# Patient Record
Sex: MALE | Race: WHITE | NOT HISPANIC OR LATINO | Employment: OTHER | URBAN - METROPOLITAN AREA
[De-identification: names, ages, dates, MRNs, and addresses within clinical notes are randomized per-mention and may not be internally consistent; named-entity substitution may affect disease eponyms.]

---

## 2018-01-01 ENCOUNTER — HOSPITAL ENCOUNTER (INPATIENT)
Facility: HOSPITAL | Age: 78
LOS: 8 days | Discharge: HOSPICE | End: 2018-10-16
Attending: EMERGENCY MEDICINE | Admitting: HOSPITALIST

## 2018-01-01 ENCOUNTER — APPOINTMENT (OUTPATIENT)
Dept: GENERAL RADIOLOGY | Facility: HOSPITAL | Age: 78
End: 2018-01-01
Attending: INTERNAL MEDICINE

## 2018-01-01 ENCOUNTER — APPOINTMENT (OUTPATIENT)
Dept: CT IMAGING | Facility: HOSPITAL | Age: 78
End: 2018-01-01

## 2018-01-01 ENCOUNTER — APPOINTMENT (OUTPATIENT)
Dept: GENERAL RADIOLOGY | Facility: HOSPITAL | Age: 78
End: 2018-01-01

## 2018-01-01 ENCOUNTER — HOSPITAL ENCOUNTER (INPATIENT)
Facility: HOSPITAL | Age: 78
LOS: 3 days | End: 2018-10-19
Attending: HOSPITALIST | Admitting: INTERNAL MEDICINE

## 2018-01-01 ENCOUNTER — APPOINTMENT (OUTPATIENT)
Dept: CARDIOLOGY | Facility: HOSPITAL | Age: 78
End: 2018-01-01
Attending: INTERNAL MEDICINE

## 2018-01-01 VITALS
DIASTOLIC BLOOD PRESSURE: 50 MMHG | SYSTOLIC BLOOD PRESSURE: 78 MMHG | TEMPERATURE: 100 F | OXYGEN SATURATION: 82 % | RESPIRATION RATE: 12 BRPM | HEART RATE: 102 BPM

## 2018-01-01 VITALS
OXYGEN SATURATION: 85 % | SYSTOLIC BLOOD PRESSURE: 121 MMHG | HEART RATE: 103 BPM | TEMPERATURE: 97.3 F | BODY MASS INDEX: 15.71 KG/M2 | DIASTOLIC BLOOD PRESSURE: 70 MMHG | WEIGHT: 112.21 LBS | HEIGHT: 71 IN | RESPIRATION RATE: 14 BRPM

## 2018-01-01 DIAGNOSIS — J18.9 COMMUNITY ACQUIRED PNEUMONIA OF LEFT LOWER LOBE OF LUNG: ICD-10-CM

## 2018-01-01 DIAGNOSIS — J96.01 ACUTE RESPIRATORY FAILURE WITH HYPOXIA (HCC): ICD-10-CM

## 2018-01-01 DIAGNOSIS — J44.1 COPD EXACERBATION (HCC): Primary | ICD-10-CM

## 2018-01-01 LAB
ALBUMIN SERPL-MCNC: 2.8 G/DL (ref 3.5–5.2)
ALBUMIN SERPL-MCNC: 2.9 G/DL (ref 3.5–5.2)
ALBUMIN SERPL-MCNC: 3.2 G/DL (ref 3.5–5.2)
ALBUMIN SERPL-MCNC: 3.5 G/DL (ref 3.5–5.2)
ALBUMIN SERPL-MCNC: 3.8 G/DL (ref 3.5–5.2)
ALBUMIN/GLOB SERPL: 0.9 G/DL
ALBUMIN/GLOB SERPL: 1.1 G/DL
ALBUMIN/GLOB SERPL: 1.1 G/DL
ALBUMIN/GLOB SERPL: 1.2 G/DL
ALBUMIN/GLOB SERPL: 1.2 G/DL
ALP SERPL-CCNC: 60 U/L (ref 39–117)
ALP SERPL-CCNC: 66 U/L (ref 39–117)
ALP SERPL-CCNC: 70 U/L (ref 39–117)
ALP SERPL-CCNC: 71 U/L (ref 39–117)
ALP SERPL-CCNC: 77 U/L (ref 39–117)
ALT SERPL W P-5'-P-CCNC: 123 U/L (ref 1–41)
ALT SERPL W P-5'-P-CCNC: 21 U/L (ref 1–41)
ALT SERPL W P-5'-P-CCNC: 62 U/L (ref 1–41)
ALT SERPL W P-5'-P-CCNC: 85 U/L (ref 1–41)
ALT SERPL W P-5'-P-CCNC: 96 U/L (ref 1–41)
ANION GAP SERPL CALCULATED.3IONS-SCNC: 10 MMOL/L
ANION GAP SERPL CALCULATED.3IONS-SCNC: 12.6 MMOL/L
ANION GAP SERPL CALCULATED.3IONS-SCNC: 12.9 MMOL/L
ANION GAP SERPL CALCULATED.3IONS-SCNC: 13.7 MMOL/L
ANION GAP SERPL CALCULATED.3IONS-SCNC: 13.7 MMOL/L
ANION GAP SERPL CALCULATED.3IONS-SCNC: 14.1 MMOL/L
ANION GAP SERPL CALCULATED.3IONS-SCNC: 9.4 MMOL/L
APPEARANCE FLD: ABNORMAL
APTT PPP: 28.2 SECONDS (ref 22.7–35.4)
ARTERIAL PATENCY WRIST A: ABNORMAL
ARTERIAL PATENCY WRIST A: POSITIVE
AST SERPL-CCNC: 123 U/L (ref 1–40)
AST SERPL-CCNC: 39 U/L (ref 1–40)
AST SERPL-CCNC: 42 U/L (ref 1–40)
AST SERPL-CCNC: 52 U/L (ref 1–40)
AST SERPL-CCNC: 80 U/L (ref 1–40)
ATMOSPHERIC PRESS: 748.3 MMHG
ATMOSPHERIC PRESS: 749.2 MMHG
ATMOSPHERIC PRESS: 751.6 MMHG
ATMOSPHERIC PRESS: 753.8 MMHG
ATMOSPHERIC PRESS: 754.6 MMHG
ATMOSPHERIC PRESS: 755 MMHG
B PERT DNA SPEC QL NAA+PROBE: NOT DETECTED
BACTERIA SPEC AEROBE CULT: NORMAL
BACTERIA SPEC RESP CULT: NORMAL
BASE EXCESS BLDA CALC-SCNC: -6.2 MMOL/L (ref 0–2)
BASE EXCESS BLDA CALC-SCNC: 0.5 MMOL/L (ref 0–2)
BASE EXCESS BLDA CALC-SCNC: 0.9 MMOL/L (ref 0–2)
BASE EXCESS BLDA CALC-SCNC: 1.5 MMOL/L (ref 0–2)
BASE EXCESS BLDA CALC-SCNC: 1.5 MMOL/L (ref 0–2)
BASE EXCESS BLDA CALC-SCNC: 5.9 MMOL/L (ref 0–2)
BASOPHILS # BLD AUTO: 0 10*3/MM3 (ref 0–0.2)
BASOPHILS # BLD AUTO: 0.01 10*3/MM3 (ref 0–0.2)
BASOPHILS NFR BLD AUTO: 0 % (ref 0–1.5)
BASOPHILS NFR BLD AUTO: 0.1 % (ref 0–1.5)
BDY SITE: ABNORMAL
BDY SITE: NORMAL
BH CV ECHO MEAS - ACS: 2.2 CM
BH CV ECHO MEAS - AO MAX PG (FULL): 1.9 MMHG
BH CV ECHO MEAS - AO MAX PG: 3.2 MMHG
BH CV ECHO MEAS - AO MEAN PG (FULL): 0 MMHG
BH CV ECHO MEAS - AO MEAN PG: 1 MMHG
BH CV ECHO MEAS - AO ROOT AREA (BSA CORRECTED): 2
BH CV ECHO MEAS - AO ROOT AREA: 9.1 CM^2
BH CV ECHO MEAS - AO ROOT DIAM: 3.4 CM
BH CV ECHO MEAS - AO V2 MAX: 88.9 CM/SEC
BH CV ECHO MEAS - AO V2 MEAN: 53.7 CM/SEC
BH CV ECHO MEAS - AO V2 VTI: 14.5 CM
BH CV ECHO MEAS - AVA(I,A): 2.4 CM^2
BH CV ECHO MEAS - AVA(I,D): 2.4 CM^2
BH CV ECHO MEAS - AVA(V,A): 2.2 CM^2
BH CV ECHO MEAS - AVA(V,D): 2.2 CM^2
BH CV ECHO MEAS - BSA(HAYCOCK): 1.6 M^2
BH CV ECHO MEAS - BSA: 1.7 M^2
BH CV ECHO MEAS - BZI_BMI: 16 KILOGRAMS/M^2
BH CV ECHO MEAS - BZI_METRIC_HEIGHT: 180.3 CM
BH CV ECHO MEAS - BZI_METRIC_WEIGHT: 52.2 KG
BH CV ECHO MEAS - EDV(CUBED): 110.6 ML
BH CV ECHO MEAS - EDV(MOD-SP2): 128 ML
BH CV ECHO MEAS - EDV(MOD-SP4): 188 ML
BH CV ECHO MEAS - EDV(TEICH): 107.5 ML
BH CV ECHO MEAS - EF(CUBED): 46.4 %
BH CV ECHO MEAS - EF(MOD-BP): 31.2 %
BH CV ECHO MEAS - EF(MOD-SP2): 31.3 %
BH CV ECHO MEAS - EF(MOD-SP4): 29.3 %
BH CV ECHO MEAS - EF(TEICH): 38.7 %
BH CV ECHO MEAS - ESV(CUBED): 59.3 ML
BH CV ECHO MEAS - ESV(MOD-SP2): 88 ML
BH CV ECHO MEAS - ESV(MOD-SP4): 133 ML
BH CV ECHO MEAS - ESV(TEICH): 65.9 ML
BH CV ECHO MEAS - FS: 18.8 %
BH CV ECHO MEAS - IVS/LVPW: 1
BH CV ECHO MEAS - IVSD: 1.1 CM
BH CV ECHO MEAS - LAT PEAK E' VEL: 6.2 CM/SEC
BH CV ECHO MEAS - LV DIASTOLIC VOL/BSA (35-75): 112.8 ML/M^2
BH CV ECHO MEAS - LV MASS(C)D: 194 GRAMS
BH CV ECHO MEAS - LV MASS(C)DI: 116.4 GRAMS/M^2
BH CV ECHO MEAS - LV MAX PG: 1.3 MMHG
BH CV ECHO MEAS - LV MEAN PG: 1 MMHG
BH CV ECHO MEAS - LV SYSTOLIC VOL/BSA (12-30): 79.8 ML/M^2
BH CV ECHO MEAS - LV V1 MAX: 56.7 CM/SEC
BH CV ECHO MEAS - LV V1 MEAN: 39.4 CM/SEC
BH CV ECHO MEAS - LV V1 VTI: 10.1 CM
BH CV ECHO MEAS - LVIDD: 4.8 CM
BH CV ECHO MEAS - LVIDS: 3.9 CM
BH CV ECHO MEAS - LVLD AP2: 7 CM
BH CV ECHO MEAS - LVLD AP4: 9.4 CM
BH CV ECHO MEAS - LVLS AP2: 6.5 CM
BH CV ECHO MEAS - LVLS AP4: 8.2 CM
BH CV ECHO MEAS - LVOT AREA (M): 3.5 CM^2
BH CV ECHO MEAS - LVOT AREA: 3.5 CM^2
BH CV ECHO MEAS - LVOT DIAM: 2.1 CM
BH CV ECHO MEAS - LVPWD: 1.1 CM
BH CV ECHO MEAS - MED PEAK E' VEL: 5.08 CM/SEC
BH CV ECHO MEAS - MV A DUR: 0.18 SEC
BH CV ECHO MEAS - MV A MAX VEL: 56.6 CM/SEC
BH CV ECHO MEAS - MV DEC SLOPE: 229 CM/SEC^2
BH CV ECHO MEAS - MV DEC TIME: 0.25 SEC
BH CV ECHO MEAS - MV E MAX VEL: 33.4 CM/SEC
BH CV ECHO MEAS - MV E/A: 0.59
BH CV ECHO MEAS - MV MAX PG: 2.4 MMHG
BH CV ECHO MEAS - MV MEAN PG: 1 MMHG
BH CV ECHO MEAS - MV P1/2T MAX VEL: 55 CM/SEC
BH CV ECHO MEAS - MV P1/2T: 70.3 MSEC
BH CV ECHO MEAS - MV V2 MAX: 77.5 CM/SEC
BH CV ECHO MEAS - MV V2 MEAN: 32.5 CM/SEC
BH CV ECHO MEAS - MV V2 VTI: 18.4 CM
BH CV ECHO MEAS - MVA P1/2T LCG: 4 CM^2
BH CV ECHO MEAS - MVA(P1/2T): 3.1 CM^2
BH CV ECHO MEAS - MVA(VTI): 1.9 CM^2
BH CV ECHO MEAS - PA ACC TIME: 0.11 SEC
BH CV ECHO MEAS - PA MAX PG (FULL): 0.64 MMHG
BH CV ECHO MEAS - PA MAX PG: 1.7 MMHG
BH CV ECHO MEAS - PA PR(ACCEL): 28.2 MMHG
BH CV ECHO MEAS - PA V2 MAX: 66 CM/SEC
BH CV ECHO MEAS - PULM A REVS DUR: 0.16 SEC
BH CV ECHO MEAS - PULM A REVS VEL: 49.1 CM/SEC
BH CV ECHO MEAS - PULM DIAS VEL: 49.5 CM/SEC
BH CV ECHO MEAS - PULM S/D: 1.1
BH CV ECHO MEAS - PULM SYS VEL: 52.6 CM/SEC
BH CV ECHO MEAS - PVA(V,A): 4.2 CM^2
BH CV ECHO MEAS - PVA(V,D): 4.2 CM^2
BH CV ECHO MEAS - QP/QS: 1.4
BH CV ECHO MEAS - RAP SYSTOLE: 3 MMHG
BH CV ECHO MEAS - RV MAX PG: 1.1 MMHG
BH CV ECHO MEAS - RV MEAN PG: 0 MMHG
BH CV ECHO MEAS - RV V1 MAX: 52.5 CM/SEC
BH CV ECHO MEAS - RV V1 MEAN: 30.5 CM/SEC
BH CV ECHO MEAS - RV V1 VTI: 9.5 CM
BH CV ECHO MEAS - RVOT AREA: 5.3 CM^2
BH CV ECHO MEAS - RVOT DIAM: 2.6 CM
BH CV ECHO MEAS - RVSP: 27 MMHG
BH CV ECHO MEAS - SI(AO): 79 ML/M^2
BH CV ECHO MEAS - SI(CUBED): 30.8 ML/M^2
BH CV ECHO MEAS - SI(LVOT): 21 ML/M^2
BH CV ECHO MEAS - SI(MOD-SP2): 24 ML/M^2
BH CV ECHO MEAS - SI(MOD-SP4): 33 ML/M^2
BH CV ECHO MEAS - SI(TEICH): 25 ML/M^2
BH CV ECHO MEAS - SV(AO): 131.6 ML
BH CV ECHO MEAS - SV(CUBED): 51.3 ML
BH CV ECHO MEAS - SV(LVOT): 35 ML
BH CV ECHO MEAS - SV(MOD-SP2): 40 ML
BH CV ECHO MEAS - SV(MOD-SP4): 55 ML
BH CV ECHO MEAS - SV(RVOT): 50.5 ML
BH CV ECHO MEAS - SV(TEICH): 41.6 ML
BH CV ECHO MEAS - TAPSE (>1.6): 2.3 CM2
BH CV ECHO MEAS - TR MAX PG: 24
BH CV ECHO MEAS - TR MAX VEL: 245 CM/SEC
BH CV ECHO MEASUREMENTS AVERAGE E/E' RATIO: 5.92
BH CV VAS BP RIGHT ARM: NORMAL MMHG
BH CV XLRA - RV BASE: 3.09 CM
BH CV XLRA - TDI S': 10 CM/SEC
BILIRUB SERPL-MCNC: 0.2 MG/DL (ref 0.1–1.2)
BILIRUB SERPL-MCNC: <0.2 MG/DL (ref 0.1–1.2)
BUN BLD-MCNC: 15 MG/DL (ref 8–23)
BUN BLD-MCNC: 16 MG/DL (ref 8–23)
BUN BLD-MCNC: 24 MG/DL (ref 8–23)
BUN BLD-MCNC: 30 MG/DL (ref 8–23)
BUN BLD-MCNC: 33 MG/DL (ref 8–23)
BUN BLD-MCNC: 34 MG/DL (ref 8–23)
BUN BLD-MCNC: 42 MG/DL (ref 8–23)
BUN/CREAT SERPL: 19.5 (ref 7–25)
BUN/CREAT SERPL: 22.9 (ref 7–25)
BUN/CREAT SERPL: 33.3 (ref 7–25)
BUN/CREAT SERPL: 36.6 (ref 7–25)
BUN/CREAT SERPL: 45.2 (ref 7–25)
BUN/CREAT SERPL: 50 (ref 7–25)
BUN/CREAT SERPL: 55.7 (ref 7–25)
C PNEUM DNA NPH QL NAA+NON-PROBE: NOT DETECTED
CALCIUM SPEC-SCNC: 7.5 MG/DL (ref 8.6–10.5)
CALCIUM SPEC-SCNC: 8.3 MG/DL (ref 8.6–10.5)
CALCIUM SPEC-SCNC: 8.4 MG/DL (ref 8.6–10.5)
CALCIUM SPEC-SCNC: 8.5 MG/DL (ref 8.6–10.5)
CALCIUM SPEC-SCNC: 8.6 MG/DL (ref 8.6–10.5)
CALCIUM SPEC-SCNC: 8.6 MG/DL (ref 8.6–10.5)
CALCIUM SPEC-SCNC: 9.2 MG/DL (ref 8.6–10.5)
CHLORIDE SERPL-SCNC: 101 MMOL/L (ref 98–107)
CHLORIDE SERPL-SCNC: 105 MMOL/L (ref 98–107)
CHLORIDE SERPL-SCNC: 106 MMOL/L (ref 98–107)
CHLORIDE SERPL-SCNC: 95 MMOL/L (ref 98–107)
CHLORIDE SERPL-SCNC: 96 MMOL/L (ref 98–107)
CO2 SERPL-SCNC: 24 MMOL/L (ref 22–29)
CO2 SERPL-SCNC: 24.3 MMOL/L (ref 22–29)
CO2 SERPL-SCNC: 24.6 MMOL/L (ref 22–29)
CO2 SERPL-SCNC: 26.3 MMOL/L (ref 22–29)
CO2 SERPL-SCNC: 26.9 MMOL/L (ref 22–29)
CO2 SERPL-SCNC: 29.1 MMOL/L (ref 22–29)
CO2 SERPL-SCNC: 29.4 MMOL/L (ref 22–29)
COLOR FLD: ABNORMAL
CREAT BLD-MCNC: 0.61 MG/DL (ref 0.76–1.27)
CREAT BLD-MCNC: 0.7 MG/DL (ref 0.76–1.27)
CREAT BLD-MCNC: 0.72 MG/DL (ref 0.76–1.27)
CREAT BLD-MCNC: 0.73 MG/DL (ref 0.76–1.27)
CREAT BLD-MCNC: 0.77 MG/DL (ref 0.76–1.27)
CREAT BLD-MCNC: 0.82 MG/DL (ref 0.76–1.27)
CREAT BLD-MCNC: 0.84 MG/DL (ref 0.76–1.27)
CYTO UR: NORMAL
D DIMER PPP FEU-MCNC: 1.2 MCGFEU/ML (ref 0–0.49)
D-LACTATE SERPL-SCNC: 1.7 MMOL/L (ref 0.5–2)
D-LACTATE SERPL-SCNC: 1.8 MMOL/L (ref 0.5–2)
D-LACTATE SERPL-SCNC: 4.2 MMOL/L (ref 0.5–2)
DEPRECATED RDW RBC AUTO: 47.8 FL (ref 37–54)
DEPRECATED RDW RBC AUTO: 48.4 FL (ref 37–54)
DEPRECATED RDW RBC AUTO: 49 FL (ref 37–54)
DEPRECATED RDW RBC AUTO: 49 FL (ref 37–54)
DEPRECATED RDW RBC AUTO: 49.9 FL (ref 37–54)
DEPRECATED RDW RBC AUTO: 50.3 FL (ref 37–54)
EOSINOPHIL # BLD AUTO: 0 10*3/MM3 (ref 0–0.7)
EOSINOPHIL NFR BLD AUTO: 0 % (ref 0.3–6.2)
ERYTHROCYTE [DISTWIDTH] IN BLOOD BY AUTOMATED COUNT: 14.7 % (ref 11.5–14.5)
ERYTHROCYTE [DISTWIDTH] IN BLOOD BY AUTOMATED COUNT: 14.9 % (ref 11.5–14.5)
ERYTHROCYTE [DISTWIDTH] IN BLOOD BY AUTOMATED COUNT: 15 % (ref 11.5–14.5)
ERYTHROCYTE [DISTWIDTH] IN BLOOD BY AUTOMATED COUNT: 15 % (ref 11.5–14.5)
ERYTHROCYTE [DISTWIDTH] IN BLOOD BY AUTOMATED COUNT: 15.1 % (ref 11.5–14.5)
ERYTHROCYTE [DISTWIDTH] IN BLOOD BY AUTOMATED COUNT: 15.5 % (ref 11.5–14.5)
FLUAV H1 2009 PAND RNA NPH QL NAA+PROBE: NOT DETECTED
FLUAV H1 HA GENE NPH QL NAA+PROBE: NOT DETECTED
FLUAV H3 RNA NPH QL NAA+PROBE: NOT DETECTED
FLUAV SUBTYP SPEC NAA+PROBE: NOT DETECTED
FLUBV RNA ISLT QL NAA+PROBE: NOT DETECTED
GAS FLOW AIRWAY: 2.5 LPM
GAS FLOW AIRWAY: 3 LPM
GFR SERPL CREATININE-BSD FRML MDRD: 104 ML/MIN/1.73
GFR SERPL CREATININE-BSD FRML MDRD: 106 ML/MIN/1.73
GFR SERPL CREATININE-BSD FRML MDRD: 109 ML/MIN/1.73
GFR SERPL CREATININE-BSD FRML MDRD: 128 ML/MIN/1.73
GFR SERPL CREATININE-BSD FRML MDRD: 89 ML/MIN/1.73
GFR SERPL CREATININE-BSD FRML MDRD: 91 ML/MIN/1.73
GFR SERPL CREATININE-BSD FRML MDRD: 98 ML/MIN/1.73
GLOBULIN UR ELPH-MCNC: 2.6 GM/DL
GLOBULIN UR ELPH-MCNC: 2.6 GM/DL
GLOBULIN UR ELPH-MCNC: 3.1 GM/DL
GLOBULIN UR ELPH-MCNC: 3.2 GM/DL
GLOBULIN UR ELPH-MCNC: 3.3 GM/DL
GLUCOSE BLD-MCNC: 105 MG/DL (ref 65–99)
GLUCOSE BLD-MCNC: 127 MG/DL (ref 65–99)
GLUCOSE BLD-MCNC: 166 MG/DL (ref 65–99)
GLUCOSE BLD-MCNC: 172 MG/DL (ref 65–99)
GLUCOSE BLD-MCNC: 208 MG/DL (ref 65–99)
GLUCOSE BLD-MCNC: 218 MG/DL (ref 65–99)
GLUCOSE BLD-MCNC: 229 MG/DL (ref 65–99)
GLUCOSE BLDC GLUCOMTR-MCNC: 104 MG/DL (ref 70–130)
GLUCOSE BLDC GLUCOMTR-MCNC: 117 MG/DL (ref 70–130)
GLUCOSE BLDC GLUCOMTR-MCNC: 120 MG/DL (ref 70–130)
GLUCOSE BLDC GLUCOMTR-MCNC: 143 MG/DL (ref 70–130)
GLUCOSE BLDC GLUCOMTR-MCNC: 145 MG/DL (ref 70–130)
GLUCOSE BLDC GLUCOMTR-MCNC: 147 MG/DL (ref 70–130)
GLUCOSE BLDC GLUCOMTR-MCNC: 150 MG/DL (ref 70–130)
GLUCOSE BLDC GLUCOMTR-MCNC: 162 MG/DL (ref 70–130)
GLUCOSE BLDC GLUCOMTR-MCNC: 173 MG/DL (ref 70–130)
GLUCOSE BLDC GLUCOMTR-MCNC: 186 MG/DL (ref 70–130)
GLUCOSE BLDC GLUCOMTR-MCNC: 198 MG/DL (ref 70–130)
GLUCOSE BLDC GLUCOMTR-MCNC: 222 MG/DL (ref 70–130)
GLUCOSE BLDC GLUCOMTR-MCNC: 246 MG/DL (ref 70–130)
GRAM STN SPEC: NORMAL
HADV DNA SPEC NAA+PROBE: NOT DETECTED
HCO3 BLDA-SCNC: 22.3 MMOL/L (ref 22–28)
HCO3 BLDA-SCNC: 24.6 MMOL/L (ref 22–28)
HCO3 BLDA-SCNC: 25.8 MMOL/L (ref 22–28)
HCO3 BLDA-SCNC: 25.9 MMOL/L (ref 22–28)
HCO3 BLDA-SCNC: 26.3 MMOL/L (ref 22–28)
HCO3 BLDA-SCNC: 31.5 MMOL/L (ref 22–28)
HCOV 229E RNA SPEC QL NAA+PROBE: NOT DETECTED
HCOV HKU1 RNA SPEC QL NAA+PROBE: NOT DETECTED
HCOV NL63 RNA SPEC QL NAA+PROBE: NOT DETECTED
HCOV OC43 RNA SPEC QL NAA+PROBE: NOT DETECTED
HCT VFR BLD AUTO: 39.1 % (ref 40.4–52.2)
HCT VFR BLD AUTO: 41.1 % (ref 40.4–52.2)
HCT VFR BLD AUTO: 41.5 % (ref 40.4–52.2)
HCT VFR BLD AUTO: 44.1 % (ref 40.4–52.2)
HCT VFR BLD AUTO: 44.4 % (ref 40.4–52.2)
HCT VFR BLD AUTO: 44.7 % (ref 40.4–52.2)
HGB BLD-MCNC: 12.6 G/DL (ref 13.7–17.6)
HGB BLD-MCNC: 12.9 G/DL (ref 13.7–17.6)
HGB BLD-MCNC: 13.5 G/DL (ref 13.7–17.6)
HGB BLD-MCNC: 13.7 G/DL (ref 13.7–17.6)
HGB BLD-MCNC: 14 G/DL (ref 13.7–17.6)
HGB BLD-MCNC: 14.2 G/DL (ref 13.7–17.6)
HMPV RNA NPH QL NAA+NON-PROBE: NOT DETECTED
HOLD SPECIMEN: NORMAL
HOROWITZ INDEX BLD+IHG-RTO: 100 %
HOROWITZ INDEX BLD+IHG-RTO: 30 %
HOROWITZ INDEX BLD+IHG-RTO: 60 %
HPIV1 RNA SPEC QL NAA+PROBE: NOT DETECTED
HPIV2 RNA SPEC QL NAA+PROBE: NOT DETECTED
HPIV3 RNA NPH QL NAA+PROBE: NOT DETECTED
HPIV4 P GENE NPH QL NAA+PROBE: NOT DETECTED
IMM GRANULOCYTES # BLD: 0 10*3/MM3 (ref 0–0.03)
IMM GRANULOCYTES # BLD: 0.02 10*3/MM3 (ref 0–0.03)
IMM GRANULOCYTES # BLD: 0.04 10*3/MM3 (ref 0–0.03)
IMM GRANULOCYTES # BLD: 0.05 10*3/MM3 (ref 0–0.03)
IMM GRANULOCYTES NFR BLD: 0 % (ref 0–0.5)
IMM GRANULOCYTES NFR BLD: 0.2 % (ref 0–0.5)
IMM GRANULOCYTES NFR BLD: 0.3 % (ref 0–0.5)
IMM GRANULOCYTES NFR BLD: 0.3 % (ref 0–0.5)
INR PPP: 1.12 (ref 0.9–1.1)
L PNEUMO1 AG UR QL IA: NEGATIVE
LAB AP CASE REPORT: NORMAL
LEFT ATRIUM VOLUME INDEX: 30.7 ML/M2
LV EF 2D ECHO EST: 10 %
LYMPHOCYTES # BLD AUTO: 0.3 10*3/MM3 (ref 0.9–4.8)
LYMPHOCYTES # BLD AUTO: 0.54 10*3/MM3 (ref 0.9–4.8)
LYMPHOCYTES # BLD AUTO: 0.96 10*3/MM3 (ref 0.9–4.8)
LYMPHOCYTES # BLD AUTO: 1.32 10*3/MM3 (ref 0.9–4.8)
LYMPHOCYTES NFR BLD AUTO: 3.5 % (ref 19.6–45.3)
LYMPHOCYTES NFR BLD AUTO: 4.3 % (ref 19.6–45.3)
LYMPHOCYTES NFR BLD AUTO: 6.8 % (ref 19.6–45.3)
LYMPHOCYTES NFR BLD AUTO: 8.9 % (ref 19.6–45.3)
LYMPHOCYTES NFR FLD MANUAL: 22 %
M PNEUMO IGG SER IA-ACNC: NOT DETECTED
MAGNESIUM SERPL-MCNC: 1.9 MG/DL (ref 1.6–2.4)
MAGNESIUM SERPL-MCNC: 2 MG/DL (ref 1.6–2.4)
MAGNESIUM SERPL-MCNC: 2.3 MG/DL (ref 1.6–2.4)
MAGNESIUM SERPL-MCNC: 2.3 MG/DL (ref 1.6–2.4)
MAGNESIUM SERPL-MCNC: 2.4 MG/DL (ref 1.6–2.4)
MAXIMAL PREDICTED HEART RATE: 143 BPM
MCH RBC QN AUTO: 27.8 PG (ref 27–32.7)
MCH RBC QN AUTO: 28.3 PG (ref 27–32.7)
MCH RBC QN AUTO: 28.4 PG (ref 27–32.7)
MCH RBC QN AUTO: 28.5 PG (ref 27–32.7)
MCH RBC QN AUTO: 28.9 PG (ref 27–32.7)
MCH RBC QN AUTO: 29.1 PG (ref 27–32.7)
MCHC RBC AUTO-ENTMCNC: 31.1 G/DL (ref 32.6–36.4)
MCHC RBC AUTO-ENTMCNC: 31.4 G/DL (ref 32.6–36.4)
MCHC RBC AUTO-ENTMCNC: 31.5 G/DL (ref 32.6–36.4)
MCHC RBC AUTO-ENTMCNC: 31.8 G/DL (ref 32.6–36.4)
MCHC RBC AUTO-ENTMCNC: 32.2 G/DL (ref 32.6–36.4)
MCHC RBC AUTO-ENTMCNC: 32.5 G/DL (ref 32.6–36.4)
MCV RBC AUTO: 89.4 FL (ref 79.8–96.2)
MCV RBC AUTO: 89.6 FL (ref 79.8–96.2)
MCV RBC AUTO: 89.6 FL (ref 79.8–96.2)
MCV RBC AUTO: 89.7 FL (ref 79.8–96.2)
MCV RBC AUTO: 90.1 FL (ref 79.8–96.2)
MCV RBC AUTO: 90.1 FL (ref 79.8–96.2)
MODALITY: ABNORMAL
MODALITY: NORMAL
MONOCYTES # BLD AUTO: 0.25 10*3/MM3 (ref 0.2–1.2)
MONOCYTES # BLD AUTO: 0.4 10*3/MM3 (ref 0.2–1.2)
MONOCYTES # BLD AUTO: 0.69 10*3/MM3 (ref 0.2–1.2)
MONOCYTES # BLD AUTO: 1.14 10*3/MM3 (ref 0.2–1.2)
MONOCYTES NFR BLD AUTO: 1.8 % (ref 5–12)
MONOCYTES NFR BLD AUTO: 4.7 % (ref 5–12)
MONOCYTES NFR BLD AUTO: 4.7 % (ref 5–12)
MONOCYTES NFR BLD AUTO: 9 % (ref 5–12)
MONOS+MACROS NFR FLD: 9 %
NEUTROPHILS # BLD AUTO: 10.94 10*3/MM3 (ref 1.9–8.1)
NEUTROPHILS # BLD AUTO: 12.74 10*3/MM3 (ref 1.9–8.1)
NEUTROPHILS # BLD AUTO: 12.89 10*3/MM3 (ref 1.9–8.1)
NEUTROPHILS # BLD AUTO: 7.87 10*3/MM3 (ref 1.9–8.1)
NEUTROPHILS NFR BLD AUTO: 86.3 % (ref 42.7–76)
NEUTROPHILS NFR BLD AUTO: 86.4 % (ref 42.7–76)
NEUTROPHILS NFR BLD AUTO: 91.3 % (ref 42.7–76)
NEUTROPHILS NFR BLD AUTO: 91.8 % (ref 42.7–76)
NEUTROPHILS NFR FLD MANUAL: 69 %
NT-PROBNP SERPL-MCNC: 536.5 PG/ML (ref 0–1800)
NT-PROBNP SERPL-MCNC: ABNORMAL PG/ML (ref 0–1800)
O2 A-A PPRESDIFF RESPIRATORY: 0.2 MMHG
O2 A-A PPRESDIFF RESPIRATORY: 0.4 MMHG
O2 A-A PPRESDIFF RESPIRATORY: 0.6 MMHG
PATH REPORT.FINAL DX SPEC: NORMAL
PATH REPORT.GROSS SPEC: NORMAL
PCO2 BLDA: 37.2 MM HG (ref 35–45)
PCO2 BLDA: 39.6 MM HG (ref 35–45)
PCO2 BLDA: 41.1 MM HG (ref 35–45)
PCO2 BLDA: 41.3 MM HG (ref 35–45)
PCO2 BLDA: 48.3 MM HG (ref 35–45)
PCO2 BLDA: 55.7 MM HG (ref 35–45)
PEEP RESPIRATORY: 5 CM[H2O]
PH BLDA: 7.21 PH UNITS (ref 7.35–7.45)
PH BLDA: 7.4 PH UNITS (ref 7.35–7.45)
PH BLDA: 7.41 PH UNITS (ref 7.35–7.45)
PH BLDA: 7.42 PH UNITS (ref 7.35–7.45)
PH BLDA: 7.42 PH UNITS (ref 7.35–7.45)
PH BLDA: 7.43 PH UNITS (ref 7.35–7.45)
PHOSPHATE SERPL-MCNC: 3 MG/DL (ref 2.5–4.5)
PLATELET # BLD AUTO: 176 10*3/MM3 (ref 140–500)
PLATELET # BLD AUTO: 196 10*3/MM3 (ref 140–500)
PLATELET # BLD AUTO: 252 10*3/MM3 (ref 140–500)
PLATELET # BLD AUTO: 263 10*3/MM3 (ref 140–500)
PLATELET # BLD AUTO: 267 10*3/MM3 (ref 140–500)
PLATELET # BLD AUTO: 270 10*3/MM3 (ref 140–500)
PMV BLD AUTO: 10.5 FL (ref 6–12)
PMV BLD AUTO: 10.6 FL (ref 6–12)
PMV BLD AUTO: 10.7 FL (ref 6–12)
PMV BLD AUTO: 10.8 FL (ref 6–12)
PMV BLD AUTO: 10.9 FL (ref 6–12)
PMV BLD AUTO: 11 FL (ref 6–12)
PO2 BLDA: 421.8 MM HG (ref 80–100)
PO2 BLDA: 75.6 MM HG (ref 80–100)
PO2 BLDA: 77.8 MM HG (ref 80–100)
PO2 BLDA: 78.2 MM HG (ref 80–100)
PO2 BLDA: 80.3 MM HG (ref 80–100)
PO2 BLDA: 83.7 MM HG (ref 80–100)
POTASSIUM BLD-SCNC: 3.5 MMOL/L (ref 3.5–5.2)
POTASSIUM BLD-SCNC: 3.8 MMOL/L (ref 3.5–5.2)
POTASSIUM BLD-SCNC: 4 MMOL/L (ref 3.5–5.2)
POTASSIUM BLD-SCNC: 4 MMOL/L (ref 3.5–5.2)
POTASSIUM BLD-SCNC: 4.2 MMOL/L (ref 3.5–5.2)
POTASSIUM BLD-SCNC: 4.4 MMOL/L (ref 3.5–5.2)
POTASSIUM BLD-SCNC: 4.7 MMOL/L (ref 3.5–5.2)
PROCALCITONIN SERPL-MCNC: 0.84 NG/ML (ref 0.1–0.25)
PROCALCITONIN SERPL-MCNC: 1.12 NG/ML (ref 0.1–0.25)
PROT SERPL-MCNC: 5.5 G/DL (ref 6–8.5)
PROT SERPL-MCNC: 5.8 G/DL (ref 6–8.5)
PROT SERPL-MCNC: 6 G/DL (ref 6–8.5)
PROT SERPL-MCNC: 6.6 G/DL (ref 6–8.5)
PROT SERPL-MCNC: 7.1 G/DL (ref 6–8.5)
PROTHROMBIN TIME: 14.2 SECONDS (ref 11.7–14.2)
PSV: 8 CMH2O
RBC # BLD AUTO: 4.36 10*6/MM3 (ref 4.6–6)
RBC # BLD AUTO: 4.56 10*6/MM3 (ref 4.6–6)
RBC # BLD AUTO: 4.64 10*6/MM3 (ref 4.6–6)
RBC # BLD AUTO: 4.92 10*6/MM3 (ref 4.6–6)
RBC # BLD AUTO: 4.93 10*6/MM3 (ref 4.6–6)
RBC # BLD AUTO: 4.99 10*6/MM3 (ref 4.6–6)
RBC # FLD AUTO: 26 /MM3
RHINOVIRUS RNA SPEC NAA+PROBE: NOT DETECTED
RSV RNA NPH QL NAA+NON-PROBE: NOT DETECTED
S PNEUM AG SPEC QL LA: NEGATIVE
SAO2 % BLDCOA: 95 % (ref 92–99)
SAO2 % BLDCOA: 95.6 % (ref 92–99)
SAO2 % BLDCOA: 95.8 % (ref 92–99)
SAO2 % BLDCOA: 96 % (ref 92–99)
SAO2 % BLDCOA: 96.3 % (ref 92–99)
SAO2 % BLDCOA: 99.9 % (ref 92–99)
SET MECH RESP RATE: 14
SET MECH RESP RATE: 16
SET MECH RESP RATE: 28
SET MECH RESP RATE: 32
SODIUM BLD-SCNC: 133 MMOL/L (ref 136–145)
SODIUM BLD-SCNC: 136 MMOL/L (ref 136–145)
SODIUM BLD-SCNC: 139 MMOL/L (ref 136–145)
SODIUM BLD-SCNC: 140 MMOL/L (ref 136–145)
SODIUM BLD-SCNC: 142 MMOL/L (ref 136–145)
SODIUM BLD-SCNC: 143 MMOL/L (ref 136–145)
SODIUM BLD-SCNC: 143 MMOL/L (ref 136–145)
STRESS TARGET HR: 122 BPM
TOTAL RATE: 18 BREATHS/MINUTE
TOTAL RATE: 23 BREATHS/MINUTE
TOTAL RATE: 26 BREATHS/MINUTE
TOTAL RATE: 27 BREATHS/MINUTE
TOTAL RATE: 28 BREATHS/MINUTE
TROPONIN T SERPL-MCNC: 0.07 NG/ML (ref 0–0.03)
TROPONIN T SERPL-MCNC: 0.07 NG/ML (ref 0–0.03)
TROPONIN T SERPL-MCNC: 0.19 NG/ML (ref 0–0.03)
TROPONIN T SERPL-MCNC: <0.01 NG/ML (ref 0–0.03)
VENTILATOR MODE: ABNORMAL
VENTILATOR MODE: ABNORMAL
VENTILATOR MODE: AC
WBC # FLD: 1280 /MM3
WBC NRBC COR # BLD: 11.12 10*3/MM3 (ref 4.5–10.7)
WBC NRBC COR # BLD: 12.65 10*3/MM3 (ref 4.5–10.7)
WBC NRBC COR # BLD: 14.11 10*3/MM3 (ref 4.5–10.7)
WBC NRBC COR # BLD: 14.76 10*3/MM3 (ref 4.5–10.7)
WBC NRBC COR # BLD: 15.65 10*3/MM3 (ref 4.5–10.7)
WBC NRBC COR # BLD: 8.57 10*3/MM3 (ref 4.5–10.7)
WHOLE BLOOD HOLD SPECIMEN: NORMAL
WHOLE BLOOD HOLD SPECIMEN: NORMAL

## 2018-01-01 PROCEDURE — 82962 GLUCOSE BLOOD TEST: CPT

## 2018-01-01 PROCEDURE — 94799 UNLISTED PULMONARY SVC/PX: CPT

## 2018-01-01 PROCEDURE — 87040 BLOOD CULTURE FOR BACTERIA: CPT | Performed by: INTERNAL MEDICINE

## 2018-01-01 PROCEDURE — 71045 X-RAY EXAM CHEST 1 VIEW: CPT

## 2018-01-01 PROCEDURE — 25010000002 MORPHINE PER 10 MG: Performed by: INTERNAL MEDICINE

## 2018-01-01 PROCEDURE — 25010000002 THIAMINE PER 100 MG: Performed by: INTERNAL MEDICINE

## 2018-01-01 PROCEDURE — 87899 AGENT NOS ASSAY W/OPTIC: CPT | Performed by: INTERNAL MEDICINE

## 2018-01-01 PROCEDURE — 94002 VENT MGMT INPAT INIT DAY: CPT

## 2018-01-01 PROCEDURE — 25010000002 PHENYLEPHRINE 10 MG/ML SOLUTION 5 ML VIAL: Performed by: INTERNAL MEDICINE

## 2018-01-01 PROCEDURE — 94003 VENT MGMT INPAT SUBQ DAY: CPT

## 2018-01-01 PROCEDURE — 82803 BLOOD GASES ANY COMBINATION: CPT

## 2018-01-01 PROCEDURE — 93010 ELECTROCARDIOGRAM REPORT: CPT | Performed by: INTERNAL MEDICINE

## 2018-01-01 PROCEDURE — 87486 CHLMYD PNEUM DNA AMP PROBE: CPT | Performed by: INTERNAL MEDICINE

## 2018-01-01 PROCEDURE — 85025 COMPLETE CBC W/AUTO DIFF WBC: CPT | Performed by: INTERNAL MEDICINE

## 2018-01-01 PROCEDURE — 99232 SBSQ HOSP IP/OBS MODERATE 35: CPT | Performed by: INTERNAL MEDICINE

## 2018-01-01 PROCEDURE — 25010000002 FENTANYL CITRATE (PF) 100 MCG/2ML SOLUTION

## 2018-01-01 PROCEDURE — 92950 HEART/LUNG RESUSCITATION CPR: CPT

## 2018-01-01 PROCEDURE — 94640 AIRWAY INHALATION TREATMENT: CPT

## 2018-01-01 PROCEDURE — 25010000002 HYDROMORPHONE PER 4 MG: Performed by: HOSPITALIST

## 2018-01-01 PROCEDURE — 0BH17EZ INSERTION OF ENDOTRACHEAL AIRWAY INTO TRACHEA, VIA NATURAL OR ARTIFICIAL OPENING: ICD-10-PCS | Performed by: INTERNAL MEDICINE

## 2018-01-01 PROCEDURE — 83880 ASSAY OF NATRIURETIC PEPTIDE: CPT | Performed by: INTERNAL MEDICINE

## 2018-01-01 PROCEDURE — 93005 ELECTROCARDIOGRAM TRACING: CPT | Performed by: INTERNAL MEDICINE

## 2018-01-01 PROCEDURE — 25010000002 ENOXAPARIN PER 10 MG: Performed by: HOSPITALIST

## 2018-01-01 PROCEDURE — 36415 COLL VENOUS BLD VENIPUNCTURE: CPT | Performed by: INTERNAL MEDICINE

## 2018-01-01 PROCEDURE — 25010000002 FUROSEMIDE PER 20 MG: Performed by: INTERNAL MEDICINE

## 2018-01-01 PROCEDURE — 84484 ASSAY OF TROPONIN QUANT: CPT | Performed by: PHYSICIAN ASSISTANT

## 2018-01-01 PROCEDURE — 25010000002 CEFTRIAXONE PER 250 MG: Performed by: INTERNAL MEDICINE

## 2018-01-01 PROCEDURE — 80048 BASIC METABOLIC PNL TOTAL CA: CPT | Performed by: INTERNAL MEDICINE

## 2018-01-01 PROCEDURE — 25010000002 LORAZEPAM PER 2 MG: Performed by: INTERNAL MEDICINE

## 2018-01-01 PROCEDURE — 25010000002 FENTANYL CITRATE (PF) 100 MCG/2ML SOLUTION: Performed by: INTERNAL MEDICINE

## 2018-01-01 PROCEDURE — 25010000002 LORAZEPAM PER 2 MG: Performed by: HOSPITALIST

## 2018-01-01 PROCEDURE — 93005 ELECTROCARDIOGRAM TRACING: CPT | Performed by: HOSPITALIST

## 2018-01-01 PROCEDURE — 87205 SMEAR GRAM STAIN: CPT | Performed by: INTERNAL MEDICINE

## 2018-01-01 PROCEDURE — 83605 ASSAY OF LACTIC ACID: CPT | Performed by: INTERNAL MEDICINE

## 2018-01-01 PROCEDURE — 25010000002 PROPOFOL 1000 MG/ML EMULSION: Performed by: INTERNAL MEDICINE

## 2018-01-01 PROCEDURE — 25010000002 AMIODARONE IN DEXTROSE 5% 150-4.21 MG/100ML-% SOLUTION: Performed by: INTERNAL MEDICINE

## 2018-01-01 PROCEDURE — 36600 WITHDRAWAL OF ARTERIAL BLOOD: CPT

## 2018-01-01 PROCEDURE — 87581 M.PNEUMON DNA AMP PROBE: CPT | Performed by: INTERNAL MEDICINE

## 2018-01-01 PROCEDURE — 25010000002 METHYLPREDNISOLONE PER 125 MG: Performed by: INTERNAL MEDICINE

## 2018-01-01 PROCEDURE — 25010000002 DIGOXIN PER 500 MCG: Performed by: INTERNAL MEDICINE

## 2018-01-01 PROCEDURE — 63710000001 INSULIN ASPART PER 5 UNITS: Performed by: INTERNAL MEDICINE

## 2018-01-01 PROCEDURE — 89051 BODY FLUID CELL COUNT: CPT | Performed by: INTERNAL MEDICINE

## 2018-01-01 PROCEDURE — 5A12012 PERFORMANCE OF CARDIAC OUTPUT, SINGLE, MANUAL: ICD-10-PCS | Performed by: INTERNAL MEDICINE

## 2018-01-01 PROCEDURE — 25010000002 AMIODARONE IN DEXTROSE 5% 360-4.14 MG/200ML-% SOLUTION: Performed by: INTERNAL MEDICINE

## 2018-01-01 PROCEDURE — 83880 ASSAY OF NATRIURETIC PEPTIDE: CPT | Performed by: PHYSICIAN ASSISTANT

## 2018-01-01 PROCEDURE — 25010000002 METHYLPREDNISOLONE PER 40 MG: Performed by: INTERNAL MEDICINE

## 2018-01-01 PROCEDURE — 85027 COMPLETE CBC AUTOMATED: CPT | Performed by: INTERNAL MEDICINE

## 2018-01-01 PROCEDURE — 99285 EMERGENCY DEPT VISIT HI MDM: CPT

## 2018-01-01 PROCEDURE — 80053 COMPREHEN METABOLIC PANEL: CPT | Performed by: INTERNAL MEDICINE

## 2018-01-01 PROCEDURE — 87040 BLOOD CULTURE FOR BACTERIA: CPT | Performed by: PHYSICIAN ASSISTANT

## 2018-01-01 PROCEDURE — 87116 MYCOBACTERIA CULTURE: CPT | Performed by: INTERNAL MEDICINE

## 2018-01-01 PROCEDURE — 97110 THERAPEUTIC EXERCISES: CPT

## 2018-01-01 PROCEDURE — 88112 CYTOPATH CELL ENHANCE TECH: CPT | Performed by: INTERNAL MEDICINE

## 2018-01-01 PROCEDURE — 93005 ELECTROCARDIOGRAM TRACING: CPT | Performed by: PHYSICIAN ASSISTANT

## 2018-01-01 PROCEDURE — 70450 CT HEAD/BRAIN W/O DYE: CPT

## 2018-01-01 PROCEDURE — 71275 CT ANGIOGRAPHY CHEST: CPT

## 2018-01-01 PROCEDURE — 25010000003 CEFTRIAXONE PER 250 MG: Performed by: EMERGENCY MEDICINE

## 2018-01-01 PROCEDURE — 25010000002 MORPHINE PER 10 MG: Performed by: HOSPITALIST

## 2018-01-01 PROCEDURE — 84145 PROCALCITONIN (PCT): CPT | Performed by: EMERGENCY MEDICINE

## 2018-01-01 PROCEDURE — 83735 ASSAY OF MAGNESIUM: CPT | Performed by: INTERNAL MEDICINE

## 2018-01-01 PROCEDURE — 25010000002 PROPOFOL 10 MG/ML EMULSION

## 2018-01-01 PROCEDURE — 02HV33Z INSERTION OF INFUSION DEVICE INTO SUPERIOR VENA CAVA, PERCUTANEOUS APPROACH: ICD-10-PCS | Performed by: INTERNAL MEDICINE

## 2018-01-01 PROCEDURE — 83605 ASSAY OF LACTIC ACID: CPT | Performed by: PHYSICIAN ASSISTANT

## 2018-01-01 PROCEDURE — 85379 FIBRIN DEGRADATION QUANT: CPT | Performed by: EMERGENCY MEDICINE

## 2018-01-01 PROCEDURE — 84145 PROCALCITONIN (PCT): CPT | Performed by: INTERNAL MEDICINE

## 2018-01-01 PROCEDURE — 87070 CULTURE OTHR SPECIMN AEROBIC: CPT | Performed by: INTERNAL MEDICINE

## 2018-01-01 PROCEDURE — 0B9G8ZX DRAINAGE OF LEFT UPPER LUNG LOBE, VIA NATURAL OR ARTIFICIAL OPENING ENDOSCOPIC, DIAGNOSTIC: ICD-10-PCS | Performed by: INTERNAL MEDICINE

## 2018-01-01 PROCEDURE — 94660 CPAP INITIATION&MGMT: CPT

## 2018-01-01 PROCEDURE — 85730 THROMBOPLASTIN TIME PARTIAL: CPT | Performed by: INTERNAL MEDICINE

## 2018-01-01 PROCEDURE — 25010000002 HYDROMORPHONE 1 MG/ML SOLUTION: Performed by: HOSPITALIST

## 2018-01-01 PROCEDURE — 87071 CULTURE AEROBIC QUANT OTHER: CPT | Performed by: INTERNAL MEDICINE

## 2018-01-01 PROCEDURE — 87798 DETECT AGENT NOS DNA AMP: CPT | Performed by: INTERNAL MEDICINE

## 2018-01-01 PROCEDURE — 84484 ASSAY OF TROPONIN QUANT: CPT | Performed by: INTERNAL MEDICINE

## 2018-01-01 PROCEDURE — 83735 ASSAY OF MAGNESIUM: CPT | Performed by: EMERGENCY MEDICINE

## 2018-01-01 PROCEDURE — B548ZZA ULTRASONOGRAPHY OF SUPERIOR VENA CAVA, GUIDANCE: ICD-10-PCS | Performed by: INTERNAL MEDICINE

## 2018-01-01 PROCEDURE — 93306 TTE W/DOPPLER COMPLETE: CPT

## 2018-01-01 PROCEDURE — 97162 PT EVAL MOD COMPLEX 30 MIN: CPT

## 2018-01-01 PROCEDURE — 0 IOPAMIDOL PER 1 ML: Performed by: EMERGENCY MEDICINE

## 2018-01-01 PROCEDURE — 25010000002 AMIODARONE PER 30 MG: Performed by: INTERNAL MEDICINE

## 2018-01-01 PROCEDURE — 25010000002 ADENOSINE PER 6 MG: Performed by: INTERNAL MEDICINE

## 2018-01-01 PROCEDURE — 85025 COMPLETE CBC W/AUTO DIFF WBC: CPT | Performed by: PHYSICIAN ASSISTANT

## 2018-01-01 PROCEDURE — 31500 INSERT EMERGENCY AIRWAY: CPT

## 2018-01-01 PROCEDURE — 84100 ASSAY OF PHOSPHORUS: CPT | Performed by: INTERNAL MEDICINE

## 2018-01-01 PROCEDURE — 87633 RESP VIRUS 12-25 TARGETS: CPT | Performed by: INTERNAL MEDICINE

## 2018-01-01 PROCEDURE — 5A1945Z RESPIRATORY VENTILATION, 24-96 CONSECUTIVE HOURS: ICD-10-PCS | Performed by: INTERNAL MEDICINE

## 2018-01-01 PROCEDURE — 87206 SMEAR FLUORESCENT/ACID STAI: CPT | Performed by: INTERNAL MEDICINE

## 2018-01-01 PROCEDURE — 25010000002 METHYLPREDNISOLONE PER 125 MG: Performed by: EMERGENCY MEDICINE

## 2018-01-01 PROCEDURE — 85610 PROTHROMBIN TIME: CPT | Performed by: INTERNAL MEDICINE

## 2018-01-01 PROCEDURE — 93306 TTE W/DOPPLER COMPLETE: CPT | Performed by: INTERNAL MEDICINE

## 2018-01-01 PROCEDURE — 99222 1ST HOSP IP/OBS MODERATE 55: CPT | Performed by: INTERNAL MEDICINE

## 2018-01-01 PROCEDURE — 80053 COMPREHEN METABOLIC PANEL: CPT | Performed by: PHYSICIAN ASSISTANT

## 2018-01-01 PROCEDURE — 25010000002 LIDOCAINE PER 10 MG

## 2018-01-01 RX ORDER — ACETAMINOPHEN 650 MG/1
650 SUPPOSITORY RECTAL EVERY 4 HOURS PRN
Status: CANCELLED | OUTPATIENT
Start: 2018-01-01

## 2018-01-01 RX ORDER — MORPHINE SULFATE 20 MG/ML
10 SOLUTION ORAL
Status: CANCELLED | OUTPATIENT
Start: 2018-01-01 | End: 2018-10-24

## 2018-01-01 RX ORDER — PROPOFOL 10 MG/ML
VIAL (ML) INTRAVENOUS
Status: COMPLETED
Start: 2018-01-01 | End: 2018-01-01

## 2018-01-01 RX ORDER — ACETAMINOPHEN 325 MG/1
650 TABLET ORAL EVERY 4 HOURS PRN
Status: CANCELLED | OUTPATIENT
Start: 2018-01-01

## 2018-01-01 RX ORDER — LORAZEPAM 2 MG/ML
2 CONCENTRATE ORAL
Status: DISCONTINUED | OUTPATIENT
Start: 2018-01-01 | End: 2018-01-01 | Stop reason: HOSPADM

## 2018-01-01 RX ORDER — LORAZEPAM 2 MG/ML
2 CONCENTRATE ORAL
Status: DISCONTINUED | OUTPATIENT
Start: 2018-01-01 | End: 2018-10-20 | Stop reason: HOSPADM

## 2018-01-01 RX ORDER — ALBUTEROL SULFATE 90 UG/1
6 AEROSOL, METERED RESPIRATORY (INHALATION)
Status: DISCONTINUED | OUTPATIENT
Start: 2018-01-01 | End: 2018-01-01

## 2018-01-01 RX ORDER — NICOTINE 21 MG/24HR
1 PATCH, TRANSDERMAL 24 HOURS TRANSDERMAL
Status: DISCONTINUED | OUTPATIENT
Start: 2018-01-01 | End: 2018-01-01

## 2018-01-01 RX ORDER — LORAZEPAM 2 MG/ML
2 INJECTION INTRAMUSCULAR
Status: DISCONTINUED | OUTPATIENT
Start: 2018-01-01 | End: 2018-10-20 | Stop reason: HOSPADM

## 2018-01-01 RX ORDER — MORPHINE SULFATE 20 MG/ML
10 SOLUTION ORAL
Status: DISCONTINUED | OUTPATIENT
Start: 2018-01-01 | End: 2018-01-01 | Stop reason: HOSPADM

## 2018-01-01 RX ORDER — ACETAMINOPHEN 500 MG
1000 TABLET ORAL ONCE
Status: COMPLETED | OUTPATIENT
Start: 2018-01-01 | End: 2018-01-01

## 2018-01-01 RX ORDER — ACETYLCYSTEINE 200 MG/ML
4 SOLUTION ORAL; RESPIRATORY (INHALATION)
Status: DISCONTINUED | OUTPATIENT
Start: 2018-01-01 | End: 2018-01-01

## 2018-01-01 RX ORDER — FUROSEMIDE 10 MG/ML
20 INJECTION INTRAMUSCULAR; INTRAVENOUS ONCE
Status: COMPLETED | OUTPATIENT
Start: 2018-01-01 | End: 2018-01-01

## 2018-01-01 RX ORDER — DIPHENHYDRAMINE HCL 25 MG
25 CAPSULE ORAL EVERY MORNING
COMMUNITY

## 2018-01-01 RX ORDER — IPRATROPIUM BROMIDE AND ALBUTEROL SULFATE 2.5; .5 MG/3ML; MG/3ML
6 SOLUTION RESPIRATORY (INHALATION) ONCE
Status: COMPLETED | OUTPATIENT
Start: 2018-01-01 | End: 2018-01-01

## 2018-01-01 RX ORDER — ALBUTEROL SULFATE 90 UG/1
2 AEROSOL, METERED RESPIRATORY (INHALATION)
Status: DISCONTINUED | OUTPATIENT
Start: 2018-01-01 | End: 2018-01-01

## 2018-01-01 RX ORDER — ALPRAZOLAM 0.5 MG/1
0.5 TABLET ORAL ONCE
Status: COMPLETED | OUTPATIENT
Start: 2018-01-01 | End: 2018-01-01

## 2018-01-01 RX ORDER — MORPHINE SULFATE 20 MG/ML
10 SOLUTION ORAL
Status: DISCONTINUED | OUTPATIENT
Start: 2018-01-01 | End: 2018-01-01

## 2018-01-01 RX ORDER — LORAZEPAM 2 MG/ML
1 CONCENTRATE ORAL
Status: DISCONTINUED | OUTPATIENT
Start: 2018-01-01 | End: 2018-10-20 | Stop reason: HOSPADM

## 2018-01-01 RX ORDER — GLYCOPYRROLATE 0.2 MG/ML
0.4 INJECTION INTRAMUSCULAR; INTRAVENOUS
Status: CANCELLED | OUTPATIENT
Start: 2018-01-01

## 2018-01-01 RX ORDER — LIDOCAINE HYDROCHLORIDE ANHYDROUS AND DEXTROSE MONOHYDRATE 5; 400 G/100ML; MG/100ML
0.5 INJECTION, SOLUTION INTRAVENOUS CONTINUOUS
Status: DISCONTINUED | OUTPATIENT
Start: 2018-01-01 | End: 2018-01-01

## 2018-01-01 RX ORDER — ACETAMINOPHEN 325 MG/1
650 TABLET ORAL EVERY 4 HOURS PRN
Status: DISCONTINUED | OUTPATIENT
Start: 2018-01-01 | End: 2018-01-01 | Stop reason: HOSPADM

## 2018-01-01 RX ORDER — DIGOXIN 0.25 MG/ML
250 INJECTION INTRAMUSCULAR; INTRAVENOUS
Status: DISCONTINUED | OUTPATIENT
Start: 2018-01-01 | End: 2018-01-01

## 2018-01-01 RX ORDER — DIPHENOXYLATE HYDROCHLORIDE AND ATROPINE SULFATE 2.5; .025 MG/1; MG/1
1 TABLET ORAL
Status: DISCONTINUED | OUTPATIENT
Start: 2018-01-01 | End: 2018-01-01 | Stop reason: HOSPADM

## 2018-01-01 RX ORDER — LORAZEPAM 2 MG/ML
2 INJECTION INTRAMUSCULAR
Status: CANCELLED | OUTPATIENT
Start: 2018-01-01 | End: 2018-10-24

## 2018-01-01 RX ORDER — HYDROMORPHONE HCL 110MG/55ML
1.5 PATIENT CONTROLLED ANALGESIA SYRINGE INTRAVENOUS
Status: DISCONTINUED | OUTPATIENT
Start: 2018-01-01 | End: 2018-10-20 | Stop reason: HOSPADM

## 2018-01-01 RX ORDER — ACETAMINOPHEN 160 MG/5ML
650 SOLUTION ORAL EVERY 4 HOURS PRN
Status: CANCELLED | OUTPATIENT
Start: 2018-01-01

## 2018-01-01 RX ORDER — SCOLOPAMINE TRANSDERMAL SYSTEM 1 MG/1
1 PATCH, EXTENDED RELEASE TRANSDERMAL
Status: CANCELLED | OUTPATIENT
Start: 2018-01-01

## 2018-01-01 RX ORDER — DIPHENOXYLATE HYDROCHLORIDE AND ATROPINE SULFATE 2.5; .025 MG/1; MG/1
1 TABLET ORAL
Status: CANCELLED | OUTPATIENT
Start: 2018-01-01

## 2018-01-01 RX ORDER — LORAZEPAM 2 MG/ML
0.5 INJECTION INTRAMUSCULAR
Status: CANCELLED | OUTPATIENT
Start: 2018-01-01 | End: 2018-10-24

## 2018-01-01 RX ORDER — LORAZEPAM 2 MG/ML
0.5 INJECTION INTRAMUSCULAR
Status: DISCONTINUED | OUTPATIENT
Start: 2018-01-01 | End: 2018-01-01 | Stop reason: HOSPADM

## 2018-01-01 RX ORDER — FENTANYL CITRATE 50 UG/ML
100 INJECTION, SOLUTION INTRAMUSCULAR; INTRAVENOUS ONCE
Status: COMPLETED | OUTPATIENT
Start: 2018-01-01 | End: 2018-01-01

## 2018-01-01 RX ORDER — IPRATROPIUM BROMIDE AND ALBUTEROL SULFATE 2.5; .5 MG/3ML; MG/3ML
3 SOLUTION RESPIRATORY (INHALATION)
Status: DISCONTINUED | OUTPATIENT
Start: 2018-01-01 | End: 2018-01-01

## 2018-01-01 RX ORDER — NOREPINEPHRINE BIT/0.9 % NACL 8 MG/250ML
.02-.3 INFUSION BOTTLE (ML) INTRAVENOUS
Status: DISCONTINUED | OUTPATIENT
Start: 2018-01-01 | End: 2018-01-01

## 2018-01-01 RX ORDER — LORAZEPAM 2 MG/ML
2 INJECTION INTRAMUSCULAR
Status: DISCONTINUED | OUTPATIENT
Start: 2018-01-01 | End: 2018-01-01 | Stop reason: HOSPADM

## 2018-01-01 RX ORDER — DEXTROSE MONOHYDRATE 25 G/50ML
25 INJECTION, SOLUTION INTRAVENOUS
Status: DISCONTINUED | OUTPATIENT
Start: 2018-01-01 | End: 2018-01-01 | Stop reason: HOSPADM

## 2018-01-01 RX ORDER — DOXYCYCLINE 100 MG/1
100 CAPSULE ORAL EVERY 12 HOURS SCHEDULED
Status: DISCONTINUED | OUTPATIENT
Start: 2018-01-01 | End: 2018-01-01 | Stop reason: CLARIF

## 2018-01-01 RX ORDER — SCOLOPAMINE TRANSDERMAL SYSTEM 1 MG/1
1 PATCH, EXTENDED RELEASE TRANSDERMAL
Status: DISCONTINUED | OUTPATIENT
Start: 2018-01-01 | End: 2018-01-01 | Stop reason: HOSPADM

## 2018-01-01 RX ORDER — MIDAZOLAM HYDROCHLORIDE 1 MG/ML
5 INJECTION INTRAMUSCULAR; INTRAVENOUS ONCE
Status: DISCONTINUED | OUTPATIENT
Start: 2018-01-01 | End: 2018-01-01

## 2018-01-01 RX ORDER — LORAZEPAM 2 MG/ML
INJECTION INTRAMUSCULAR
Status: DISPENSED
Start: 2018-01-01 | End: 2018-01-01

## 2018-01-01 RX ORDER — LIDOCAINE HYDROCHLORIDE 10 MG/ML
INJECTION, SOLUTION EPIDURAL; INFILTRATION; INTRACAUDAL; PERINEURAL AS NEEDED
Status: DISCONTINUED | OUTPATIENT
Start: 2018-01-01 | End: 2018-01-01 | Stop reason: HOSPADM

## 2018-01-01 RX ORDER — SODIUM CHLORIDE 0.9 % (FLUSH) 0.9 %
3-10 SYRINGE (ML) INJECTION AS NEEDED
Status: DISCONTINUED | OUTPATIENT
Start: 2018-01-01 | End: 2018-01-01

## 2018-01-01 RX ORDER — ACETAMINOPHEN 160 MG/5ML
650 SOLUTION ORAL EVERY 4 HOURS PRN
Status: DISCONTINUED | OUTPATIENT
Start: 2018-01-01 | End: 2018-01-01 | Stop reason: HOSPADM

## 2018-01-01 RX ORDER — DIPHENOXYLATE HYDROCHLORIDE AND ATROPINE SULFATE 2.5; .025 MG/1; MG/1
1 TABLET ORAL
Status: DISCONTINUED | OUTPATIENT
Start: 2018-01-01 | End: 2018-10-20 | Stop reason: HOSPADM

## 2018-01-01 RX ORDER — CEFTRIAXONE SODIUM 1 G/50ML
1 INJECTION, SOLUTION INTRAVENOUS EVERY 24 HOURS
Status: DISCONTINUED | OUTPATIENT
Start: 2018-01-01 | End: 2018-01-01

## 2018-01-01 RX ORDER — LORAZEPAM 2 MG/ML
2 CONCENTRATE ORAL
Status: CANCELLED | OUTPATIENT
Start: 2018-01-01 | End: 2018-10-24

## 2018-01-01 RX ORDER — ACETAMINOPHEN 500 MG
500 TABLET ORAL EVERY 6 HOURS PRN
COMMUNITY

## 2018-01-01 RX ORDER — LORAZEPAM 2 MG/ML
0.5 INJECTION INTRAMUSCULAR
Status: DISCONTINUED | OUTPATIENT
Start: 2018-01-01 | End: 2018-10-20 | Stop reason: HOSPADM

## 2018-01-01 RX ORDER — AMIODARONE HYDROCHLORIDE 200 MG/1
200 TABLET ORAL EVERY 12 HOURS SCHEDULED
Status: DISCONTINUED | OUTPATIENT
Start: 2018-01-01 | End: 2018-01-01

## 2018-01-01 RX ORDER — ACETAMINOPHEN 160 MG/5ML
650 SOLUTION ORAL EVERY 4 HOURS PRN
Status: DISCONTINUED | OUTPATIENT
Start: 2018-01-01 | End: 2018-10-20 | Stop reason: HOSPADM

## 2018-01-01 RX ORDER — PROPOFOL 10 MG/ML
VIAL (ML) INTRAVENOUS
Status: DISCONTINUED
Start: 2018-01-01 | End: 2018-01-01 | Stop reason: WASHOUT

## 2018-01-01 RX ORDER — LORAZEPAM 2 MG/ML
1 CONCENTRATE ORAL
Status: CANCELLED | OUTPATIENT
Start: 2018-01-01 | End: 2018-10-24

## 2018-01-01 RX ORDER — SODIUM CHLORIDE 0.9 % (FLUSH) 0.9 %
3 SYRINGE (ML) INJECTION EVERY 12 HOURS SCHEDULED
Status: DISCONTINUED | OUTPATIENT
Start: 2018-01-01 | End: 2018-01-01

## 2018-01-01 RX ORDER — FUROSEMIDE 10 MG/ML
40 INJECTION INTRAMUSCULAR; INTRAVENOUS ONCE
Status: COMPLETED | OUTPATIENT
Start: 2018-01-01 | End: 2018-01-01

## 2018-01-01 RX ORDER — IPRATROPIUM BROMIDE AND ALBUTEROL SULFATE 2.5; .5 MG/3ML; MG/3ML
3 SOLUTION RESPIRATORY (INHALATION) EVERY 4 HOURS PRN
Status: DISCONTINUED | OUTPATIENT
Start: 2018-01-01 | End: 2018-01-01

## 2018-01-01 RX ORDER — ONDANSETRON 2 MG/ML
4 INJECTION INTRAMUSCULAR; INTRAVENOUS EVERY 6 HOURS PRN
Status: CANCELLED | OUTPATIENT
Start: 2018-01-01

## 2018-01-01 RX ORDER — METHYLPREDNISOLONE SODIUM SUCCINATE 125 MG/2ML
60 INJECTION, POWDER, LYOPHILIZED, FOR SOLUTION INTRAMUSCULAR; INTRAVENOUS ONCE
Status: COMPLETED | OUTPATIENT
Start: 2018-01-01 | End: 2018-01-01

## 2018-01-01 RX ORDER — SCOLOPAMINE TRANSDERMAL SYSTEM 1 MG/1
1 PATCH, EXTENDED RELEASE TRANSDERMAL
Status: DISCONTINUED | OUTPATIENT
Start: 2018-01-01 | End: 2018-10-20 | Stop reason: HOSPADM

## 2018-01-01 RX ORDER — MORPHINE SULFATE 10 MG/ML
6 INJECTION INTRAMUSCULAR; INTRAVENOUS; SUBCUTANEOUS
Status: DISCONTINUED | OUTPATIENT
Start: 2018-01-01 | End: 2018-10-20 | Stop reason: HOSPADM

## 2018-01-01 RX ORDER — LORAZEPAM 2 MG/ML
1 INJECTION INTRAMUSCULAR
Status: CANCELLED | OUTPATIENT
Start: 2018-01-01 | End: 2018-10-24

## 2018-01-01 RX ORDER — METHYLPREDNISOLONE SODIUM SUCCINATE 125 MG/2ML
60 INJECTION, POWDER, LYOPHILIZED, FOR SOLUTION INTRAMUSCULAR; INTRAVENOUS ONCE
Status: DISCONTINUED | OUTPATIENT
Start: 2018-01-01 | End: 2018-01-01

## 2018-01-01 RX ORDER — FENTANYL CITRATE 50 UG/ML
50 INJECTION, SOLUTION INTRAMUSCULAR; INTRAVENOUS
Status: CANCELLED | OUTPATIENT
Start: 2018-01-01 | End: 2018-10-21

## 2018-01-01 RX ORDER — FENTANYL CITRATE 50 UG/ML
50 INJECTION, SOLUTION INTRAMUSCULAR; INTRAVENOUS ONCE
Status: DISCONTINUED | OUTPATIENT
Start: 2018-01-01 | End: 2018-01-01

## 2018-01-01 RX ORDER — METHYLPREDNISOLONE SODIUM SUCCINATE 125 MG/2ML
125 INJECTION, POWDER, LYOPHILIZED, FOR SOLUTION INTRAMUSCULAR; INTRAVENOUS ONCE
Status: COMPLETED | OUTPATIENT
Start: 2018-01-01 | End: 2018-01-01

## 2018-01-01 RX ORDER — DEXTROSE MONOHYDRATE 25 G/50ML
25 INJECTION, SOLUTION INTRAVENOUS
Status: CANCELLED | OUTPATIENT
Start: 2018-01-01

## 2018-01-01 RX ORDER — ALBUTEROL SULFATE 90 UG/1
2 AEROSOL, METERED RESPIRATORY (INHALATION) EVERY 4 HOURS PRN
Status: DISCONTINUED | OUTPATIENT
Start: 2018-01-01 | End: 2018-01-01

## 2018-01-01 RX ORDER — NICOTINE POLACRILEX 4 MG
15 LOZENGE BUCCAL
Status: DISCONTINUED | OUTPATIENT
Start: 2018-01-01 | End: 2018-01-01 | Stop reason: HOSPADM

## 2018-01-01 RX ORDER — FAMOTIDINE 10 MG/ML
20 INJECTION, SOLUTION INTRAVENOUS EVERY 12 HOURS SCHEDULED
Status: DISCONTINUED | OUTPATIENT
Start: 2018-01-01 | End: 2018-01-01

## 2018-01-01 RX ORDER — FENTANYL CITRATE 50 UG/ML
INJECTION, SOLUTION INTRAMUSCULAR; INTRAVENOUS
Status: COMPLETED
Start: 2018-01-01 | End: 2018-01-01

## 2018-01-01 RX ORDER — ACETAMINOPHEN 650 MG/1
650 SUPPOSITORY RECTAL EVERY 4 HOURS PRN
Status: DISCONTINUED | OUTPATIENT
Start: 2018-01-01 | End: 2018-10-20 | Stop reason: HOSPADM

## 2018-01-01 RX ORDER — ACETAMINOPHEN 650 MG/1
650 SUPPOSITORY RECTAL EVERY 4 HOURS PRN
Status: DISCONTINUED | OUTPATIENT
Start: 2018-01-01 | End: 2018-01-01 | Stop reason: HOSPADM

## 2018-01-01 RX ORDER — MORPHINE SULFATE 10 MG/ML
6 INJECTION INTRAMUSCULAR; INTRAVENOUS; SUBCUTANEOUS
Status: CANCELLED | OUTPATIENT
Start: 2018-01-01 | End: 2018-10-25

## 2018-01-01 RX ORDER — LORAZEPAM 2 MG/ML
0.5 CONCENTRATE ORAL
Status: DISCONTINUED | OUTPATIENT
Start: 2018-01-01 | End: 2018-01-01 | Stop reason: HOSPADM

## 2018-01-01 RX ORDER — METHYLPREDNISOLONE SODIUM SUCCINATE 40 MG/ML
40 INJECTION, POWDER, LYOPHILIZED, FOR SOLUTION INTRAMUSCULAR; INTRAVENOUS EVERY 12 HOURS
Status: DISCONTINUED | OUTPATIENT
Start: 2018-01-01 | End: 2018-01-01

## 2018-01-01 RX ORDER — LORAZEPAM 2 MG/ML
1 CONCENTRATE ORAL
Status: DISCONTINUED | OUTPATIENT
Start: 2018-01-01 | End: 2018-01-01 | Stop reason: HOSPADM

## 2018-01-01 RX ORDER — FENTANYL CITRATE 50 UG/ML
50 INJECTION, SOLUTION INTRAMUSCULAR; INTRAVENOUS
Status: DISCONTINUED | OUTPATIENT
Start: 2018-01-01 | End: 2018-01-01 | Stop reason: HOSPADM

## 2018-01-01 RX ORDER — MORPHINE SULFATE 20 MG/ML
10 SOLUTION ORAL
Status: DISCONTINUED | OUTPATIENT
Start: 2018-01-01 | End: 2018-10-20 | Stop reason: HOSPADM

## 2018-01-01 RX ORDER — NICOTINE POLACRILEX 4 MG
15 LOZENGE BUCCAL
Status: CANCELLED | OUTPATIENT
Start: 2018-01-01

## 2018-01-01 RX ORDER — METHYLPREDNISOLONE SODIUM SUCCINATE 125 MG/2ML
125 INJECTION, POWDER, LYOPHILIZED, FOR SOLUTION INTRAMUSCULAR; INTRAVENOUS ONCE
Status: DISCONTINUED | OUTPATIENT
Start: 2018-01-01 | End: 2018-01-01

## 2018-01-01 RX ORDER — ADENOSINE 3 MG/ML
6 INJECTION, SOLUTION INTRAVENOUS ONCE
Status: COMPLETED | OUTPATIENT
Start: 2018-01-01 | End: 2018-01-01

## 2018-01-01 RX ORDER — LORAZEPAM 2 MG/ML
1 INJECTION INTRAMUSCULAR
Status: DISCONTINUED | OUTPATIENT
Start: 2018-01-01 | End: 2018-10-20 | Stop reason: HOSPADM

## 2018-01-01 RX ORDER — ACETAMINOPHEN 325 MG/1
650 TABLET ORAL EVERY 4 HOURS PRN
Status: DISCONTINUED | OUTPATIENT
Start: 2018-01-01 | End: 2018-10-20 | Stop reason: HOSPADM

## 2018-01-01 RX ORDER — LORAZEPAM 2 MG/ML
1 INJECTION INTRAMUSCULAR
Status: DISCONTINUED | OUTPATIENT
Start: 2018-01-01 | End: 2018-01-01 | Stop reason: HOSPADM

## 2018-01-01 RX ORDER — CEFTRIAXONE SODIUM 2 G/50ML
2 INJECTION, SOLUTION INTRAVENOUS ONCE
Status: COMPLETED | OUTPATIENT
Start: 2018-01-01 | End: 2018-01-01

## 2018-01-01 RX ORDER — FENTANYL CITRATE 50 UG/ML
25 INJECTION, SOLUTION INTRAMUSCULAR; INTRAVENOUS
Status: DISCONTINUED | OUTPATIENT
Start: 2018-01-01 | End: 2018-01-01 | Stop reason: HOSPADM

## 2018-01-01 RX ORDER — AMIODARONE HYDROCHLORIDE 50 MG/ML
INJECTION, SOLUTION INTRAVENOUS
Status: COMPLETED | OUTPATIENT
Start: 2018-01-01 | End: 2018-01-01

## 2018-01-01 RX ORDER — LORAZEPAM 2 MG/ML
0.5 CONCENTRATE ORAL
Status: DISCONTINUED | OUTPATIENT
Start: 2018-01-01 | End: 2018-10-20 | Stop reason: HOSPADM

## 2018-01-01 RX ORDER — IPRATROPIUM BROMIDE AND ALBUTEROL SULFATE 2.5; .5 MG/3ML; MG/3ML
3 SOLUTION RESPIRATORY (INHALATION) ONCE
Status: COMPLETED | OUTPATIENT
Start: 2018-01-01 | End: 2018-01-01

## 2018-01-01 RX ORDER — METOPROLOL TARTRATE 5 MG/5ML
INJECTION INTRAVENOUS
Status: COMPLETED
Start: 2018-01-01 | End: 2018-01-01

## 2018-01-01 RX ORDER — GLYCOPYRROLATE 0.2 MG/ML
0.4 INJECTION INTRAMUSCULAR; INTRAVENOUS
Status: DISCONTINUED | OUTPATIENT
Start: 2018-01-01 | End: 2018-10-20 | Stop reason: HOSPADM

## 2018-01-01 RX ORDER — FENTANYL CITRATE 50 UG/ML
25 INJECTION, SOLUTION INTRAMUSCULAR; INTRAVENOUS ONCE
Status: DISCONTINUED | OUTPATIENT
Start: 2018-01-01 | End: 2018-01-01

## 2018-01-01 RX ORDER — FENTANYL CITRATE 50 UG/ML
25 INJECTION, SOLUTION INTRAMUSCULAR; INTRAVENOUS
Status: CANCELLED | OUTPATIENT
Start: 2018-01-01 | End: 2018-10-21

## 2018-01-01 RX ORDER — LIDOCAINE HYDROCHLORIDE ANHYDROUS AND DEXTROSE MONOHYDRATE 5; 400 G/100ML; MG/100ML
INJECTION, SOLUTION INTRAVENOUS
Status: COMPLETED
Start: 2018-01-01 | End: 2018-01-01

## 2018-01-01 RX ORDER — ALBUTEROL SULFATE 2.5 MG/3ML
2.5 SOLUTION RESPIRATORY (INHALATION)
Status: COMPLETED | OUTPATIENT
Start: 2018-01-01 | End: 2018-01-01

## 2018-01-01 RX ORDER — LORAZEPAM 2 MG/ML
0.5 CONCENTRATE ORAL
Status: CANCELLED | OUTPATIENT
Start: 2018-01-01 | End: 2018-10-24

## 2018-01-01 RX ORDER — GLYCOPYRROLATE 0.2 MG/ML
0.4 INJECTION INTRAMUSCULAR; INTRAVENOUS
Status: DISCONTINUED | OUTPATIENT
Start: 2018-01-01 | End: 2018-01-01 | Stop reason: HOSPADM

## 2018-01-01 RX ORDER — MORPHINE SULFATE 10 MG/ML
6 INJECTION INTRAMUSCULAR; INTRAVENOUS; SUBCUTANEOUS
Status: DISCONTINUED | OUTPATIENT
Start: 2018-01-01 | End: 2018-01-01 | Stop reason: HOSPADM

## 2018-01-01 RX ORDER — ONDANSETRON 2 MG/ML
4 INJECTION INTRAMUSCULAR; INTRAVENOUS EVERY 6 HOURS PRN
Status: DISCONTINUED | OUTPATIENT
Start: 2018-01-01 | End: 2018-01-01 | Stop reason: HOSPADM

## 2018-01-01 RX ADMIN — LIDOCAINE HYDROCHLORIDE 100 MG: 20 INJECTION INTRAVENOUS at 21:10

## 2018-01-01 RX ADMIN — MORPHINE SULFATE 4 MG: 4 INJECTION, SOLUTION INTRAMUSCULAR; INTRAVENOUS at 04:50

## 2018-01-01 RX ADMIN — ACETYLCYSTEINE 4 ML: 200 SOLUTION ORAL; RESPIRATORY (INHALATION) at 15:32

## 2018-01-01 RX ADMIN — PROPOFOL 30 MCG/KG/MIN: 10 INJECTION, EMULSION INTRAVENOUS at 11:03

## 2018-01-01 RX ADMIN — LORAZEPAM 1 MG: 2 INJECTION INTRAMUSCULAR; INTRAVENOUS at 23:43

## 2018-01-01 RX ADMIN — HYDROMORPHONE HYDROCHLORIDE 1 MG: 1 INJECTION, SOLUTION INTRAMUSCULAR; INTRAVENOUS; SUBCUTANEOUS at 20:37

## 2018-01-01 RX ADMIN — ENOXAPARIN SODIUM 30 MG: 30 INJECTION SUBCUTANEOUS at 05:41

## 2018-01-01 RX ADMIN — LORAZEPAM 2 MG: 2 INJECTION INTRAMUSCULAR; INTRAVENOUS at 13:27

## 2018-01-01 RX ADMIN — LORAZEPAM 2 MG: 2 INJECTION INTRAMUSCULAR; INTRAVENOUS at 01:14

## 2018-01-01 RX ADMIN — PROPOFOL 5 MG: 10 INJECTION, EMULSION INTRAVENOUS at 02:25

## 2018-01-01 RX ADMIN — GLYCOPYRROLATE 0.4 MG: 0.2 INJECTION, SOLUTION INTRAMUSCULAR; INTRAVENOUS at 21:15

## 2018-01-01 RX ADMIN — ALBUTEROL SULFATE 6 PUFF: 90 AEROSOL, METERED RESPIRATORY (INHALATION) at 23:18

## 2018-01-01 RX ADMIN — LORAZEPAM 1 MG: 2 INJECTION INTRAMUSCULAR; INTRAVENOUS at 04:51

## 2018-01-01 RX ADMIN — ALBUTEROL SULFATE 6 PUFF: 90 AEROSOL, METERED RESPIRATORY (INHALATION) at 07:22

## 2018-01-01 RX ADMIN — PROPOFOL 25 MCG/KG/MIN: 10 INJECTION, EMULSION INTRAVENOUS at 16:54

## 2018-01-01 RX ADMIN — DOXYCYCLINE 100 MG: 100 INJECTION, POWDER, LYOPHILIZED, FOR SOLUTION INTRAVENOUS at 14:07

## 2018-01-01 RX ADMIN — HYDROMORPHONE HYDROCHLORIDE 1 MG: 1 INJECTION, SOLUTION INTRAMUSCULAR; INTRAVENOUS; SUBCUTANEOUS at 16:54

## 2018-01-01 RX ADMIN — FUROSEMIDE 40 MG: 10 INJECTION, SOLUTION INTRAMUSCULAR; INTRAVENOUS at 01:51

## 2018-01-01 RX ADMIN — ACETAMINOPHEN 500 MG TABLET 1000 MG: at 17:53

## 2018-01-01 RX ADMIN — ALBUTEROL SULFATE 6 PUFF: 90 AEROSOL, METERED RESPIRATORY (INHALATION) at 03:26

## 2018-01-01 RX ADMIN — HYDROMORPHONE HYDROCHLORIDE 1.5 MG: 2 INJECTION INTRAMUSCULAR; INTRAVENOUS; SUBCUTANEOUS at 13:40

## 2018-01-01 RX ADMIN — LORAZEPAM 2 MG: 2 INJECTION INTRAMUSCULAR; INTRAVENOUS at 08:56

## 2018-01-01 RX ADMIN — PROPOFOL 35 MCG/KG/MIN: 10 INJECTION, EMULSION INTRAVENOUS at 10:14

## 2018-01-01 RX ADMIN — GLYCOPYRROLATE 0.4 MG: 0.2 INJECTION, SOLUTION INTRAMUSCULAR; INTRAVENOUS at 07:27

## 2018-01-01 RX ADMIN — HYDROMORPHONE HYDROCHLORIDE 1 MG: 1 INJECTION, SOLUTION INTRAMUSCULAR; INTRAVENOUS; SUBCUTANEOUS at 23:09

## 2018-01-01 RX ADMIN — DIGOXIN 250 MCG: 0.25 INJECTION INTRAMUSCULAR; INTRAVENOUS at 13:11

## 2018-01-01 RX ADMIN — MORPHINE SULFATE 4 MG: 4 INJECTION, SOLUTION INTRAMUSCULAR; INTRAVENOUS at 21:00

## 2018-01-01 RX ADMIN — GLYCOPYRROLATE 0.4 MG: 0.2 INJECTION, SOLUTION INTRAMUSCULAR; INTRAVENOUS at 06:43

## 2018-01-01 RX ADMIN — LORAZEPAM 1 MG: 2 INJECTION INTRAMUSCULAR; INTRAVENOUS at 21:14

## 2018-01-01 RX ADMIN — FENTANYL CITRATE 50 MCG: 50 INJECTION, SOLUTION INTRAMUSCULAR; INTRAVENOUS at 07:45

## 2018-01-01 RX ADMIN — FUROSEMIDE 40 MG: 10 INJECTION, SOLUTION INTRAMUSCULAR; INTRAVENOUS at 17:21

## 2018-01-01 RX ADMIN — LORAZEPAM 2 MG: 2 INJECTION INTRAMUSCULAR; INTRAVENOUS at 10:31

## 2018-01-01 RX ADMIN — NICOTINE 1 PATCH: 21 PATCH, EXTENDED RELEASE TRANSDERMAL at 08:31

## 2018-01-01 RX ADMIN — FAMOTIDINE 20 MG: 10 INJECTION, SOLUTION INTRAVENOUS at 08:08

## 2018-01-01 RX ADMIN — FAMOTIDINE 20 MG: 10 INJECTION, SOLUTION INTRAVENOUS at 00:08

## 2018-01-01 RX ADMIN — FENTANYL CITRATE 50 MCG: 50 INJECTION INTRAMUSCULAR; INTRAVENOUS at 11:02

## 2018-01-01 RX ADMIN — PROPOFOL 50 MCG/KG/MIN: 10 INJECTION, EMULSION INTRAVENOUS at 01:45

## 2018-01-01 RX ADMIN — INSULIN ASPART 2 UNITS: 100 INJECTION, SOLUTION INTRAVENOUS; SUBCUTANEOUS at 11:48

## 2018-01-01 RX ADMIN — METHYLPREDNISOLONE SODIUM SUCCINATE 40 MG: 40 INJECTION, POWDER, LYOPHILIZED, FOR SOLUTION INTRAMUSCULAR; INTRAVENOUS at 06:07

## 2018-01-01 RX ADMIN — GLYCOPYRROLATE 0.4 MG: 0.2 INJECTION, SOLUTION INTRAMUSCULAR; INTRAVENOUS at 23:43

## 2018-01-01 RX ADMIN — AMIODARONE HYDROCHLORIDE 1 MG/MIN: 1.8 INJECTION, SOLUTION INTRAVENOUS at 01:36

## 2018-01-01 RX ADMIN — LORAZEPAM 1 MG: 2 INJECTION INTRAMUSCULAR; INTRAVENOUS at 12:29

## 2018-01-01 RX ADMIN — PROPOFOL 20 MCG/KG/MIN: 10 INJECTION, EMULSION INTRAVENOUS at 08:22

## 2018-01-01 RX ADMIN — NICOTINE 1 PATCH: 21 PATCH, EXTENDED RELEASE TRANSDERMAL at 08:16

## 2018-01-01 RX ADMIN — MORPHINE SULFATE 4 MG: 4 INJECTION INTRAVENOUS at 17:54

## 2018-01-01 RX ADMIN — THIAMINE HYDROCHLORIDE 100 MG: 100 INJECTION, SOLUTION INTRAMUSCULAR; INTRAVENOUS at 11:42

## 2018-01-01 RX ADMIN — IPRATROPIUM BROMIDE AND ALBUTEROL SULFATE 3 ML: 2.5; .5 SOLUTION RESPIRATORY (INHALATION) at 18:53

## 2018-01-01 RX ADMIN — GLYCOPYRROLATE 0.4 MG: 0.2 INJECTION, SOLUTION INTRAMUSCULAR; INTRAVENOUS at 04:50

## 2018-01-01 RX ADMIN — AMIODARONE HYDROCHLORIDE 0.5 MG/MIN: 1.8 INJECTION, SOLUTION INTRAVENOUS at 16:55

## 2018-01-01 RX ADMIN — MORPHINE SULFATE 4 MG: 4 INJECTION INTRAVENOUS at 09:54

## 2018-01-01 RX ADMIN — ENOXAPARIN SODIUM 30 MG: 30 INJECTION SUBCUTANEOUS at 05:59

## 2018-01-01 RX ADMIN — NOREPINEPHRINE BITARTRATE 0.2 MCG/KG/MIN: 8 SOLUTION at 06:03

## 2018-01-01 RX ADMIN — NICOTINE 1 PATCH: 21 PATCH, EXTENDED RELEASE TRANSDERMAL at 09:28

## 2018-01-01 RX ADMIN — LORAZEPAM 1 MG: 2 INJECTION INTRAMUSCULAR; INTRAVENOUS at 18:44

## 2018-01-01 RX ADMIN — MORPHINE SULFATE 4 MG: 4 INJECTION, SOLUTION INTRAMUSCULAR; INTRAVENOUS at 07:28

## 2018-01-01 RX ADMIN — PROPOFOL 15 MCG/KG/MIN: 10 INJECTION, EMULSION INTRAVENOUS at 00:21

## 2018-01-01 RX ADMIN — ACETYLCYSTEINE 4 ML: 200 SOLUTION ORAL; RESPIRATORY (INHALATION) at 23:18

## 2018-01-01 RX ADMIN — DIGOXIN 250 MCG: 0.25 INJECTION INTRAMUSCULAR; INTRAVENOUS at 11:56

## 2018-01-01 RX ADMIN — AMIODARONE HYDROCHLORIDE 150 MG: 1.5 INJECTION, SOLUTION INTRAVENOUS at 11:08

## 2018-01-01 RX ADMIN — IPRATROPIUM BROMIDE AND ALBUTEROL SULFATE 3 ML: .5; 3 SOLUTION RESPIRATORY (INHALATION) at 17:50

## 2018-01-01 RX ADMIN — INSULIN ASPART 4 UNITS: 100 INJECTION, SOLUTION INTRAVENOUS; SUBCUTANEOUS at 00:36

## 2018-01-01 RX ADMIN — IPRATROPIUM BROMIDE AND ALBUTEROL SULFATE 3 ML: 2.5; .5 SOLUTION RESPIRATORY (INHALATION) at 23:04

## 2018-01-01 RX ADMIN — LORAZEPAM 1 MG: 2 INJECTION INTRAMUSCULAR; INTRAVENOUS at 16:38

## 2018-01-01 RX ADMIN — ACETYLCYSTEINE 4 ML: 200 SOLUTION ORAL; RESPIRATORY (INHALATION) at 23:16

## 2018-01-01 RX ADMIN — FENTANYL CITRATE 100 MCG: 50 INJECTION, SOLUTION INTRAMUSCULAR; INTRAVENOUS at 11:57

## 2018-01-01 RX ADMIN — IPRATROPIUM BROMIDE AND ALBUTEROL SULFATE 3 ML: 2.5; .5 SOLUTION RESPIRATORY (INHALATION) at 19:30

## 2018-01-01 RX ADMIN — DEXTROSE MONOHYDRATE 1 MG/MIN: 50 INJECTION, SOLUTION INTRAVENOUS at 20:35

## 2018-01-01 RX ADMIN — LORAZEPAM 2 MG: 2 INJECTION INTRAMUSCULAR; INTRAVENOUS at 20:37

## 2018-01-01 RX ADMIN — DOXYCYCLINE 100 MG: 100 INJECTION, POWDER, LYOPHILIZED, FOR SOLUTION INTRAVENOUS at 20:34

## 2018-01-01 RX ADMIN — AMIODARONE HYDROCHLORIDE 0.5 MG/MIN: 1.8 INJECTION, SOLUTION INTRAVENOUS at 23:46

## 2018-01-01 RX ADMIN — FAMOTIDINE 20 MG: 10 INJECTION, SOLUTION INTRAVENOUS at 21:51

## 2018-01-01 RX ADMIN — NICOTINE 1 PATCH: 21 PATCH, EXTENDED RELEASE TRANSDERMAL at 08:27

## 2018-01-01 RX ADMIN — GLYCOPYRROLATE 0.4 MG: 0.2 INJECTION, SOLUTION INTRAMUSCULAR; INTRAVENOUS at 00:14

## 2018-01-01 RX ADMIN — LIDOCAINE HYDROCHLORIDE ANHYDROUS AND DEXTROSE MONOHYDRATE 0.5 MG/MIN: 5; 400 INJECTION, SOLUTION INTRAVENOUS at 01:35

## 2018-01-01 RX ADMIN — PROPOFOL 40 MCG/KG/MIN: 10 INJECTION, EMULSION INTRAVENOUS at 19:04

## 2018-01-01 RX ADMIN — FENTANYL CITRATE 50 MCG: 50 INJECTION, SOLUTION INTRAMUSCULAR; INTRAVENOUS at 11:33

## 2018-01-01 RX ADMIN — GLYCOPYRROLATE 0.4 MG: 0.2 INJECTION, SOLUTION INTRAMUSCULAR; INTRAVENOUS at 08:56

## 2018-01-01 RX ADMIN — ACETYLCYSTEINE 4 ML: 200 SOLUTION ORAL; RESPIRATORY (INHALATION) at 23:20

## 2018-01-01 RX ADMIN — PROPOFOL 20 MCG/KG/MIN: 10 INJECTION, EMULSION INTRAVENOUS at 11:38

## 2018-01-01 RX ADMIN — FAMOTIDINE 20 MG: 10 INJECTION, SOLUTION INTRAVENOUS at 08:26

## 2018-01-01 RX ADMIN — ALBUTEROL SULFATE 2.5 MG: 2.5 SOLUTION RESPIRATORY (INHALATION) at 17:50

## 2018-01-01 RX ADMIN — LORAZEPAM 2 MG: 2 INJECTION INTRAMUSCULAR; INTRAVENOUS at 13:47

## 2018-01-01 RX ADMIN — LORAZEPAM 2 MG: 2 INJECTION INTRAMUSCULAR; INTRAVENOUS at 13:40

## 2018-01-01 RX ADMIN — GLYCOPYRROLATE 0.4 MG: 0.2 INJECTION, SOLUTION INTRAMUSCULAR; INTRAVENOUS at 20:37

## 2018-01-01 RX ADMIN — METOPROLOL TARTRATE 5 MG: 1 INJECTION, SOLUTION INTRAVENOUS at 01:49

## 2018-01-01 RX ADMIN — MORPHINE SULFATE 4 MG: 4 INJECTION, SOLUTION INTRAMUSCULAR; INTRAVENOUS at 10:31

## 2018-01-01 RX ADMIN — FAMOTIDINE 20 MG: 10 INJECTION, SOLUTION INTRAVENOUS at 22:04

## 2018-01-01 RX ADMIN — IPRATROPIUM BROMIDE AND ALBUTEROL SULFATE 3 ML: 2.5; .5 SOLUTION RESPIRATORY (INHALATION) at 11:02

## 2018-01-01 RX ADMIN — GLYCOPYRROLATE 0.4 MG: 0.2 INJECTION, SOLUTION INTRAMUSCULAR; INTRAVENOUS at 09:54

## 2018-01-01 RX ADMIN — LORAZEPAM 2 MG: 2 INJECTION INTRAMUSCULAR; INTRAVENOUS at 16:54

## 2018-01-01 RX ADMIN — HYDROMORPHONE HYDROCHLORIDE 1 MG: 1 INJECTION, SOLUTION INTRAMUSCULAR; INTRAVENOUS; SUBCUTANEOUS at 02:09

## 2018-01-01 RX ADMIN — ALBUTEROL SULFATE 6 PUFF: 90 AEROSOL, METERED RESPIRATORY (INHALATION) at 10:58

## 2018-01-01 RX ADMIN — ALPRAZOLAM 0.5 MG: 0.5 TABLET ORAL at 07:55

## 2018-01-01 RX ADMIN — AMIODARONE HYDROCHLORIDE 0.5 MG/MIN: 1.8 INJECTION, SOLUTION INTRAVENOUS at 11:04

## 2018-01-01 RX ADMIN — AMIODARONE HYDROCHLORIDE 0.5 MG/MIN: 1.8 INJECTION, SOLUTION INTRAVENOUS at 10:41

## 2018-01-01 RX ADMIN — ALBUTEROL SULFATE 6 PUFF: 90 AEROSOL, METERED RESPIRATORY (INHALATION) at 03:37

## 2018-01-01 RX ADMIN — METHYLPREDNISOLONE SODIUM SUCCINATE 125 MG: 125 INJECTION, POWDER, FOR SOLUTION INTRAMUSCULAR; INTRAVENOUS at 17:55

## 2018-01-01 RX ADMIN — SCOPOLAMINE 1 PATCH: 1 PATCH, EXTENDED RELEASE TRANSDERMAL at 17:15

## 2018-01-01 RX ADMIN — FAMOTIDINE 20 MG: 10 INJECTION, SOLUTION INTRAVENOUS at 08:31

## 2018-01-01 RX ADMIN — MORPHINE SULFATE 4 MG: 4 INJECTION INTRAVENOUS at 23:42

## 2018-01-01 RX ADMIN — LORAZEPAM 2 MG: 2 INJECTION INTRAMUSCULAR; INTRAVENOUS at 00:14

## 2018-01-01 RX ADMIN — DOXYCYCLINE 100 MG: 100 INJECTION, POWDER, LYOPHILIZED, FOR SOLUTION INTRAVENOUS at 16:07

## 2018-01-01 RX ADMIN — LORAZEPAM 2 MG: 2 INJECTION INTRAMUSCULAR; INTRAVENOUS at 14:07

## 2018-01-01 RX ADMIN — DOXYCYCLINE 100 MG: 100 INJECTION, POWDER, LYOPHILIZED, FOR SOLUTION INTRAVENOUS at 14:23

## 2018-01-01 RX ADMIN — IPRATROPIUM BROMIDE AND ALBUTEROL SULFATE 6 ML: .5; 3 SOLUTION RESPIRATORY (INHALATION) at 17:06

## 2018-01-01 RX ADMIN — ALBUTEROL SULFATE 6 PUFF: 90 AEROSOL, METERED RESPIRATORY (INHALATION) at 12:47

## 2018-01-01 RX ADMIN — MORPHINE SULFATE 4 MG: 4 INJECTION, SOLUTION INTRAMUSCULAR; INTRAVENOUS at 09:20

## 2018-01-01 RX ADMIN — HYDROMORPHONE HYDROCHLORIDE 1.5 MG: 2 INJECTION INTRAMUSCULAR; INTRAVENOUS; SUBCUTANEOUS at 01:14

## 2018-01-01 RX ADMIN — IPRATROPIUM BROMIDE AND ALBUTEROL SULFATE 3 ML: 2.5; .5 SOLUTION RESPIRATORY (INHALATION) at 06:56

## 2018-01-01 RX ADMIN — CEFTRIAXONE SODIUM 1 G: 1 INJECTION, SOLUTION INTRAVENOUS at 18:25

## 2018-01-01 RX ADMIN — FENTANYL CITRATE 25 MCG: 50 INJECTION, SOLUTION INTRAMUSCULAR; INTRAVENOUS at 22:08

## 2018-01-01 RX ADMIN — LORAZEPAM 1 MG: 2 INJECTION INTRAMUSCULAR; INTRAVENOUS at 09:21

## 2018-01-01 RX ADMIN — MORPHINE SULFATE 4 MG: 4 INJECTION INTRAVENOUS at 13:47

## 2018-01-01 RX ADMIN — LORAZEPAM 2 MG: 2 INJECTION INTRAMUSCULAR; INTRAVENOUS at 17:47

## 2018-01-01 RX ADMIN — IPRATROPIUM BROMIDE AND ALBUTEROL SULFATE 3 ML: 2.5; .5 SOLUTION RESPIRATORY (INHALATION) at 23:20

## 2018-01-01 RX ADMIN — HYDROMORPHONE HYDROCHLORIDE 1 MG: 1 INJECTION, SOLUTION INTRAMUSCULAR; INTRAVENOUS; SUBCUTANEOUS at 17:47

## 2018-01-01 RX ADMIN — LORAZEPAM 1 MG: 2 INJECTION INTRAMUSCULAR; INTRAVENOUS at 20:37

## 2018-01-01 RX ADMIN — ALBUTEROL SULFATE 2.5 MG: 2.5 SOLUTION RESPIRATORY (INHALATION) at 18:05

## 2018-01-01 RX ADMIN — LORAZEPAM 2 MG: 2 INJECTION INTRAMUSCULAR; INTRAVENOUS at 04:45

## 2018-01-01 RX ADMIN — FENTANYL CITRATE 50 MCG: 50 INJECTION, SOLUTION INTRAMUSCULAR; INTRAVENOUS at 11:02

## 2018-01-01 RX ADMIN — FENTANYL CITRATE 50 MCG: 50 INJECTION, SOLUTION INTRAMUSCULAR; INTRAVENOUS at 07:37

## 2018-01-01 RX ADMIN — PHENYLEPHRINE HYDROCHLORIDE 0.5 MCG/KG/MIN: 10 INJECTION INTRAVENOUS at 11:21

## 2018-01-01 RX ADMIN — NOREPINEPHRINE BITARTRATE 0.05 MCG/KG/MIN: 8 SOLUTION at 10:35

## 2018-01-01 RX ADMIN — ACETYLCYSTEINE 4 ML: 200 SOLUTION ORAL; RESPIRATORY (INHALATION) at 07:11

## 2018-01-01 RX ADMIN — AMIODARONE HYDROCHLORIDE 150 MG: 50 INJECTION, SOLUTION INTRAVENOUS at 20:59

## 2018-01-01 RX ADMIN — DOXYCYCLINE 100 MG: 100 CAPSULE ORAL at 21:00

## 2018-01-01 RX ADMIN — IPRATROPIUM BROMIDE AND ALBUTEROL SULFATE 3 ML: 2.5; .5 SOLUTION RESPIRATORY (INHALATION) at 23:57

## 2018-01-01 RX ADMIN — ALBUTEROL SULFATE 6 PUFF: 90 AEROSOL, METERED RESPIRATORY (INHALATION) at 15:32

## 2018-01-01 RX ADMIN — CEFTRIAXONE SODIUM 1 G: 1 INJECTION, SOLUTION INTRAVENOUS at 18:18

## 2018-01-01 RX ADMIN — LORAZEPAM 2 MG: 2 INJECTION INTRAMUSCULAR; INTRAVENOUS at 02:09

## 2018-01-01 RX ADMIN — HYDROMORPHONE HYDROCHLORIDE 1 MG: 1 INJECTION, SOLUTION INTRAMUSCULAR; INTRAVENOUS; SUBCUTANEOUS at 05:40

## 2018-01-01 RX ADMIN — HYDROMORPHONE HYDROCHLORIDE 1 MG: 1 INJECTION, SOLUTION INTRAMUSCULAR; INTRAVENOUS; SUBCUTANEOUS at 17:31

## 2018-01-01 RX ADMIN — MORPHINE SULFATE 4 MG: 4 INJECTION INTRAVENOUS at 20:37

## 2018-01-01 RX ADMIN — LORAZEPAM 2 MG: 2 INJECTION INTRAMUSCULAR; INTRAVENOUS at 17:31

## 2018-01-01 RX ADMIN — METOPROLOL TARTRATE 5 MG: 1 INJECTION, SOLUTION INTRAVENOUS at 21:14

## 2018-01-01 RX ADMIN — IPRATROPIUM BROMIDE AND ALBUTEROL SULFATE 3 ML: 2.5; .5 SOLUTION RESPIRATORY (INHALATION) at 03:15

## 2018-01-01 RX ADMIN — LORAZEPAM 2 MG: 2 INJECTION INTRAMUSCULAR; INTRAVENOUS at 08:21

## 2018-01-01 RX ADMIN — MORPHINE SULFATE 4 MG: 4 INJECTION, SOLUTION INTRAMUSCULAR; INTRAVENOUS at 10:49

## 2018-01-01 RX ADMIN — LORAZEPAM 2 MG: 2 INJECTION INTRAMUSCULAR; INTRAVENOUS at 04:59

## 2018-01-01 RX ADMIN — MORPHINE SULFATE 4 MG: 4 INJECTION, SOLUTION INTRAMUSCULAR; INTRAVENOUS at 16:37

## 2018-01-01 RX ADMIN — LORAZEPAM 2 MG: 2 INJECTION INTRAMUSCULAR; INTRAVENOUS at 10:50

## 2018-01-01 RX ADMIN — CEFTRIAXONE SODIUM 1 G: 1 INJECTION, SOLUTION INTRAVENOUS at 19:37

## 2018-01-01 RX ADMIN — LORAZEPAM 2 MG: 2 INJECTION INTRAMUSCULAR; INTRAVENOUS at 23:09

## 2018-01-01 RX ADMIN — CEFTRIAXONE SODIUM 1 G: 1 INJECTION, SOLUTION INTRAVENOUS at 20:50

## 2018-01-01 RX ADMIN — LIDOCAINE HYDROCHLORIDE 0.5 MG/MIN: 4 INJECTION, SOLUTION INTRAVENOUS at 01:35

## 2018-01-01 RX ADMIN — IPRATROPIUM BROMIDE AND ALBUTEROL SULFATE 3 ML: .5; 3 SOLUTION RESPIRATORY (INHALATION) at 16:23

## 2018-01-01 RX ADMIN — PROPOFOL 30 MCG/KG/MIN: 10 INJECTION, EMULSION INTRAVENOUS at 03:20

## 2018-01-01 RX ADMIN — ENOXAPARIN SODIUM 30 MG: 30 INJECTION SUBCUTANEOUS at 06:07

## 2018-01-01 RX ADMIN — ACETYLCYSTEINE 4 ML: 200 SOLUTION ORAL; RESPIRATORY (INHALATION) at 16:23

## 2018-01-01 RX ADMIN — ALBUTEROL SULFATE 6 PUFF: 90 AEROSOL, METERED RESPIRATORY (INHALATION) at 23:16

## 2018-01-01 RX ADMIN — LORAZEPAM 1 MG: 2 INJECTION INTRAMUSCULAR; INTRAVENOUS at 04:19

## 2018-01-01 RX ADMIN — IPRATROPIUM BROMIDE AND ALBUTEROL SULFATE 3 ML: 2.5; .5 SOLUTION RESPIRATORY (INHALATION) at 07:24

## 2018-01-01 RX ADMIN — MORPHINE SULFATE 4 MG: 4 INJECTION, SOLUTION INTRAMUSCULAR; INTRAVENOUS at 18:44

## 2018-01-01 RX ADMIN — METOPROLOL TARTRATE 5 MG: 5 INJECTION, SOLUTION INTRAVENOUS at 21:14

## 2018-01-01 RX ADMIN — HYDROMORPHONE HYDROCHLORIDE 1 MG: 1 INJECTION, SOLUTION INTRAMUSCULAR; INTRAVENOUS; SUBCUTANEOUS at 13:27

## 2018-01-01 RX ADMIN — ACETYLCYSTEINE 4 ML: 200 SOLUTION ORAL; RESPIRATORY (INHALATION) at 06:56

## 2018-01-01 RX ADMIN — AMIODARONE HYDROCHLORIDE 1 MG/MIN: 1.8 INJECTION, SOLUTION INTRAVENOUS at 11:25

## 2018-01-01 RX ADMIN — LORAZEPAM 1 MG: 2 INJECTION INTRAMUSCULAR; INTRAVENOUS at 09:54

## 2018-01-01 RX ADMIN — CEFTRIAXONE SODIUM 1 G: 1 INJECTION, SOLUTION INTRAVENOUS at 18:01

## 2018-01-01 RX ADMIN — NICOTINE 1 PATCH: 21 PATCH, EXTENDED RELEASE TRANSDERMAL at 21:26

## 2018-01-01 RX ADMIN — LORAZEPAM 2 MG: 2 INJECTION INTRAMUSCULAR; INTRAVENOUS at 17:07

## 2018-01-01 RX ADMIN — LORAZEPAM 1 MG: 2 INJECTION INTRAMUSCULAR; INTRAVENOUS at 06:43

## 2018-01-01 RX ADMIN — INSULIN ASPART 2 UNITS: 100 INJECTION, SOLUTION INTRAVENOUS; SUBCUTANEOUS at 06:07

## 2018-01-01 RX ADMIN — METHYLPREDNISOLONE SODIUM SUCCINATE 40 MG: 40 INJECTION, POWDER, LYOPHILIZED, FOR SOLUTION INTRAMUSCULAR; INTRAVENOUS at 18:18

## 2018-01-01 RX ADMIN — LORAZEPAM 2 MG: 2 INJECTION INTRAMUSCULAR; INTRAVENOUS at 21:56

## 2018-01-01 RX ADMIN — Medication 3 ML: at 23:29

## 2018-01-01 RX ADMIN — ALBUTEROL SULFATE 6 PUFF: 90 AEROSOL, METERED RESPIRATORY (INHALATION) at 19:39

## 2018-01-01 RX ADMIN — AMIODARONE HYDROCHLORIDE 0.5 MG/MIN: 1.8 INJECTION, SOLUTION INTRAVENOUS at 05:07

## 2018-01-01 RX ADMIN — METHYLPREDNISOLONE SODIUM SUCCINATE 40 MG: 40 INJECTION, POWDER, LYOPHILIZED, FOR SOLUTION INTRAMUSCULAR; INTRAVENOUS at 18:25

## 2018-01-01 RX ADMIN — ALBUTEROL SULFATE 6 PUFF: 90 AEROSOL, METERED RESPIRATORY (INHALATION) at 06:59

## 2018-01-01 RX ADMIN — IPRATROPIUM BROMIDE AND ALBUTEROL SULFATE 3 ML: 2.5; .5 SOLUTION RESPIRATORY (INHALATION) at 12:12

## 2018-01-01 RX ADMIN — HYDROMORPHONE HYDROCHLORIDE 1 MG: 1 INJECTION, SOLUTION INTRAMUSCULAR; INTRAVENOUS; SUBCUTANEOUS at 20:54

## 2018-01-01 RX ADMIN — IPRATROPIUM BROMIDE AND ALBUTEROL SULFATE 3 ML: 2.5; .5 SOLUTION RESPIRATORY (INHALATION) at 08:26

## 2018-01-01 RX ADMIN — ALBUTEROL SULFATE 6 PUFF: 90 AEROSOL, METERED RESPIRATORY (INHALATION) at 11:16

## 2018-01-01 RX ADMIN — MORPHINE SULFATE 4 MG: 4 INJECTION INTRAVENOUS at 06:43

## 2018-01-01 RX ADMIN — METHYLPREDNISOLONE SODIUM SUCCINATE 40 MG: 40 INJECTION, POWDER, LYOPHILIZED, FOR SOLUTION INTRAMUSCULAR; INTRAVENOUS at 05:59

## 2018-01-01 RX ADMIN — IOPAMIDOL 95 ML: 755 INJECTION, SOLUTION INTRAVENOUS at 19:06

## 2018-01-01 RX ADMIN — DOXYCYCLINE 100 MG: 100 INJECTION, POWDER, LYOPHILIZED, FOR SOLUTION INTRAVENOUS at 07:52

## 2018-01-01 RX ADMIN — HYDROMORPHONE HYDROCHLORIDE 1 MG: 1 INJECTION, SOLUTION INTRAMUSCULAR; INTRAVENOUS; SUBCUTANEOUS at 08:56

## 2018-01-01 RX ADMIN — FENTANYL CITRATE 50 MCG: 50 INJECTION, SOLUTION INTRAMUSCULAR; INTRAVENOUS at 04:34

## 2018-01-01 RX ADMIN — LORAZEPAM 1 MG: 2 INJECTION INTRAMUSCULAR; INTRAVENOUS at 07:28

## 2018-01-01 RX ADMIN — SCOPOLAMINE 1 PATCH: 1 PATCH, EXTENDED RELEASE TRANSDERMAL at 15:52

## 2018-01-01 RX ADMIN — GLYCOPYRROLATE 0.4 MG: 0.2 INJECTION, SOLUTION INTRAMUSCULAR; INTRAVENOUS at 16:37

## 2018-01-01 RX ADMIN — LORAZEPAM 1 MG: 2 INJECTION INTRAMUSCULAR; INTRAVENOUS at 21:00

## 2018-01-01 RX ADMIN — ALBUTEROL SULFATE 2 PUFF: 90 AEROSOL, METERED RESPIRATORY (INHALATION) at 07:11

## 2018-01-01 RX ADMIN — HYDROMORPHONE HYDROCHLORIDE 1.5 MG: 2 INJECTION INTRAMUSCULAR; INTRAVENOUS; SUBCUTANEOUS at 21:56

## 2018-01-01 RX ADMIN — ADENOSINE 6 MG: 3 INJECTION, SOLUTION INTRAVENOUS at 11:04

## 2018-01-01 RX ADMIN — ALBUTEROL SULFATE 6 PUFF: 90 AEROSOL, METERED RESPIRATORY (INHALATION) at 11:43

## 2018-01-01 RX ADMIN — LORAZEPAM 0.5 MG: 2 INJECTION INTRAMUSCULAR; INTRAVENOUS at 17:21

## 2018-01-01 RX ADMIN — DOXYCYCLINE 100 MG: 100 INJECTION, POWDER, LYOPHILIZED, FOR SOLUTION INTRAVENOUS at 00:08

## 2018-01-01 RX ADMIN — HYDROMORPHONE HYDROCHLORIDE 1 MG: 1 INJECTION, SOLUTION INTRAMUSCULAR; INTRAVENOUS; SUBCUTANEOUS at 14:08

## 2018-01-01 RX ADMIN — METHYLPREDNISOLONE SODIUM SUCCINATE 40 MG: 40 INJECTION, POWDER, LYOPHILIZED, FOR SOLUTION INTRAMUSCULAR; INTRAVENOUS at 06:30

## 2018-01-01 RX ADMIN — MORPHINE SULFATE 4 MG: 4 INJECTION, SOLUTION INTRAMUSCULAR; INTRAVENOUS at 00:14

## 2018-01-01 RX ADMIN — NOREPINEPHRINE BITARTRATE 0.14 MCG/KG/MIN: 8 SOLUTION at 21:52

## 2018-01-01 RX ADMIN — DOXYCYCLINE 100 MG: 100 INJECTION, POWDER, LYOPHILIZED, FOR SOLUTION INTRAVENOUS at 00:36

## 2018-01-01 RX ADMIN — HYDROMORPHONE HYDROCHLORIDE 1.5 MG: 2 INJECTION INTRAMUSCULAR; INTRAVENOUS; SUBCUTANEOUS at 17:07

## 2018-01-01 RX ADMIN — IPRATROPIUM BROMIDE AND ALBUTEROL SULFATE 3 ML: 2.5; .5 SOLUTION RESPIRATORY (INHALATION) at 03:59

## 2018-01-01 RX ADMIN — FAMOTIDINE 20 MG: 10 INJECTION, SOLUTION INTRAVENOUS at 14:53

## 2018-01-01 RX ADMIN — GLYCOPYRROLATE 0.4 MG: 0.2 INJECTION, SOLUTION INTRAMUSCULAR; INTRAVENOUS at 17:07

## 2018-01-01 RX ADMIN — NICOTINE 1 PATCH: 21 PATCH, EXTENDED RELEASE TRANSDERMAL at 08:05

## 2018-01-01 RX ADMIN — LORAZEPAM 2 MG: 2 INJECTION INTRAMUSCULAR; INTRAVENOUS at 05:40

## 2018-01-01 RX ADMIN — MORPHINE SULFATE 4 MG: 4 INJECTION INTRAVENOUS at 12:29

## 2018-01-01 RX ADMIN — ALBUTEROL SULFATE 6 PUFF: 90 AEROSOL, METERED RESPIRATORY (INHALATION) at 14:22

## 2018-01-01 RX ADMIN — NOREPINEPHRINE BITARTRATE 0.02 MCG/KG/MIN: 8 SOLUTION at 03:17

## 2018-01-01 RX ADMIN — MORPHINE SULFATE 4 MG: 4 INJECTION, SOLUTION INTRAMUSCULAR; INTRAVENOUS at 21:15

## 2018-01-01 RX ADMIN — GLYCOPYRROLATE 0.4 MG: 0.2 INJECTION, SOLUTION INTRAMUSCULAR; INTRAVENOUS at 04:19

## 2018-01-01 RX ADMIN — FUROSEMIDE 20 MG: 10 INJECTION, SOLUTION INTRAMUSCULAR; INTRAVENOUS at 14:07

## 2018-01-01 RX ADMIN — ACETYLCYSTEINE 4 ML: 200 SOLUTION ORAL; RESPIRATORY (INHALATION) at 07:00

## 2018-01-01 RX ADMIN — IPRATROPIUM BROMIDE AND ALBUTEROL SULFATE 3 ML: 2.5; .5 SOLUTION RESPIRATORY (INHALATION) at 14:43

## 2018-01-01 RX ADMIN — ENOXAPARIN SODIUM 30 MG: 30 INJECTION SUBCUTANEOUS at 05:07

## 2018-01-01 RX ADMIN — HYDROMORPHONE HYDROCHLORIDE 1.5 MG: 2 INJECTION INTRAMUSCULAR; INTRAVENOUS; SUBCUTANEOUS at 08:21

## 2018-01-01 RX ADMIN — METHYLPREDNISOLONE SODIUM SUCCINATE 60 MG: 125 INJECTION, POWDER, FOR SOLUTION INTRAMUSCULAR; INTRAVENOUS at 23:44

## 2018-01-01 RX ADMIN — AMIODARONE HYDROCHLORIDE 150 MG: 50 INJECTION, SOLUTION INTRAVENOUS at 20:34

## 2018-01-01 RX ADMIN — FENTANYL CITRATE 50 MCG: 50 INJECTION, SOLUTION INTRAMUSCULAR; INTRAVENOUS at 10:30

## 2018-01-01 RX ADMIN — HYDROMORPHONE HYDROCHLORIDE 1.5 MG: 2 INJECTION INTRAMUSCULAR; INTRAVENOUS; SUBCUTANEOUS at 04:59

## 2018-01-01 RX ADMIN — DOXYCYCLINE 100 MG: 100 INJECTION, POWDER, LYOPHILIZED, FOR SOLUTION INTRAVENOUS at 01:46

## 2018-01-01 RX ADMIN — ACETYLCYSTEINE 4 ML: 200 SOLUTION ORAL; RESPIRATORY (INHALATION) at 07:22

## 2018-01-01 RX ADMIN — LORAZEPAM 2 MG: 2 INJECTION INTRAMUSCULAR; INTRAVENOUS at 17:54

## 2018-01-01 RX ADMIN — DOXYCYCLINE 100 MG: 100 INJECTION, POWDER, LYOPHILIZED, FOR SOLUTION INTRAVENOUS at 08:16

## 2018-01-01 RX ADMIN — ALBUTEROL SULFATE 6 PUFF: 90 AEROSOL, METERED RESPIRATORY (INHALATION) at 19:59

## 2018-01-01 RX ADMIN — METHYLPREDNISOLONE SODIUM SUCCINATE 40 MG: 40 INJECTION, POWDER, LYOPHILIZED, FOR SOLUTION INTRAMUSCULAR; INTRAVENOUS at 05:42

## 2018-01-01 RX ADMIN — METHYLPREDNISOLONE SODIUM SUCCINATE 40 MG: 40 INJECTION, POWDER, LYOPHILIZED, FOR SOLUTION INTRAMUSCULAR; INTRAVENOUS at 19:08

## 2018-01-01 RX ADMIN — CEFTRIAXONE SODIUM 2 G: 2 INJECTION, SOLUTION INTRAVENOUS at 19:29

## 2018-01-01 RX ADMIN — METHYLPREDNISOLONE SODIUM SUCCINATE 40 MG: 40 INJECTION, POWDER, LYOPHILIZED, FOR SOLUTION INTRAMUSCULAR; INTRAVENOUS at 18:01

## 2018-01-01 RX ADMIN — HYDROMORPHONE HYDROCHLORIDE 1 MG: 1 INJECTION, SOLUTION INTRAMUSCULAR; INTRAVENOUS; SUBCUTANEOUS at 04:45

## 2018-01-01 RX ADMIN — LORAZEPAM 2 MG: 2 INJECTION INTRAMUSCULAR; INTRAVENOUS at 20:54

## 2018-01-01 RX ADMIN — MORPHINE SULFATE 4 MG: 4 INJECTION INTRAVENOUS at 04:19

## 2018-10-08 PROBLEM — Z72.0 TOBACCO ABUSE: Status: ACTIVE | Noted: 2018-01-01

## 2018-10-08 PROBLEM — J96.01 ACUTE HYPOXEMIC RESPIRATORY FAILURE (HCC): Status: ACTIVE | Noted: 2018-01-01

## 2018-10-08 PROBLEM — J44.1 COPD EXACERBATION (HCC): Status: ACTIVE | Noted: 2018-01-01

## 2018-10-08 PROBLEM — J18.9 PNEUMONIA: Status: ACTIVE | Noted: 2018-01-01

## 2018-10-08 NOTE — PROGRESS NOTES
Clinical Pharmacy Services: Medication History    Parth Woody is a 78 y.o. male presenting to [facility] for   Chief Complaint   Patient presents with   • Shortness of Breath       He  has a past medical history of COPD (chronic obstructive pulmonary disease) (CMS/[facility]).    Allergies as of 10/08/2018   • (No Known Allergies)       Medication information was obtained from: Patient  Pharmacy and Phone Number: [facility] [phone]    Prior to Admission Medications     Prescriptions Last Dose Informant Patient Reported? Taking?    acetaminophen (TYLENOL) 500 MG tablet  Self Yes Yes    Take 500 mg by mouth Every 6 (Six) Hours As Needed for Mild Pain .    diphenhydrAMINE (BENADRYL) 25 mg capsule  Self Yes Yes    Take 25 mg by mouth Every Morning.            Medication notes: Removed, therapy complete:  Keflex, Norco, Zofran    Added: Tylenol and Benadryl    Per patient    This medication list is complete to the best of my knowledge as of 10/8/2018    Please call if questions.    Leena Nagel, Medication History Technician  10/8/2018 7:55 PM

## 2018-10-08 NOTE — ED PROVIDER NOTES
EMERGENCY DEPARTMENT ENCOUNTER    CHIEF COMPLAINT  Chief Complaint: Dyspnea  History given by: pt and spouse  History limited by: none  Room Number: 32/32  PMD: Jaime Chung MD      HPI:  Pt is a 78 y.o. male who presents complaining of SOB and weakness that has worsened over three months. Pt ha a fever, productive cough; thick mucus. Pt has h/o of COPD, but is not on O2. Pt is an active smoker, for a long time (since the age of 18). Pt's cough now is worse than usual. Pt does not take any breathing treatments, or steroids. Pt denies vomiting, but did have moderate amount of diarrhea this morning. Pt does state he is having some chest discomfort in the lower portion of his chest when he coughs, also worsening over the last three months.  The pain is sharp and occurs with coughing.  Pt is on 02 at presentation and states it helps. O2 SAT was 82% when he arrived on room air. Informed pt that he is going to be admitted.     Duration:  3 months   Onset: gradual   Timing: constant   Intensity/Severity: moderate   Progression: worsened   Associated Symptoms: productive cough, chest discomfort.   Aggravating Factors: exertion   Alleviating Factors: none   Previous Episodes: 3 months of symptoms   Treatment before arrival: none    PAST MEDICAL HISTORY  Active Ambulatory Problems     Diagnosis Date Noted   • No Active Ambulatory Problems     Resolved Ambulatory Problems     Diagnosis Date Noted   • No Resolved Ambulatory Problems     Past Medical History:   Diagnosis Date   • COPD (chronic obstructive pulmonary disease) (CMS/[facility])        PAST SURGICAL HISTORY  Past Surgical History:   Procedure Laterality Date   • CERVICAL SPINE SURGERY     • PROSTATECTOMY         FAMILY HISTORY  History reviewed. No pertinent family history.    SOCIAL HISTORY  Social History     Social History   • Marital status:      Spouse name: N/A   • Number of children: N/A   • Years of education: N/A     Occupational History   • Not on file.      Social History Main Topics   • Smoking status: Current Every Day Smoker     Packs/day: 1.00     Types: Cigarettes   • Smokeless tobacco: Not on file   • Alcohol use Yes      Comment: regular   • Drug use: No   • Sexual activity: Not on file     Other Topics Concern   • Not on file     Social History Narrative   • No narrative on file       ALLERGIES  Patient has no known allergies.    REVIEW OF SYSTEMS  Review of Systems   Constitutional: Positive for fever. Negative for activity change and appetite change.   HENT: Negative for congestion and sore throat.    Eyes: Negative.    Respiratory: Positive for cough and shortness of breath.    Cardiovascular: Positive for chest pain. Negative for leg swelling.   Gastrointestinal: Negative for abdominal pain, diarrhea and vomiting.   Endocrine: Negative.    Genitourinary: Negative for decreased urine volume and dysuria.   Musculoskeletal: Negative for neck pain.   Skin: Negative for rash and wound.   Allergic/Immunologic: Negative.    Neurological: Positive for weakness. Negative for numbness and headaches.   Hematological: Negative.    Psychiatric/Behavioral: Negative.    All other systems reviewed and are negative.      PHYSICAL EXAM  ED Triage Vitals   Temp Heart Rate Resp BP SpO2   10/08/18 1700 10/08/18 1650 10/08/18 1650 10/08/18 1700 10/08/18 1650   (!) 101.1 °F (38.4 °C) 116 24 104/68 (!) 82 %      Temp src Heart Rate Source Patient Position BP Location FiO2 (%)   10/08/18 1700 10/08/18 1650 -- -- --   Tympanic Monitor          Physical Exam   Constitutional: He is oriented to person, place, and time. He has a sickly appearance. He appears distressed.   Frail, diffuse muscle wasting.    HENT:   Head: Normocephalic and atraumatic.   Mouth/Throat: No oropharyngeal exudate.   She has some mild dry oral mucosa.   Eyes: Pupils are equal, round, and reactive to light. EOM are normal. Right eye exhibits no discharge.   Neck: Normal range of motion. Neck supple. No  tracheal deviation present.   Cardiovascular: Normal rate, regular rhythm, normal heart sounds and intact distal pulses.    No murmur heard.  Pulmonary/Chest: No stridor. He is in respiratory distress (acute ). He has wheezes (mild expiratory). He has rales ( has mild rales in the bases bilaterally left greater than right). He exhibits no tenderness.   2.5 Liters O2, 96% SAT on room air. Barrel chest consistent with advanced COPD. Decreased air movement.  Kern has a cough on exam that is pretty prominent that is dry.   Abdominal: Soft. Bowel sounds are normal. He exhibits no distension. There is no tenderness. There is no rebound and no guarding.   Musculoskeletal: Normal range of motion. He exhibits no edema.   Neurological: He is alert and oriented to person, place, and time. He has normal sensation and normal strength. No cranial nerve deficit. GCS score is 15.   Skin: Skin is warm and dry.   Psychiatric: Mood and affect normal.   Nursing note and vitals reviewed.      LAB RESULTS  Lab Results (last 24 hours)     Procedure Component Value Units Date/Time    CBC & Differential [[id number]] Collected:  10/08/18 1701    Specimen:  Blood Updated:  10/08/18 1721    Narrative:       The following orders were created for panel order CBC & Differential.  Procedure                               Abnormality         Status                     ---------                               -----------         ------                     CBC Auto Differential[[id number]]         Abnormal            Final result                 Please view results for these tests on the individual orders.    Comprehensive Metabolic Panel [[id number]]  (Abnormal) Collected:  10/08/18 1701    Specimen:  Blood Updated:  10/08/18 1742     Glucose 127 (H) mg/dL      BUN 15 mg/dL      Creatinine 0.77 mg/dL      Sodium 133 (L) mmol/L      Potassium 4.0 mmol/L      Chloride 95 (L) mmol/L      CO2 24.3 mmol/L      Calcium 8.5 (L) mg/dL      Total Protein 7.1  g/dL      Albumin 3.80 g/dL      ALT (SGPT) 21 U/L      AST (SGOT) 39 U/L      Alkaline Phosphatase 70 U/L      Total Bilirubin 0.2 mg/dL      eGFR Non African Amer 98 mL/min/1.73      Globulin 3.3 gm/dL      A/G Ratio 1.2 g/dL      BUN/Creatinine Ratio 19.5     Anion Gap 13.7 mmol/L     Narrative:       The MDRD GFR formula is only valid for adults with stable renal function between ages 18 and 70.    BNP [[id number]]  (Normal) Collected:  10/08/18 1701    Specimen:  Blood Updated:  10/08/18 1741     proBNP 536.5 pg/mL     Narrative:       Among patients with dyspnea, NT-proBNP is highly sensitive for the detection of acute congestive heart failure. In addition NT-proBNP of <300 pg/ml effectively rules out acute congestive heart failure with 99% negative predictive value.    Troponin [[id number]]  (Normal) Collected:  10/08/18 1701    Specimen:  Blood Updated:  10/08/18 1742     Troponin T <0.010 ng/mL     Narrative:       Troponin T Reference Ranges:  Less than 0.03 ng/mL:    Negative for AMI  0.03 to 0.09 ng/mL:      Indeterminant for AMI  Greater than 0.09 ng/mL: Positive for AMI    CBC Auto Differential [[id number]]  (Abnormal) Collected:  10/08/18 1701    Specimen:  Blood Updated:  10/08/18 1721     WBC 12.65 (H) 10*3/mm3      RBC 4.99 10*6/mm3      Hemoglobin 14.2 g/dL      Hematocrit 44.7 %      MCV 89.6 fL      MCH 28.5 pg      MCHC 31.8 (L) g/dL      RDW 14.7 (H) %      RDW-SD 47.8 fl      MPV 10.7 fL      Platelets 196 10*3/mm3      Neutrophil % 86.4 (H) %      Lymphocyte % 4.3 (L) %      Monocyte % 9.0 %      Eosinophil % 0.0 (L) %      Basophil % 0.1 %      Immature Grans % 0.2 %      Neutrophils, Absolute 10.94 (H) 10*3/mm3      Lymphocytes, Absolute 0.54 (L) 10*3/mm3      Monocytes, Absolute 1.14 10*3/mm3      Eosinophils, Absolute 0.00 10*3/mm3      Basophils, Absolute 0.01 10*3/mm3      Immature Grans, Absolute 0.02 10*3/mm3     D-dimer, Quantitative [[id number]]  (Abnormal) Collected:  10/08/18 4059     "Specimen:  Blood Updated:  10/08/18 1849     D-Dimer, Quantitative 1.20 (H) MCGFEU/mL     Narrative:       The Stago D-Dimer test used in conjunction with a clinical pretest probability (PTP) assessment model, has been approved by the FDA to rule out the presence of venous thromboembolism (VTE) in outpatients suspected of deep venous thrombosis (DVT) or pulmonary embolism (PE).     Magnesium [[id number]]  (Normal) Collected:  10/08/18 1701    Specimen:  Blood Updated:  10/08/18 1807     Magnesium 1.9 mg/dL     Procalcitonin [[id number]]  (Abnormal) Collected:  10/08/18 1701    Specimen:  Blood Updated:  10/08/18 1840     Procalcitonin 1.12 (C) ng/mL     Narrative:       As a Marker for Sepsis (Non-Neonates):   1. <0.5 ng/mL represents a low risk of severe sepsis and/or septic shock.  1. >2 ng/mL represents a high risk of severe sepsis and/or septic shock.    As a Marker for Lower Respiratory Tract Infections that require antibiotic therapy:  PCT on Admission     Antibiotic Therapy             6-12 Hrs later  > 0.5                Strongly Recommended            >0.25 - <0.5         Recommended  0.1 - 0.25           Discouraged                   Remeasure/reassess PCT  <0.1                 Strongly Discouraged          Remeasure/reassess PCT      As 28 day mortality risk marker: \"Change in Procalcitonin Result\" (> 80 % or <=80 %) if Day 0 (or Day 1) and Day 4 values are available. Refer to http://www.[employer]-pct-calculator.com/   Change in PCT <=80 %   A decrease of PCT levels below or equal to 80 % defines a positive change in PCT test result representing a higher risk for 28-day all-cause mortality of patients diagnosed with severe sepsis or septic shock.  Change in PCT > 80 %   A decrease of PCT levels of more than 80 % defines a negative change in PCT result representing a lower risk for 28-day all-cause mortality of patients diagnosed with severe sepsis or septic shock.                Lactic Acid, Plasma [[id number]]  " (Normal) Collected:  10/08/18 1709    Specimen:  Blood Updated:  10/08/18 1732     Lactate 1.8 mmol/L     Blood Culture - Blood, [[id number]] Collected:  10/08/18 1709    Specimen:  Blood from Arm, Left Updated:  10/08/18 1713    Blood Gas, Arterial [[id number]]  (Abnormal) Collected:  10/08/18 1812    Specimen:  Arterial Blood Updated:  10/08/18 1814     Site Arterial: right radial     Richi's Test Positive     pH, Arterial 7.429 pH units      pCO2, Arterial 37.2 mm Hg      pO2, Arterial 77.8 (L) mm Hg      HCO3, Arterial 24.6 mmol/L      Base Excess, Arterial 0.5 mmol/L      O2 Saturation Calculated 95.8 %      Barometric Pressure for Blood Gas 755.0 mmHg      Modality Cannula     Flow Rate 2.5 lpm      Rate 26 Breaths/minute     Narrative:       ghulam 95 Meter: [id number] : [id number] Qi Santana    Blood Culture - Blood, [[id number]] Collected:  10/08/18 1849    Specimen:  Blood from Arm, Right Updated:  10/08/18 1853          I ordered the above labs and reviewed the results    RADIOLOGY  XR Chest 1 View   Final Result      CT Angiogram Chest With Contrast    (Results Pending)     CXR:  The lungs are markedly hyperinflated with diminished markings in the  upper lung zones consistent with severe emphysema. There is patchy  infiltrate at the left lung base consistent with pneumonia. No pleural  effusions are identified. The heart is normal in size.    I ordered the above noted radiological studies. Interpreted by radiologist. Discussed with radiologist (). Reviewed by me in PACS.       PROCEDURES  Critical Care  Performed by: ORQUIDEA MCMILLAN  Authorized by: ORQUIDEA MCMILLAN     Critical care provider statement:     Critical care time (minutes):  30    Critical care was necessary to treat or prevent imminent or life-threatening deterioration of the following conditions:  Respiratory failure    Critical care was time spent personally by me on the following activities:  Discussions with consultants and ordering and  performing treatments and interventions        EKG           EKG time: 1719  Rhythm/Rate: 90 Sinus   P waves and FL: normal  QRS, axis: narrow QRS   ST and T waves: Diffuse ST and T wave changes, Normal QT     Interpreted Contemporaneously by me, independently viewed  unchanged compared to prior 7/2010      PROGRESS AND CONSULTS  ED Course as of Oct 08 1928   Mon Oct 08, 2018   1658 Progressive dyspnea with productive cough x 2 week, much worse today. + COPD. No pulmonologist, not on O2 at home.  [KA]      ED Course User Index  [KA] Mallory Pierson PA       1748 Ordered Tylenol, albuterol treatments   1834 Rechecked pt, pt is breathing better. )2 SAT is 94%, Informed pt of CXR results: emphysema and pneumonia present. Discussed plan for admission, pt understands and agrees with the plan.   1854 Ordered CTA Chest  1857 Spoke to Dr. Masters ([facility]) who agrees to admit the pt.   1928 Spoke to Dr. Hernandez about CTA chest results; LLL pneumonia present, no PE.   7:50 PM  I informed patient of the results of the CT scan and the patient is doing fine in stable.  He has no complaints currently is on 2.5 L of oxygen his O2 sat is 96%.  His blood pressure and heart rate are normal.  Patient is good and stable for admission to a monitor bed.        MEDICAL DECISION MAKING  Results were reviewed/discussed with the patient and they were also made aware of online access. Pt also made aware that some labs, such as cultures, will not be resulted during ER visit and follow up with PMD is necessary.     MDM  Number of Diagnoses or Management Options  Acute respiratory failure with hypoxia (CMS/HCC):   Community acquired pneumonia of left lower lobe of lung (CMS/HCC):   COPD exacerbation (CMS/HCC):      Amount and/or Complexity of Data Reviewed  Clinical lab tests: ordered and reviewed (Dimer: 1.2 )  Tests in the radiology section of CPT®: ordered and reviewed (CXR:    The lungs are markedly hyperinflated with diminished markings in  the  upper lung zones consistent with severe emphysema. There is patchy  infiltrate at the left lung base consistent with pneumonia. No pleural  effusions are identified. The heart is normal in size.)  Tests in the medicine section of CPT®: ordered and reviewed (See EKG procedure note )  Discuss the patient with other providers: yes (Dr. Masters ([facility]))  Independent visualization of images, tracings, or specimens: yes           DIAGNOSIS  Final diagnoses:   COPD exacerbation (CMS/HCC)   Acute respiratory failure with hypoxia (CMS/HCC)   Community acquired pneumonia of left lower lobe of lung (CMS/HCC)       DISPOSITION  ADMISSION    Discussed treatment plan and reason for admission with pt/family and admitting physician.  Pt/family voiced understanding of the plan for admission for further testing/treatment as needed.         Latest Documented Vital Signs:  As of 7:28 PM  BP- 118/65 HR- 91 Temp- (!) 101.1 °F (38.4 °C) (Tympanic) O2 sat- 93%    --  Documentation assistance provided by lizeth Murray for Dr. Angulo.  Information recorded by the scribe was done at my direction and has been verified and validated by me.     Lina Murray  10/08/18 1749       Lina Murray  10/08/18 1908       Lina Murray  10/08/18 1928       Patrick Angulo MD  10/08/18 1951

## 2018-10-08 NOTE — ED NOTES
Attempted to call report to floor. Nurse unavailable. Will try again in a few minutes.     Jeanette Almeida, RN  10/08/18 1799

## 2018-10-09 PROBLEM — E43 SEVERE MALNUTRITION (HCC): Status: ACTIVE | Noted: 2018-01-01

## 2018-10-09 PROBLEM — R63.6 UNDERWEIGHT: Status: ACTIVE | Noted: 2018-01-01

## 2018-10-09 NOTE — PROGRESS NOTES
Discharge Planning Assessment  [facility]     Patient Name: Parth Woody  MRN: [id number]  Today's Date: 10/9/2018    Admit Date: 10/8/2018          Discharge Needs Assessment     Row Name 10/09/18 0842       Living Environment    Lives With child(nereida), adult;spouse    Name(s) of Who Lives With Patient Spouse  ( Cj Woody) and Son  (Priyank Woody)    Current Living Arrangements home/apartment/condo    Primary Care Provided by self    Provides Primary Care For no one    Family Caregiver if Needed spouse    Family Caregiver Names Spouse ( Cj Woody)    Able to Return to Prior Arrangements yes    Living Arrangement Comments Pt lives in a single story house with wife  (Cj Woody) and adult son  (Priyank)       Resource/Environmental Concerns    Resource/Environmental Concerns none    Transportation Concerns car, none       Transition Planning    Patient/Family Anticipates Transition to home with family    Patient/Family Anticipated Services at Transition none    Transportation Anticipated car, drives self;family or friend will provide       Discharge Needs Assessment    Readmission Within the Last 30 Days no previous admission in last 30 days    Equipment Currently Used at Home none    Anticipated Changes Related to Illness none    Equipment Needed After Discharge none    Offered/Gave Vendor List no            Discharge Plan     Row Name 10/09/18 0844       Plan    Plan Plan home.  ANALY Jane    Patient/Family in Agreement with Plan yes    Plan Comments FACE SHEET VERIFIED/ IM LETTER SIGNED.  [facility] pt at [facility].  Pt's PCP is Dr. Jaime Chung.  Pt lives in a single story house with wife  (Cj Woody [phone]) and son  (Priyank Woody).  Pt is independent with ADL's.  Pt gets prescriptions at [facility].  Pt denies any issues affording medications.  Pt is not current with HH.  Pt has not been in SNF.   Pt denies any discharge needs. Plan home.   Lucinda RN         Destination     No service coordination in this encounter.      Durable Medical Equipment     No service coordination in this encounter.      Dialysis/Infusion     No service coordination in this encounter.      Home Medical Care     No service coordination in this encounter.      Social Care     No service coordination in this encounter.                Demographic Summary     Row Name 10/09/18 0842       General Information    Admission Type inpatient    Arrived From emergency department    Required Notices Provided Important Message from Medicare    Referral Source admission list    Reason for Consult discharge planning    Preferred Language English     Used During This Interaction no            Functional Status     Row Name 10/09/18 0842       Functional Status    Usual Activity Tolerance good    Current Activity Tolerance moderate       Functional Status, IADL    Medications independent    Meal Preparation independent    Housekeeping independent    Laundry independent    Shopping independent       Mental Status    General Appearance WDL WDL       Mental Status Summary    Recent Changes in Mental Status/Cognitive Functioning no changes            Psychosocial    No documentation.           Abuse/Neglect    No documentation.           Legal    No documentation.           Substance Abuse    No documentation.           Patient Forms    No documentation.         Nona Downey, RN

## 2018-10-09 NOTE — H&P
[facility] Admission H&P    Patient Care Team:  Jaime Chung MD as PCP - General  Jaime Chung MD as PCP - Family Medicine  Loveless, Gerardo Cardoza MD as PCP - Claims Attributed    Chief complaint: Shortness of breath, cough    History of Present Illness    This is a 78-year-old now with history of COPD and tobacco abuse who takes no medications at home and does not go to the doctor.  He presented to the emergency room with a 3 month history of shortness of breath that he states has become dramatically worse over the past 2 days.  He has developed an associated productive cough of yellowish sputum.  He had a low-grade fever upon presentation.  He states that his shortness of breath is constant.  He denies any chest pain or pressure.  He denies any nausea or vomiting.  He smokes 1 pack per day.  Workup in the emergency room revealed a left lower lobe pneumonia.    His only other complaint was a brief episode of loose stools this morning which has resolved.  He denies abdominal pain.    Past Medical History:   Diagnosis Date   • COPD (chronic obstructive pulmonary disease) (CMS/[facility])      Past Surgical History:   Procedure Laterality Date   • CERVICAL SPINE SURGERY     • PROSTATECTOMY       History reviewed. No pertinent family history.  Social History   Substance Use Topics   • Smoking status: Current Every Day Smoker     Packs/day: 1.00     Types: Cigarettes   • Smokeless tobacco: Not on file   • Alcohol use Yes      Comment: regular     Medications reviewed    Allergies:  Patient has no known allergies.    Review of Systems   Constitutional: Positive for fever. Negative for chills.   HENT: Negative for congestion and sore throat.    Eyes: Negative for visual disturbance.   Respiratory: Positive for cough and shortness of breath. Negative for chest tightness and wheezing.    Cardiovascular: Negative for chest pain, palpitations and leg swelling.   Gastrointestinal: Positive for diarrhea. Negative for abdominal  distention, abdominal pain, nausea and vomiting.   Endocrine: Negative for polydipsia and polyuria.   Genitourinary: Negative for difficulty urinating, dysuria, frequency and urgency.   Musculoskeletal: Negative for arthralgias and myalgias.   Skin: Negative for color change and rash.   Neurological: Negative for dizziness and light-headedness.        PHYSICAL EXAM    Vital Signs  tMax 24 hrs:  Temp (24hrs), Av.7 °F (37.6 °C), Min:98.3 °F (36.8 °C), Max:101.1 °F (38.4 °C)    Vitals Ranges:  Temp:  [98.3 °F (36.8 °C)-101.1 °F (38.4 °C)] 98.3 °F (36.8 °C)  Heart Rate:  [] 80  Resp:  [22-24] 22  BP: (104-118)/(65-83) 106/68    Physical Exam   Constitutional: He is oriented to person, place, and time. He appears well-developed and well-nourished.   Cachectic appearing   HENT:   Head: Normocephalic and atraumatic.   Eyes: Pupils are equal, round, and reactive to light. EOM are normal.   Neck: Neck supple. No tracheal deviation present.   Cardiovascular: Normal rate and regular rhythm.  Exam reveals no gallop.    No murmur heard.  Pulmonary/Chest: No respiratory distress. He has no wheezes.   He has diminished breath sounds with mild rhonchi at the left base.  He has increased work of breathing.  I do not appreciate any wheezing at the moment   Abdominal: Soft. Bowel sounds are normal. He exhibits no distension. There is no tenderness.   Musculoskeletal: He exhibits no edema or tenderness.   Neurological: He is alert and oriented to person, place, and time. No cranial nerve deficit.   Skin: Skin is warm and dry.   Nursing note and vitals reviewed.      Results Review:    Results from last 7 days  Lab Units 10/08/18  1701   WBC 10*3/mm3 12.65*   HEMOGLOBIN g/dL 14.2   HEMATOCRIT % 44.7   PLATELETS 10*3/mm3 196       Results from last 7 days  Lab Units 10/08/18  1701   SODIUM mmol/L 133*   POTASSIUM mmol/L 4.0   CHLORIDE mmol/L 95*   CO2 mmol/L 24.3   BUN mg/dL 15   CREATININE mg/dL 0.77   CALCIUM mg/dL 8.5*    BILIRUBIN mg/dL 0.2   ALK PHOS U/L 70   ALT (SGPT) U/L 21   AST (SGOT) U/L 39   GLUCOSE mg/dL 127*     CT angiogram of the chest:  1. No pulmonary embolism.  2. Left lower lobe pneumonia, follow-up recommended. Extensive  emphysematous and chronic changes of the lungs.    CXR:  The lungs are markedly hyperinflated with diminished markings in the  upper lung zones consistent with severe emphysema. There is patchy  infiltrate at the left lung base consistent with pneumonia. No pleural  effusions are identified. The heart is normal in size.     I reviewed the patient's new clinical results.  I reviewed the patient's new imaging results and agree with the interpretation.  I personally viewed and interpreted the patient's EKG/Telemetry data        Active Hospital Problems    Diagnosis Date Noted   • **Pneumonia [J18.9] 10/08/2018   • COPD exacerbation (CMS/[facility]) [J44.1] 10/08/2018   • Tobacco abuse [Z72.0] 10/08/2018   • Acute hypoxemic respiratory failure (CMS/[facility]) [J96.01] 10/08/2018      Resolved Hospital Problems    Diagnosis Date Noted Date Resolved   No resolved problems to display.       Assessment & Plan    The patient will be admitted.  I will start him on Rocephin and doxycycline for treatment of community-acquired pneumonia.  Check sputum culture, urine antigens, and respiratory viral panel.  He appears to have pretty advanced COPD.  I will start bronchodilators and ask pulmonology to see him.  We will hold off on steroids for now.  We will wean his oxygen for saturations between 88 and 92%.  I counseled him on smoking cessation and we'll provide him a nicotine patch.  Additional plans based on his clinical course.    I discussed the patients findings and my recommendations with patient    Dino Masters MD  10/08/18  8:47 PM

## 2018-10-09 NOTE — PLAN OF CARE
Problem: Patient Care Overview  Goal: Plan of Care Review  Outcome: Ongoing (interventions implemented as appropriate)   10/09/18 1130   Coping/Psychosocial   Plan of Care Reviewed With patient   OTHER   Outcome Summary Pt arrived from ED on 3L NC with c/o SOB, rr 24. Once pt transferred to bed and got settled, SOB inproved. Pt maintained on 4L NC. Urine, resp and sputum cx collected. Pulm consult notified. Pt denies any distress or discomfort. Will cont to monitor. ANALY Downs

## 2018-10-09 NOTE — PROGRESS NOTES
Continued Stay Note  [facility]     Patient Name: Parth Woody  MRN: [id number]  Today's Date: 10/9/2018    Admit Date: 10/8/2018          Discharge Plan     Row Name 10/09/18 0844       Plan    Plan Plan home.  ANALY Jane    Patient/Family in Agreement with Plan yes    Plan Comments FACE SHEET VERIFIED/ IM LETTER SIGNED.  Spoke to pt at beside.  Pt's PCP is Dr. Jaime Chung.  Pt lives in a single story house with wife  (Cj Woody [phone]) and son  (Priyank Woody).  Pt is independent with ADL's.  Pt gets prescriptions at [facility] in [facility].  Pt denies any issues affording medications.  Pt is not current with HH.  Pt has not been in SNF.   Pt denies any discharge needs. Plan home.   ANALY Jane              Discharge Codes    No documentation.           Nona Downey RN

## 2018-10-09 NOTE — PLAN OF CARE
Problem: Nutrition, Imbalanced: Inadequate Oral Intake (Adult)  Intervention: Promote/Optimize Nutrition   10/09/18 1351   Nutrition Interventions   Oral Nutrition Promotion calorie dense liquids provided;nutritional therapy counseling provided; MSA completed for severe malnutrition; encouraged PO intake; added Ensure Enlive BID

## 2018-10-09 NOTE — THERAPY EVALUATION
Acute Care - Physical Therapy Initial Evaluation  [facility]     Patient Name: Parth Woody  : 1940  MRN: [id number]  Today's Date: 10/9/2018   Onset of Illness/Injury or Date of Surgery: 10/08/18            Admit Date: 10/8/2018    Visit Dx:     ICD-10-CM ICD-9-CM   1. COPD exacerbation (CMS/[facility]) J44.1 491.21   2. Acute respiratory failure with hypoxia (CMS/[facility]) J96.01 518.81   3. Community acquired pneumonia of left lower lobe of lung (CMS/[facility]) J18.1 481     Patient Active Problem List   Diagnosis   • COPD exacerbation (CMS/[facility])   • Pneumonia   • Tobacco abuse   • Acute hypoxemic respiratory failure (CMS/[facility])   • Underweight BMI 16     Past Medical History:   Diagnosis Date   • COPD (chronic obstructive pulmonary disease) (CMS/[facility])      Past Surgical History:   Procedure Laterality Date   • CERVICAL SPINE SURGERY     • PROSTATECTOMY          PT ASSESSMENT (last 12 hours)      Physical Therapy Evaluation     Row Name 10/09/18 1111          PT Evaluation Time/Intention    Subjective Information complains of;dyspnea  -AR     Document Type evaluation  -AR     Mode of Treatment physical therapy  -AR     Patient Effort good  -AR     Symptoms Noted During/After Treatment none  -AR     Row Name 10/09/18 1111          General Information    Patient Profile Reviewed? yes  -AR     Onset of Illness/Injury or Date of Surgery 10/08/18  -AR     Prior Level of Function independent:   room air, assist w/ groceries 2/2 SOB  -AR     Equipment Currently Used at Home none  -AR     Pertinent History of Current Functional Problem pneumonia, COPD, +smoker  -AR     Existing Precautions/Restrictions fall;oxygen therapy device and L/min  -AR     Barriers to Rehab none identified  -AR     Row Name 10/09/18 1111          Relationship/Environment    Lives With spouse  -AR     Row Name 10/09/18 1111          Resource/Environmental Concerns    Current Living Arrangements home/apartment/condo  -AR     Row Name 10/09/18 1111           Cognitive Assessment/Intervention- PT/OT    Orientation Status (Cognition) oriented x 4  -AR     Follows Commands (Cognition) WNL  -AR     Row Name 10/09/18 1111          Bed Mobility Assessment/Treatment    Bed Mobility Assessment/Treatment supine-sit;sit-supine  -AR     Supine-Sit Dougherty (Bed Mobility) supervision  -AR     Sit-Supine Dougherty (Bed Mobility) not tested  -AR     Assistive Device (Bed Mobility) bed rails;head of bed elevated  -AR     Row Name 10/09/18 1111          Transfer Assessment/Treatment    Transfer Assessment/Treatment sit-stand transfer;stand-sit transfer  -AR     Sit-Stand Dougherty (Transfers) contact guard  -AR     Stand-Sit Dougherty (Transfers) contact guard  -AR     Row Name 10/09/18 1111          Gait/Stairs Assessment/Training    Dougherty Level (Gait) contact guard  -AR     Assistive Device (Gait) --   single UE support on IV pole  -AR     Distance in Feet (Gait) 60  -AR     Pattern (Gait) swing-through  -AR     Deviations/Abnormal Patterns (Gait) festinating/shuffling;gait speed decreased  -AR     Comment (Gait/Stairs) 1 standing rest break, de leon ited by dyspnea  -AR     Row Name 10/09/18 1111          General ROM    GENERAL ROM COMMENTS B LE WFL  -AR     Row Name 10/09/18 1111          MMT (Manual Muscle Testing)    General MMT Comments B LE 4/5  -AR     Row Name 10/09/18 1111          Pain Assessment    Additional Documentation Pain Scale: Numbers Pre/Post-Treatment (Group)  -AR     Row Name 10/09/18 1111          Pain Scale: Numbers Pre/Post-Treatment    Pain Scale: Numbers, Pretreatment 0/10 - no pain  -AR     Pain Scale: Numbers, Post-Treatment 0/10 - no pain  -AR     Row Name 10/09/18 1111          Physical Therapy Clinical Impression    Patient/Family Goals Statement (PT Clinical Impression) DC home  -AR     Criteria for Skilled Interventions Met (PT Clinical Impression) yes  -AR     Pathology/Pathophysiology Noted (Describe Specifically for Each System)  musculoskeletal;pulmonary  -AR     Impairments Found (describe specific impairments) aerobic capacity/endurance;gait, locomotion, and balance  -AR     Rehab Potential (PT Clinical Summary) good, to achieve stated therapy goals  -AR     Row Name 10/09/18 1111          Vital Signs    Pre SpO2 (%) 94  -AR     O2 Delivery Pre Treatment supplemental O2   3L   -AR     Intra SpO2 (%) 83  -AR     O2 Delivery Intra Treatment supplemental O2   3L  -AR     Post SpO2 (%) 94  -AR     O2 Delivery Post Treatment supplemental O2  -AR     Row Name 10/09/18 1111          Physical Therapy Goals    Transfer Goal Selection (PT) transfer, PT goal 1  -AR     Gait Training Goal Selection (PT) gait training, PT goal 1  -AR     Row Name 10/09/18 1111          Transfer Goal 1 (PT)    Activity/Assistive Device (Transfer Goal 1, PT) sit-to-stand/stand-to-sit  -AR     [place] Level/Cues Needed (Transfer Goal 1, PT) supervision required  -AR     Time Frame (Transfer Goal 1, PT) 1 week  -AR     Row Name 10/09/18 1111          Gait Training Goal 1 (PT)    [place] Level (Gait Training Goal 1, PT) supervision required  -AR     Distance (Gait Goal 1, PT) 300  -AR     Time Frame (Gait Training Goal 1, PT) 1 week  -AR     Row Name 10/09/18 1111          Positioning and Restraints    Pre-Treatment Position in bed  -AR     Post Treatment Position chair  -AR     In Chair sitting;call light within reach;encouraged to call for assist  -AR       User Key  (r) = Recorded By, (t) = Taken By, (c) = Cosigned By    Initials Name Provider Type    Marie Scott PT Physical Therapist          Physical Therapy Education     Title: PT OT SLP Therapies (Done)     Topic: Physical Therapy (Done)     Point: Mobility training (Done)    Learning Progress Summary     Learner Status Readiness Method Response Comment Documented by    Patient Done Acceptance E VU  AR 10/09/18 1118          Point: Home exercise program (Done)    Learning Progress Summary      Learner Status Readiness Method Response Comment Documented by    Patient Done Acceptance E VU  AR 10/09/18 1118          Point: Body mechanics (Done)    Learning Progress Summary     Learner Status Readiness Method Response Comment Documented by    Patient Done Acceptance E VU  AR 10/09/18 1118          Point: Precautions (Done)    Learning Progress Summary     Learner Status Readiness Method Response Comment Documented by    Patient Done Acceptance E VU  AR 10/09/18 1118                      User Key     Initials Effective Dates Name Provider Type [facility] 04/03/18 -  Marie Lopes, PT Physical Therapist PT                PT Recommendation and Plan  Anticipated Discharge Disposition (PT): home with home health, home with assist (anticipating progress)  Planned Therapy Interventions (PT Eval): balance training, bed mobility training, gait training, joint mobilization, patient/family education, ROM (range of motion), stair training, strengthening  Therapy Frequency (PT Clinical Impression): daily  Outcome Summary/Treatment Plan (PT)  Anticipated Discharge Disposition (PT): home with home health, home with assist (anticipating progress)  Plan of Care Reviewed With: patient  Outcome Summary: Pt admitted 10/8 with pneumonia, COPD, and +smoker.  Pt reports independence, no use of AD, no falls.  Assist for groceries due to dyspnea.  Pt able to ambulate 60' w/ contact assist, single UE support on IV pole, and on 3L NC Sp02 to low 80s.  Educated on activity/walking recommendations and PT POC.           Time Calculation:         PT Charges     Row Name 10/09/18 1127             Time Calculation    Start Time 1108  -AR      Stop Time 1127  -AR      Time Calculation (min) 19 min  -AR      PT Received On 10/09/18  -AR      PT - Next Appointment 10/10/18  -AR      PT Goal Re-Cert Due Date 10/16/18  -AR        User Key  (r) = Recorded By, (t) = Taken By, (c) = Cosigned By    Initials Name Provider Type    DERICK Lopes  Marie, PT Physical Therapist        Therapy Suggested Charges     Code   Minutes Charges    None           Therapy Charges for Today     Code Description Service Date Service Provider Modifiers Qty    07309470829 HC PT EVAL MOD COMPLEXITY 2 10/9/2018 Marie Lopes, PT GP 1    15524629612 HC PT THER PROC EA 15 MIN 10/9/2018 Marie Lopes, PT GP 1                 Marie Lopes, PT  10/9/2018

## 2018-10-09 NOTE — PROGRESS NOTES
Name: Parth Woody ADMIT: 10/8/2018   : 1940  PCP: Jaime Chung MD    MRN: [id number] LOS: 1 days   AGE/SEX: 78 y.o. male  ROOM: [facility]     Subjective   No new complaints. States he feels better.    Objective   Temp:  [97.3 °F (36.3 °C)-101.1 °F (38.4 °C)] 97.7 °F (36.5 °C)  Heart Rate:  [] 94  Resp:  [18-28] 20  BP: (100-136)/(60-83) 136/82  SpO2:  [82 %-98 %] 91 %  on  Flow (L/min):  [3-4] 3;   Device (Oxygen Therapy): nasal cannula  Body mass index is 16.04 kg/m².    Physical Exam   Constitutional: He is oriented to person, place, and time. He appears cachectic. He is cooperative. He has a sickly appearance. No distress.   Neck: No JVD present. No tracheal deviation present.   Cardiovascular: Normal rate and regular rhythm.    No murmur heard.  Pulmonary/Chest: Effort normal. No respiratory distress. He has decreased breath sounds. He has wheezes.   Abdominal: Soft. Normal appearance and bowel sounds are normal. He exhibits no distension. There is no tenderness.   Musculoskeletal: He exhibits no edema.   Neurological: He is alert and oriented to person, place, and time.   Skin: Skin is warm and dry.   Psychiatric: He has a normal mood and affect. His behavior is normal.   Nursing note and vitals reviewed.      Results Review:       I reviewed the patient's new clinical results.    Results from last 7 days  Lab Units 10/09/18  0540 10/08/18  1701   WBC 10*3/mm3 8.57 12.65*   HEMOGLOBIN g/dL 12.9* 14.2   PLATELETS 10*3/mm3 176 196       Results from last 7 days  Lab Units 10/09/18  0540 10/08/18  1701   SODIUM mmol/L 136 133*   POTASSIUM mmol/L 3.5 4.0   CHLORIDE mmol/L 96* 95*   CO2 mmol/L 26.3 24.3   BUN mg/dL 16 15   CREATININE mg/dL 0.70* 0.77   GLUCOSE mg/dL 218* 127*   Estimated Creatinine Clearance: 57.1 mL/min (A) (by C-G formula based on SCr of 0.7 mg/dL (L)).    Results from last 7 days  Lab Units 10/09/18  0540 10/08/18  1701   CALCIUM mg/dL 8.6 8.5*   ALBUMIN g/dL  --  3.80    MAGNESIUM mg/dL  --  1.9       Results from last 7 days  Lab Units 10/09/18  0540 10/08/18  1709 10/08/18  1701   PROCALCITONIN ng/mL 0.84*  --  1.12*   LACTATE mmol/L  --  1.8  --      Lab Results   Component Value Date    STREPPNEUAG Negative 10/08/2018    LEGANTIGENUR Negative 10/08/2018       Results from last 7 days  Lab Units 10/08/18  2319 10/08/18  1849   BLOODCX   --  No growth at less than 24 hours   RESPCX  Culture in progress  --    GRAM STAIN RESULT  Few (2+) Mixed bacterial morphotypes seen on Gram Stain  --    VRP negative    Results from last 7 days  Lab Units 10/08/18  1812   PH, ARTERIAL pH units 7.429   PCO2, ARTERIAL mm Hg 37.2   PO2 ART mm Hg 77.8*   FLOW RATE lpm 2.5   MODALITY  Cannula   O2 SATURATION CALC % 95.8   P/F ratio = 77.8/0.3 = 259      CT Angiogram Chest With Contrast   1. No pulmonary embolism.  2. Left lower lobe pneumonia, follow-up recommended. Extensive  emphysematous and chronic changes of the lungs.        ceftriaxone 1 g Intravenous Q24H   And      doxycycline 100 mg Intravenous Q12H   [START ON 10/10/2018] enoxaparin 30 mg Subcutaneous Q24H   ipratropium-albuterol 3 mL Nebulization Q4H - RT   nicotine 1 patch Transdermal Q24H      Diet Regular      Assessment/Plan      Active Hospital Problems    Diagnosis Date Noted   • **Pneumonia [J18.9] 10/08/2018   • Severe malnutrition due to chronic illness [E43] 10/09/2018   • COPD exacerbation (CMS/[facility]) [J44.1] 10/08/2018   • Tobacco abuse [Z72.0] 10/08/2018   • Acute hypoxemic respiratory failure (CMS/[facility]) [J96.01] 10/08/2018      Resolved Hospital Problems    Diagnosis Date Noted Date Resolved   No resolved problems to display.       · Admitted to monitored unit.  · Continue DOXYCYCLINE + ROCEPHIN day 2 of 7 for treatment of community acquired pneumonia.    · Blood cultures x2 done in ED negative thus far.    · Sputum for gram stain and culture pending.  · VRP negative.  · Supplemental oxygen to keep SaO2 > 92%  · Pulmonary  consulted  · Physical therapy and nutritionist consulted      Serafin Concepcion MD  [facility]  10/09/18  4:38 PM

## 2018-10-09 NOTE — CONSULTS
Adult Nutrition  Assessment/PES    Patient Name:  Parth Woody  YOB: 1940  MRN: [id number]  Admit Date:  10/8/2018    Assessment Date:  10/9/2018    Comments:  Consult for nutrition assessment, ordered by MD.  Patient with COPD.  Does not take medications at home or go to a doctor per chart report.    Patient reports good appetite, says eats well at home always.  Says eats 3 meals/day.  Eating very well at lunch today at time of visit.  88% x 2 meals per chart PO data.  Has never tried oral nutrition supplements in the past per his report.  Agrees to try some during hospital stay.  Ordered Ensure Enlive BID (will change to Boost Plus after tomorrow d/t hospital wide change over to [employer] products).    Patient reports 50# (30.3%) weight loss x 5 years.  MSA completed for severe chronic illness malnutrition based upon %IBW, %UBW, BMI, and physical exam.    Will continue to follow.          Reason for Assessment     Row Name 10/09/18 1340          Reason for Assessment    Reason For Assessment physician consult;identified at risk by screening criteria     Diagnosis pulmonary disease   COPD; adm with PNA     Identified At Risk by Screening Criteria BMI             Nutrition/Diet History     Row Name 10/09/18 1340          Nutrition/Diet History    Typical Food/Fluid Intake reports good appetite, says it is normally good at home too     Meal/Snack Patterns eats 3 meals/day     Supplemental Drinks/Foods/Additives has never tried any, agrees to try Ensure/Boost during admission, also discussed buying these at home or generic brand             Anthropometrics     Row Name 10/09/18 1341          Admit Weight    Admit Weight --   115# 10/9        Ideal Body Weight (IBW)    % of Ideal Body Weight Assessment less than 70%: severe deficit   65.9%        Usual Body Weight (UBW)    Usual Body Weight 74.8 kg (165 lb)     % of Usual Body Weight Assessment less than 74% - severe deficit   69.8%     Weight Loss  unintentional   50# (30.3%)     Weight Loss Time Frame 5 years        Body Mass Index (BMI)    BMI Assessment BMI 16-16.9: protein-energy malnutrition grade II             Labs/Tests/Procedures/Meds     Row Name 10/09/18 1342          Labs/Procedures/Meds    Lab Results Reviewed reviewed, pertinent     Lab Results Comments Gluc, Creat, Hgb        Diagnostic Tests/Procedures    Diagnostic Test/Procedure Reviewed reviewed, pertinent        Medications    Pertinent Medications Reviewed reviewed, pertinent             Physical Findings     Row Name 10/09/18 1343          Physical Findings    Overall Physical Appearance generalized wasting;loss of subcutaneous fat;loss of muscle mass;underweight     Skin --   B=19, intact             Estimated/Assessed Needs     Row Name 10/09/18 1343          Calculation Measurements    Weight Used For Calculations 52.2 kg (115 lb 1.3 oz)        Estimated/Assessed Needs    Additional Documentation KCAL/KG (Group);Protein Requirements (Group);Fluid Requirements (Group)        KCAL/KG    14 Kcal/Kg (kcal) 730.8     15 Kcal/Kg (kcal) 783     18 Kcal/Kg (kcal) 939.6     20 Kcal/Kg (kcal) 1044     25 Kcal/Kg (kcal) 1305     30 Kcal/Kg (kcal) 1566     35 Kcal/Kg (kcal) 1827     40 Kcal/Kg (kcal) 2088     45 Kcal/Kg (kcal) 2349     50 Kcal/Kg (kcal) 2610     kcal/kg (Specify) --   1827        [place]-St. Jeor Equation    RMR ([place]-St. Jeor Equation) 1269.13        Protein Requirements    Est Protein Requirement Amount (gms/kg) 1.5 gm protein     Estimated Protein Requirements (gms/day) 78.3        Fluid Requirements    Estimated Fluid Requirements (mL/day) 1566     Estimated Fluid Requirement Method RDA Method     RDA Method (mL) 1566     Sandra-Cece Method (over 20 kg) 2544             Nutrition Prescription Ordered     Row Name 10/09/18 1344          Nutrition Prescription PO    Current PO Diet Regular             Evaluation of Received Nutrient/Fluid Intake     Row Name 10/09/18 5714  10/09/18 1343       Calculation Measurements    Weight Used For Calculations  -- 52.2 kg (115 lb 1.3 oz)       PO Evaluation    Number of Meals 2  --    % PO Intake 88  --            Evaluation of Prescribed Nutrient/Fluid Intake     Row Name 10/09/18 1343          Calculation Measurements    Weight Used For Calculations 52.2 kg (115 lb 1.3 oz)             Malnutrition Severity Assessment     Row Name 10/09/18 1344          Malnutrition Severity Assessment    Malnutrition Type Chronic Illness Malnutrition        Physical Signs of Malnutrition (Chronic)    Muscle Wasting Severe   moderate interosseous region; severe clavicle region, acromion region, patellar region     Fat Loss Severe   moderate orbital region; moderate-severe thoracic region; severe triceps region        Weight Status (Chronic)    BMI Mod (<17)     %IBW Severe (<70%)     %UBW Severe (<75%)        Criteria Met (Must meet criteria for severity in at least 2 of these categories: M Wasting, Fat Loss, Fluid, Secondary Signs, Wt. Status, Intake)    Patient meets criteria for  Severe malnutrition         Problem/Interventions:        Problem 1     Row Name 10/09/18 1345          Nutrition Diagnoses Problem 1    Problem 1 Malnutrition     Etiology (related to) Medical Diagnosis     Pulmonary/Critical Care COPD   does not go to a doctor and does not take meds at home     Signs/Symptoms (evidenced by) % UBW;% IBW;Unintended Weight Change;Report/Observation;Other (comment)   physical exam     Percent (%) IBW 65.9 %     Percent (%) UBW 69.8 %     Unintended Weight Change Loss     Number of Pounds Lost 50     Weight loss time period 5 years     Reported/Observed By Patient                     Intervention Goal     Row Name 10/09/18 1346          Intervention Goal    General Maintain nutrition;Reduce/improve symptoms;Disease management/therapy;Meet nutritional needs for age/condition     PO Maintain intake     Weight Appropriate weight gain             Nutrition  Intervention     Row Name 10/09/18 1346          Nutrition Intervention    RD/Tech Action Follow Tx progress;Care plan reviewd;Encourage intake;Recommend/ordered     Recommended/Ordered Supplement             Nutrition Prescription     Row Name 10/09/18 1346          Nutrition Prescription PO    PO Prescription Begin/change supplement     Supplement Ensure Enlive     Supplement Frequency 2 times a day     New PO Prescription Ordered? Yes             Education/Evaluation     Row Name 10/09/18 1347          Education    Education Will Instruct as appropriate        Monitor/Evaluation    Monitor Per protocol;PO intake;Supplement intake;Pertinent labs;Weight         Electronically signed by:  Daksha Delacruz RD  10/09/18 1:47 PM

## 2018-10-09 NOTE — PROGRESS NOTES
Malnutrition Severity Assessment    Patient Name:  Parth Woody  YOB: 1940  MRN: [id number]  Admit Date:  10/8/2018    Patient meets criteria for : Severe malnutrition    Comments:  65.9% IBW, 69.8% UBW, 50# (30.3%) weight loss x 5 years, BMI 16.04.    Physical exam completed and detailed in chart below.    Malnutrition Type: Chronic Illness Malnutrition     Malnutrition Type (last 8 hours)      Malnutrition Severity Assessment     Row Name 10/09/18 1344       Malnutrition Severity Assessment    Malnutrition Type Chronic Illness Malnutrition    Row Name 10/09/18 1344       Physical Signs of Malnutrition (Chronic)    Muscle Wasting Severe   moderate interosseous region; severe clavicle region, acromion region, patellar region    Fat Loss Severe   moderate orbital region; moderate-severe thoracic region; severe triceps region    Row Name 10/09/18 1344       Weight Status (Chronic)    BMI Mod (<17)    %IBW Severe (<70%)    %UBW Severe (<75%)    Row Name 10/09/18 1344       Criteria Met (Must meet criteria for severity in at least 2 of these categories: M Wasting, Fat Loss, Fluid, Secondary Signs, Wt. Status, Intake)    Patient meets criteria for  Severe malnutrition          Electronically signed by:  Daksha Delacruz RD  10/09/18 1:51 PM

## 2018-10-09 NOTE — PROGRESS NOTES
Pulmonary Consultation     Patient Name: Parth Woody  Age/Sex: 78 y.o. male  : 1940  MRN: [id number]    Date of Admission: 10/8/2018  Date of Encounter Visit: 10/08/18  Encounter Provider: Mc Mixon MD  Referring Provider: Dino Majano*  Place of Service: [facility]  Patient Care Team:  Jaime Chung MD as PCP - General  Jaime Chung MD as PCP - Family Medicine  Lizzette, Gerardo Cardoza MD as PCP - Claims Attributed      Subjective:     Consulted for: LLL pneumonia with advanced COPD    Chief Complaint: shortness of breath    History of Present Illness:  Parth Woody is a 78 y.o. male with a history of COPD who presented to the hospital with complaints of shortness of breath and weakness that has worsened over the past 3 months. He also complains of fever, productive thick yellowish brown sputum cough, diarrhea and sharp chest discomfort that is worse with cough. He still smokes 1 PPD and has been a smoker since age 17. Reports that he had an abnormal CT of the chest over 30 years ago, but is unsure of the details. He was on ventolin inhaler at one point with some improvement in breathing, but has not been on it for years.     In the ER he was noted to be hypoxic with saturations down to 85% and febrile with temp of 101.1. CBC showed mild leukocytosis with left shift and procalcitonin and D dimer were elevated. ABG showed hypoxia with no hypercapnia. CXR showed a patchy infiltrate in the LLL and signs of emphysematous changes. CTA of the chest showed no PE and persistent LLL pneumonia with some atelectasis of the RLL in the lingula. He was treated with rocephin, doxycycline, bronchodilators and steroids.     Review of Systems:   Review of Systems   Constitutional: Positive for fever. Negative for activity change and appetite change.   HENT: Negative for congestion and sore throat.    Eyes: Negative.    Respiratory: Positive for cough and shortness of breath.    Cardiovascular:  Positive for chest pain. Negative for leg swelling.   Gastrointestinal: Negative for abdominal pain, diarrhea and vomiting.   Endocrine: Negative.    Genitourinary: Negative for decreased urine volume and dysuria.   Musculoskeletal: Negative for neck pain.   Skin: Negative for rash and wound.   Allergic/Immunologic: Negative.    Neurological: Positive for weakness. Negative for numbness and headaches.   Hematological: Negative.    Psychiatric/Behavioral: Negative.    All other systems reviewed and are negative.    Past Medical History:  Past Medical History:   Diagnosis Date   • COPD (chronic obstructive pulmonary disease) (CMS/[facility])        Past Surgical History:   Procedure Laterality Date   • CERVICAL SPINE SURGERY     • PROSTATECTOMY         Home Medications:   Prescriptions Prior to Admission   Medication Sig Dispense Refill Last Dose   • acetaminophen (TYLENOL) 500 MG tablet Take 500 mg by mouth Every 6 (Six) Hours As Needed for Mild Pain .      • diphenhydrAMINE (BENADRYL) 25 mg capsule Take 25 mg by mouth Every Morning.          Inpatient Medications:  Scheduled Meds:  [START ON 10/9/2018] ceftriaxone 1 g Intravenous Q24H   And      [START ON 10/9/2018] doxycycline 100 mg Intravenous Q12H   [START ON 10/9/2018] enoxaparin 40 mg Subcutaneous Q24H   ipratropium-albuterol 3 mL Nebulization Q4H - RT   nicotine 1 patch Transdermal Q24H   sodium chloride 3 mL Intravenous Q12H     Continuous Infusions:   PRN Meds:.•  acetaminophen  •  ipratropium-albuterol  •  ondansetron  •  sodium chloride    Allergies:  No Known Allergies    Past Social History:  Social History     Social History   • Marital status:      Social History Main Topics   • Smoking status: Current Every Day Smoker     Packs/day: 1.00     Types: Cigarettes   • Alcohol use Yes      Comment: regular   • Drug use: No     Other Topics Concern   • Not on file       Past Family History:  History reviewed. No pertinent family history.        Objective:    Temp:  [98.3 °F (36.8 °C)-101.1 °F (38.4 °C)] 98.3 °F (36.8 °C)  Heart Rate:  [] 68  Resp:  [22-24] 24  BP: (100-118)/(60-83) 100/60  SpO2:  [82 %-97 %] 96 %  on  Flow (L/min):  [3] 3 Device (Oxygen Therapy): nasal cannula     Intake/Output Summary (Last 24 hours) at 10/08/18 2317  Last data filed at 10/08/18 2134   Gross per 24 hour   Intake              100 ml   Output                0 ml   Net              100 ml     Body mass index is 16.6 kg/m².  1    10/08/18  1746   Weight: 54 kg (119 lb)     Weight change:     Physical Exam:   Physical Exam   General:    Pleasant elederly man, appears older than stated age, cachectic and frail, tachypnea with purse lipped breathing, barrel chest, on oxygen per NC at 3 LPM                    Head:    Normocephalic, atraumatic.   Eyes:          Conjunctivae and sclerae normal, no icterus, PERRLA   Throat:   No oral lesions, no thrush, oral mucosa moist.    Neck:   Supple, trachea midline.   Lungs:     Normal chest on inspection, diminished breath sounds with minimal LLL wheezes. No rhonchi. No crackles. Respirations slightly labored and fast.     Heart:    Regular rhythm and normal rate.  No murmurs, gallops, or rubs noted.   Abdomen:     Soft, concave, mild hepatomegaly, non-tender, non-distended, positive bowel sounds.    Extremities:   Clubbing. no cyanosis, or edema.     Pulses:   Pulses palpable and equal bilaterally.    Skin:   No bleeding or rash.   Neuro:   Non-focal.  Moves all extremities well.    Psychiatric:   Normal mood and affect.     Lab Review:     Results from last 7 days  Lab Units 10/08/18  1701   SODIUM mmol/L 133*   POTASSIUM mmol/L 4.0   CHLORIDE mmol/L 95*   CO2 mmol/L 24.3   BUN mg/dL 15   CREATININE mg/dL 0.77   GLUCOSE mg/dL 127*   CALCIUM mg/dL 8.5*   AST (SGOT) U/L 39   ALT (SGPT) U/L 21   ALBUMIN g/dL 3.80       Results from last 7 days  Lab Units 10/08/18  1701   TROPONIN T ng/mL <0.010       Results from last 7 days  Lab Units  10/08/18  1701   WBC 10*3/mm3 12.65*   HEMOGLOBIN g/dL 14.2   HEMATOCRIT % 44.7   PLATELETS 10*3/mm3 196   MCV fL 89.6   MCH pg 28.5   MCHC g/dL 31.8*   RDW % 14.7*   RDW-SD fl 47.8   MPV fL 10.7   NEUTROPHIL % % 86.4*   LYMPHOCYTE % % 4.3*   MONOCYTES % % 9.0   EOSINOPHIL % % 0.0*   BASOPHIL % % 0.1   IMM GRAN % % 0.2   NEUTROS ABS 10*3/mm3 10.94*   LYMPHS ABS 10*3/mm3 0.54*   MONOS ABS 10*3/mm3 1.14   EOS ABS 10*3/mm3 0.00   BASOS ABS 10*3/mm3 0.01   IMMATURE GRANS (ABS) 10*3/mm3 0.02           Results from last 7 days  Lab Units 10/08/18  1701   MAGNESIUM mg/dL 1.9           Invalid input(s): LDLCALC    Results from last 7 days  Lab Units 10/08/18  1701   PROBNP pg/mL 536.5   D DIMER QUANT MCGFEU/mL 1.20*           Results from last 7 days  Lab Units 10/08/18  1812   PH, ARTERIAL pH units 7.429   PCO2, ARTERIAL mm Hg 37.2   PO2 ART mm Hg 77.8*   HCO3 ART mmol/L 24.6       Results from last 7 days  Lab Units 10/08/18  1709 10/08/18  1701   PROCALCITONIN ng/mL  --  1.12*   LACTATE mmol/L 1.8  --                                  Imaging:  Imaging Results (most recent)     Procedure Component Value Units Date/Time    CT Angiogram Chest With Contrast [[id number]] Collected:  10/08/18 1919     Updated:  10/08/18 1933    Narrative:       CT ANGIOGRAM CHEST W CONTRAST-     INDICATIONS: Short of breath, fever, possible pulmonary embolism   Radiation dose reduction techniques were utilized, including automated  exposure control and exposure modulation based on body size.     TECHNIQUE: CT ANGIOGRAPHY OF THE CHEST WITH THREE-DIMENSIONAL  RECONSTRUCTIONS     COMPARISON: None available     FINDINGS:      No pulmonary embolism. No aortic dissection.     The heart size is normal, with small pericardial effusion, small  pericardial recess fluid. A few small subcentimeter short axis  mediastinal lymph nodes are seen that are not significant by size  criteria.     The airways appear clear.     No pleural effusion or pneumothorax.      The lungs again show extensive emphysematous changes, as well as a  partly calcified scarlike density in the right upper lobe that appears  similar to the prior exam. Patchy and consolidative opacities in the  left lower lobe suggest pneumonia, follow-up recommended to exclude any  possibility of underlying lesion. Mild likely atelectasis versus also  seen in the lingula and right lower lung.     Upper abdominal structures show hepatic cysts and low densities too  small to characterize, right renal cyst.     Degenerative changes are seen in the spine. Motion artifact somewhat  limits assessment of the bones, especially at the level of the sternum.     .       Impression:               1. No pulmonary embolism.  2. Left lower lobe pneumonia, follow-up recommended. Extensive  emphysematous and chronic changes of the lungs.     Discussed by telephone with Dr. Angulo at 1930, 10/8/2018.     This report was finalized on 10/8/2018 7:30 PM by Dr. George Hernandez M.D.         XR Chest 1 View [[id number]] Collected:  10/08/18 1712     Updated:  10/08/18 1717    Narrative:       PORTABLE CHEST 10/8/2018 AT 1706 HOURS     CLINICAL HISTORY: Dyspnea. Cough. COPD.     The lungs are markedly hyperinflated with diminished markings in the  upper lung zones consistent with severe emphysema. There is patchy  infiltrate at the left lung base consistent with pneumonia. No pleural  effusions are identified. The heart is normal in size.     This report was finalized on 10/8/2018 5:14 PM by Dr. Billy Valladares M.D.             I personally viewed and interpreted the patient's imaging studies.    Assessment:     1. LLL pneumonia  2. 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 Acute hypoxic respiratory failure related to above  4. Chronic tobacco use  5. Chronic alcohol abuse  6. Elevated d dimer with CTA negative for PE  7. Weight loss with cachexia  8. Pectus excavatum       Plan:     Continue rocephin and doxycycline for now. If no  clinical improvement would consider adding something with good pseudomonas coverage.   Sputum culture.   Smoking cessation  CT did show some RML and RLL atelectasis vs consolidation that could possibly be HILARIO or malignancy. may consider further evaluation if no improvement. However, he may be a poor candidate for treatment given his muscle wasting and age.   No significant wheezing to warrant steroids at this time.   Bronchodilators    Recommend follow up after discharge with PFT and consider long-term inhaler therapy.     Oxygen evaluation at time of discharge and wean to keep saturations greater than 88%.         Thank you for allowing me to participate in the care of Parth Woody. Feel free to contact me directly with any further questions or concerns.    Mc Mixon MD  [facility]   10/08/18  11:17 PM    Dictated utilizing Dragon dictation

## 2018-10-09 NOTE — PLAN OF CARE
Problem: Patient Care Overview  Goal: Plan of Care Review   10/09/18 1119   Coping/Psychosocial   Plan of Care Reviewed With patient   OTHER   Outcome Summary Pt admitted 10/8 with pneumonia, COPD, and +smoker. Pt reports independence, no use of AD, no falls. Assist for groceries due to dyspnea. Pt able to ambulate 60' w/ contact assist, single UE support on IV pole, and on 3L NC Sp02 to low 80s. Educated on activity/walking recommendations and PT POC.

## 2018-10-10 PROBLEM — J98.11 ATELECTASIS OF RIGHT LUNG: Status: ACTIVE | Noted: 2018-01-01

## 2018-10-10 PROBLEM — A41.9 SEPSIS (HCC): Status: ACTIVE | Noted: 2018-01-01

## 2018-10-10 NOTE — PLAN OF CARE
"Problem: Pneumonia (Adult)  Goal: Signs and Symptoms of Listed Potential Problems Will be Absent, Minimized or Managed (Pneumonia)  Outcome: Ongoing (interventions implemented as appropriate)      Problem: Chronic Obstructive Pulmonary Disease (Adult)  Goal: Signs and Symptoms of Listed Potential Problems Will be Absent, Minimized or Managed (Chronic Obstructive Pulmonary Disease)  Outcome: Ongoing (interventions implemented as appropriate)      Problem: Patient Care Overview  Goal: Plan of Care Review  Outcome: Ongoing (interventions implemented as appropriate)   10/10/18 0821   Coping/Psychosocial   Plan of Care Reviewed With patient   OTHER   Outcome Summary Pt had episode of soa with increased anxiety after attempted to. 02 91% 3L NC RR 26 and labored. Breathing tx given with no relief after 20 min chest tight. . Order for 1x IV solu-medrol. Steroid seemed to help pt. Pt seemed more comfortable, breathing slowed down and bcame lass labored. Pt stated he \" felt more relaxed..easier to breathe.\" Continues on IV abt. Afebrile. Pt unable to sleep for long periods of time this shift. \"Cat-napped\".   Plan of Care Review   Progress no change       Problem: Fall Risk (Adult)  Goal: Identify Related Risk Factors and Signs and Symptoms  Outcome: Outcome(s) achieved Date Met: 10/10/18    Goal: Absence of Fall  Outcome: Ongoing (interventions implemented as appropriate)      Problem: Breathing Pattern Ineffective (Adult)  Goal: Identify Related Risk Factors and Signs and Symptoms  Outcome: Outcome(s) achieved Date Met: 10/10/18    Goal: Effective Oxygenation/Ventilation  Outcome: Ongoing (interventions implemented as appropriate)    Goal: Anxiety/Fear Reduction  Outcome: Ongoing (interventions implemented as appropriate)        "

## 2018-10-10 NOTE — THERAPY TREATMENT NOTE
Acute Care - Physical Therapy Treatment Note  [facility]     Patient Name: Parth Woody  : 1940  MRN: [id number]  Today's Date: 10/10/2018  Onset of Illness/Injury or Date of Surgery: 10/08/18          Admit Date: 10/8/2018    Visit Dx:    ICD-10-CM ICD-9-CM   1. COPD exacerbation (CMS/[facility]) J44.1 491.21   2. Acute respiratory failure with hypoxia (CMS/[facility]) J96.01 518.81   3. Community acquired pneumonia of left lower lobe of lung (CMS/[facility]) J18.1 481     Patient Active Problem List   Diagnosis   • COPD exacerbation (CMS/[facility])   • Pneumonia   • Tobacco abuse   • Acute hypoxemic respiratory failure (CMS/[facility])   • Severe malnutrition due to chronic illness   • Sepsis (CMS/[facility])       Therapy Treatment          Rehabilitation Treatment Summary     Row Name 10/10/18 1540             Treatment Time/Intention    Discipline physical therapy assistant  -      Document Type therapy note (daily note)  -      Subjective Information complains of;weakness;fatigue;dyspnea  -      Mode of Treatment physical therapy  -SM      Patient Effort fair  -SM      Existing Precautions/Restrictions fall;oxygen therapy device and L/min  -SM      Recorded by [SM] Kriss Fisher PTA 10/10/18 1600      Row Name 10/10/18 1540             Vital Signs    Pre SpO2 (%) 90  -SM      O2 Delivery Pre Treatment supplemental O2  -SM      Intra SpO2 (%) 84  -SM      O2 Delivery Intra Treatment supplemental O2  -SM      Post SpO2 (%) 88  -SM      O2 Delivery Post Treatment supplemental O2  -SM      Recorded by [SM] Kriss Fisher PTA 10/10/18 1600      Row Name 10/10/18 1540             Cognitive Assessment/Intervention    Additional Documentation Cognitive Assessment/Intervention (Group)  -SM      Recorded by [SM] Kriss Fisher PTA 10/10/18 1600      Row Name 10/10/18 1540             Cognitive Assessment/Intervention- PT/OT    Orientation Status (Cognition) oriented x 4  -SM      Follows Commands (Cognition) WFL  -SM       Personal Safety Interventions fall prevention program maintained;gait belt;nonskid shoes/slippers when out of bed  -SM      Recorded by [SM] Kriss Fisher, PTA 10/10/18 1600      Row Name 10/10/18 1540             Bed Mobility Assessment/Treatment    Bed Mobility Assessment/Treatment supine-sit  -SM      Supine-Sit [place] (Bed Mobility) supervision  -SM      Sit-Supine [place] (Bed Mobility) not tested  -SM      Assistive Device (Bed Mobility) bed rails;head of bed elevated  -SM      Recorded by [SM] Kriss Fisher, PTA 10/10/18 1600      Row Name 10/10/18 1540             Transfer Assessment/Treatment    Transfer Assessment/Treatment sit-stand transfer;stand-sit transfer  -SM      Recorded by [] Kriss Fisher, [facility] 10/10/18 1600      Row Name 10/10/18 1540             Sit-Stand Transfer    Sit-Stand [place] (Transfers) stand by assist  -      Recorded by [] Kriss Fisher, [facility] 10/10/18 1600      Row Name 10/10/18 1540             Stand-Sit Transfer    Stand-Sit [place] (Transfers) stand by assist  -      Recorded by [SM] Kriss Fisher, [facility] 10/10/18 1600      Row Name 10/10/18 1540             Gait/Stairs Assessment/Training    [place] Level (Gait) stand by assist  -      Distance in Feet (Gait) 5  -SM      Pattern (Gait) step-through  -SM      Deviations/Abnormal Patterns (Gait) sandra decreased;stride length decreased  -SM      Comment (Gait/Stairs) pt ambulated to chair, then refused to walk any further d/t wanting to eat lunch that was still in his room  -SM      Recorded by [SM] Kriss Fisher, ALEX 10/10/18 1600      Row Name 10/10/18 1540             Positioning and Restraints    Pre-Treatment Position in bed  -SM      Post Treatment Position chair  -SM      In Chair sitting;call light within reach;encouraged to call for assist  -SM      Recorded by [SM] Kriss Fisher, ALEX 10/10/18 1600      Row Name 10/10/18 1540             Pain  Scale: Numbers Pre/Post-Treatment    Pain Scale: Numbers, Pretreatment 0/10 - no pain  -      Recorded by [] FisherKriss ALEX Amador 10/10/18 1600        User Key  (r) = Recorded By, (t) = Taken By, (c) = Cosigned By    Initials Name Effective Dates Discipline     Kriss FisherALEX 03/07/18 -  PT                     Physical Therapy Education     Title: PT OT SLP Therapies (Done)     Topic: Physical Therapy (Done)     Point: Mobility training (Done)    Learning Progress Summary     Learner Status Readiness Method Response Comment Documented by    Patient Done Acceptance E,TB VU,NR   10/10/18 1600     Done Acceptance E VU  AR 10/09/18 1118          Point: Home exercise program (Done)    Learning Progress Summary     Learner Status Readiness Method Response Comment Documented by    Patient Done Acceptance E,TB VU,NR   10/10/18 1600     Done Acceptance E VU  AR 10/09/18 1118          Point: Body mechanics (Done)    Learning Progress Summary     Learner Status Readiness Method Response Comment Documented by    Patient Done Acceptance E,TB VU,NR   10/10/18 1600     Done Acceptance E VU  AR 10/09/18 1118          Point: Precautions (Done)    Learning Progress Summary     Learner Status Readiness Method Response Comment Documented by    Patient Done Acceptance E,TB VU,NR   10/10/18 1600     Done Acceptance E VU  AR 10/09/18 1118                      User Key     Initials Effective Dates Name Provider Type Discipline    AR 04/03/18 -  Marie Lopes, PT Physical Therapist PT     03/07/18 -  Phillip Kriss Marjorie, ALEX Physical Therapy Assistant PT                    PT Recommendation and Plan     Plan of Care Reviewed With: patient  Progress: no change  Outcome Summary: Pt tolerated treatment fair this date. Only ambulated 5ft from bed to chair, then refused to walk any further. Initially had agreed to ambulate, though reached chair and wanted to eat his lunch that was still in his room. PT will  continue to address functional mobility deficits as tolerated.          Outcome Measures     Row Name 10/10/18 1600             How much help from another person do you currently need...    Turning from your back to your side while in flat bed without using bedrails? 4  -SM      Moving from lying on back to sitting on the side of a flat bed without bedrails? 4  -SM      Moving to and from a bed to a chair (including a wheelchair)? 3  -SM      Standing up from a chair using your arms (e.g., wheelchair, bedside chair)? 3  -SM      Climbing 3-5 steps with a railing? 3  -SM      To walk in hospital room? 3  -SM      AM-PAC 6 Clicks Score 20  -SM         Functional Assessment    Outcome Measure Options AM-PAC 6 Clicks Basic Mobility (PT)  -        User Key  (r) = Recorded By, (t) = Taken By, (c) = Cosigned By    Initials Name Provider Type    Kriss Dubose PTA Physical Therapy Assistant           Time Calculation:         PT Charges     Row Name 10/10/18 1604 10/10/18 1139          Time Calculation    Start Time 1540  -  --     Stop Time 1554  -SM  --     Time Calculation (min) 14 min  -  --     PT Received On 10/10/18  -  --     PT - Next Appointment 10/11/18  - 10/10/18  -       User Key  (r) = Recorded By, (t) = Taken By, (c) = Cosigned By    Initials Name Provider Type    Zoë Rascon, PT Physical Therapist    Kriss Dubose PTA Physical Therapy Assistant        Therapy Suggested Charges     Code   Minutes Charges    None           Therapy Charges for Today     Code Description Service Date Service Provider Modifiers Qty    67170196613 HC PT THER PROC EA 15 MIN 10/10/2018 Kriss Fisher PTA GP 1          PT G-Codes  Outcome Measure Options: AM-PAC 6 Clicks Basic Mobility (PT)  AM-PAC 6 Clicks Score: 20    Kriss Fisher PTA  10/10/2018

## 2018-10-10 NOTE — PROGRESS NOTES
Name: Parth Woody ADMIT: 10/8/2018   : 1940  PCP: Jaime Chung MD    MRN: [id number] LOS: 2 days   AGE/SEX: 78 y.o. male  ROOM: [facility]     Subjective   No new complaints. States he feels better. Had some confusion earlier    Objective   Temp:  [97.7 °F (36.5 °C)] 97.7 °F (36.5 °C)  Heart Rate:  [] 114  Resp:  [20-28] 24  BP: (136-151)/(81-95) 150/95  SpO2:  [91 %-97 %] 95 %  on  Flow (L/min):  [3-4] 3;   Device (Oxygen Therapy): nasal cannula  Body mass index is 16.04 kg/m².    Physical Exam   Constitutional: He is oriented to person, place, and time. He appears cachectic. He is cooperative. He has a sickly appearance. No distress.   Neck: No JVD present. No tracheal deviation present.   Cardiovascular: Normal rate and regular rhythm.    No murmur heard.  Pulmonary/Chest: Effort normal. No respiratory distress. He has decreased breath sounds. He has wheezes.   Abdominal: Soft. Normal appearance and bowel sounds are normal. He exhibits no distension. There is no tenderness.   Musculoskeletal: He exhibits no edema.   Neurological: He is alert and oriented to person, place, and time.   Skin: Skin is warm and dry.   Psychiatric: He has a normal mood and affect. His behavior is normal.   Nursing note and vitals reviewed.      Results Review:       I reviewed the patient's new clinical results.    Results from last 7 days  Lab Units 10/09/18  0540 10/08/18  1701   WBC 10*3/mm3 8.57 12.65*   HEMOGLOBIN g/dL 12.9* 14.2   PLATELETS 10*3/mm3 176 196       Results from last 7 days  Lab Units 10/09/18  0540 10/08/18  1701   SODIUM mmol/L 136 133*   POTASSIUM mmol/L 3.5 4.0   CHLORIDE mmol/L 96* 95*   CO2 mmol/L 26.3 24.3   BUN mg/dL 16 15   CREATININE mg/dL 0.70* 0.77   GLUCOSE mg/dL 218* 127*   Estimated Creatinine Clearance: 57.1 mL/min (A) (by C-G formula based on SCr of 0.7 mg/dL (L)).    Results from last 7 days  Lab Units 10/09/18  0540 10/08/18  1701   CALCIUM mg/dL 8.6 8.5*   ALBUMIN g/dL  --   3.80   MAGNESIUM mg/dL  --  1.9       Results from last 7 days  Lab Units 10/09/18  0540 10/08/18  1709 10/08/18  1701   PROCALCITONIN ng/mL 0.84*  --  1.12*   LACTATE mmol/L  --  1.8  --      Lab Results   Component Value Date    STREPPNEUAG Negative 10/08/2018    LEGANTIGENUR Negative 10/08/2018       Results from last 7 days  Lab Units 10/08/18  2319 10/08/18  1849   BLOODCX   --  No growth at 24 hours   RESPCX  Light growth (2+) Normal Respiratory Bettie  --    GRAM STAIN RESULT  Few (2+) Mixed bacterial morphotypes seen on Gram Stain  --    VRP negative    Results from last 7 days  Lab Units 10/09/18  2356 10/08/18  1812   PH, ARTERIAL pH units 7.425 7.429   PCO2, ARTERIAL mm Hg 39.6 37.2   PO2 ART mm Hg 80.3 77.8*   FLOW RATE lpm  --  2.5   MODALITY  Cannula Cannula   O2 SATURATION CALC % 96.0 95.8   P/F ratio = 77.8/0.3 = 259      CT Angiogram Chest With Contrast   1. No pulmonary embolism.  2. Left lower lobe pneumonia, follow-up recommended. Extensive  emphysematous and chronic changes of the lungs.        ceftriaxone 1 g Intravenous Q24H   And      doxycycline 100 mg Intravenous Q12H   enoxaparin 30 mg Subcutaneous Q24H   ipratropium-albuterol 3 mL Nebulization Q4H - RT   nicotine 1 patch Transdermal Q24H      Diet Regular      Assessment/Plan      Active Hospital Problems    Diagnosis Date Noted   • **Pneumonia [J18.9] 10/08/2018   • Sepsis (CMS/[facility]) [A41.9] 10/10/2018   • Atelectasis of right lung [J98.11] 10/10/2018   • Severe malnutrition due to chronic illness [E43] 10/09/2018   • COPD exacerbation (CMS/[facility]) [J44.1] 10/08/2018   • Tobacco abuse [Z72.0] 10/08/2018   • Acute hypoxemic respiratory failure (CMS/[facility]) [J96.01] 10/08/2018      Resolved Hospital Problems    Diagnosis Date Noted Date Resolved   No resolved problems to display.       · Admitted to monitored unit.  · Continue DOXYCYCLINE + ROCEPHIN day 3 of 7 for treatment of community acquired pneumonia.    · Cultures negative. Sputum  pending.  · Sepsis present on admit with fever, tachycardia, elevated PCT and WBC.  · Supplemental oxygen to keep SaO2 > 92%  · Pulmonary consulted. He sounds somewhat worse today. Defer to pulmonology adjustment in medications.  · Physical therapy and nutritionist consulted      Serafin Concepcion MD  [facility]  10/10/18  8:02 AM

## 2018-10-10 NOTE — PLAN OF CARE
Problem: Patient Care Overview  Goal: Plan of Care Review  Outcome: Ongoing (interventions implemented as appropriate)   10/10/18 1600   Coping/Psychosocial   Plan of Care Reviewed With patient   OTHER   Outcome Summary Pt tolerated treatment fair this date. Only ambulated 5ft from bed to chair, then refused to walk any further. Initially had agreed to ambulate, though reached chair and wanted to eat his lunch that was still in his room. PT will continue to address functional mobility deficits as tolerated.   Plan of Care Review   Progress no change

## 2018-10-10 NOTE — PROGRESS NOTES
[facility]  Phone: [phone]  Vinny Campos MD    Subjective:  LOS: 2    Sorry we missed seeing him yesterday.  Patient appears mildly confused.  A blood gas was done for him earlier today and no significant change.  He remains on low-flow oxygen.  His saturation was low when I walked in but this is apparently due to a dysfunctioning saturation monitor.  This was fixed by the RN.  Patient has a congested cough but has difficulty expectorating.  I spoke with his wife.  She states she has never seen a pulmonary physician.  He has been confused and forgetful for the last 6 months.  He does not have oxygen at home and she thinks he needs it.  He simply sits on the sofa and watches TV and smokes cigarettes.  He has a congested cough and expectorates phlegm.  No chest pain.    Objective   Vital Signs past 24hrs    Temp range: Temp (24hrs), Av.5 °F (36.4 °C), Min:96.9 °F (36.1 °C), Max:97.9 °F (36.6 °C)    BP range: BP: (128-151)/(81-95) 128/86  Pulse range: Heart Rate:  [] 110  Resp rate range: Resp:  [20-28] 28    Device (Oxygen Therapy): nasal cannulaFlow (L/min):  [3] 3  Oxygen range:SpO2:  [92 %-97 %] 92 %      52.2 kg (115 lb); Body mass index is 16.04 kg/m².    Intake/Output Summary (Last 24 hours) at 10/10/18 1732  Last data filed at 10/10/18 1449   Gross per 24 hour   Intake              480 ml   Output              750 ml   Net             -270 ml       Physical Exam   Constitutional: He appears cachectic.   HENT:   Head: Normocephalic and atraumatic.   Cardiovascular: Normal rate and regular rhythm.    No murmur heard.  Pulmonary/Chest: He is in respiratory distress. He has decreased breath sounds. He has wheezes (mild coarse). He has rhonchi. He has no rales.   Abdominal: Soft. Bowel sounds are normal. There is no tenderness.   Musculoskeletal: He exhibits no edema.   Neurological: He is alert.   Nursing note and vitals reviewed.    Results Review:    I have reviewed the laboratory and  imaging data since the last note by [facility] physician.  My annotations are noted in assessment and plan.    Medication Review:  I have reviewed the current MAR.  My annotations are noted in assessment and plan.       Plan   PCCM Problems  Left lower lobe pneumonia  Right middle lobe atelectasis  Acute respiratory failure, hypoxia  COPD cachexia  Severe COPD with exacerbation  Current tobacco use  Current alcohol use  Confusion    Plan of Treatment  Agree with present antibiotics.  Patient has been unable to expectorate much so unclear if current antibiotics covering infection.  I would like to get a good specimen with a bronchoscopy and also do a lavage of his lungs.  Patient declines.  I will add mucolytic 7 a flutter valve and see if he can get some of his secretions moving.    Patient probably has exacerbation of his COPD.  I will add methylprednisolone 40 mg twice a day to see if it helps.  He has a lot of coarse conducted sounds in his lungs and it is difficult to differentiate the wheeze.    He definitely has advanced COPD and COPD cachexia.  He needs follow-up in the pulmonary office.  He must quit smoking.  He needs maintenance bronchodilators on discharge.    I spoke with his wife.  She states he has been confused and forgetful at home for at least the last 6 months.  He has been difficult to communicate with in the hospital as well.  Perhaps this is not an acute change.  I will check a CT of the head and defer further evaluation to the primary team.    Vinny Campos MD  10/10/18  5:32 PM    Part of this note may be an electronic transcription/translation of spoken language to printed text using the Dragon Dictation System.

## 2018-10-11 NOTE — PROCEDURES
Procedure: Placing central venous access in right internal jugular vein    Indication: vascular access    Informed consent was obtained from the patient/family/health care surrogate after explaining risks, benefits and alternatives. Risks including bleeding, pneumothorax, infection and arrhythmia were discussed. Informed consent was given after complete understanding and all questions were answered.    Time out was completed and patient ID was confirmed.    The right internal jugular vein was directly visualized by ultrasound. Thereafter the site was cleaned and draped. 1% Lidocaine was given under the skin. Using sterile technique introducer needle was placed in the vein under direct visualization using ultrasound. A quadruple lumen catheter was placed over wire using Seldinger technique. All 4 ports withdrew blood and were flushed with saline. Sutures and dressing applied. No complications of procedure.    Stat portable chest x-ray has been requested and will be reviewed.

## 2018-10-11 NOTE — PLAN OF CARE
Problem: Pneumonia (Adult)  Goal: Signs and Symptoms of Listed Potential Problems Will be Absent, Minimized or Managed (Pneumonia)  Outcome: Ongoing (interventions implemented as appropriate)      Problem: Chronic Obstructive Pulmonary Disease (Adult)  Goal: Signs and Symptoms of Listed Potential Problems Will be Absent, Minimized or Managed (Chronic Obstructive Pulmonary Disease)  Outcome: Ongoing (interventions implemented as appropriate)      Problem: Patient Care Overview  Goal: Plan of Care Review  Outcome: Ongoing (interventions implemented as appropriate)   10/11/18 0430   Coping/Psychosocial   Plan of Care Reviewed With patient   OTHER   Outcome Summary PT IRRITABLE, AND FREQUENTLY REQUESTS THINGS OR QUESTIONS INTERVENTIONS. O2 AT 3LPM/NC WITH SATS STAYING AT 96-97%. OXYGEN ADJUSTED PER RT. HOB MAINTAINED AT 30-45DEGREES. FLUIDS ENCOURAGED. CT SCAN OF HEAD COMPLETED. RESP LABORED WITH ACCESSORY MUSCLES USED. WATCHED CLOSELY.    Plan of Care Review   Progress no change     Goal: Individualization and Mutuality  Outcome: Ongoing (interventions implemented as appropriate)    Goal: Discharge Needs Assessment  Outcome: Ongoing (interventions implemented as appropriate)    Goal: Interprofessional Rounds/Family Conf  Outcome: Ongoing (interventions implemented as appropriate)      Problem: Nutrition, Imbalanced: Inadequate Oral Intake (Adult)  Goal: Identify Related Risk Factors and Signs and Symptoms  Outcome: Ongoing (interventions implemented as appropriate)    Goal: Improved Oral Intake  Outcome: Ongoing (interventions implemented as appropriate)    Goal: Prevent Further Weight Loss  Outcome: Ongoing (interventions implemented as appropriate)      Problem: Fall Risk (Adult)  Goal: Absence of Fall  Outcome: Ongoing (interventions implemented as appropriate)      Problem: Breathing Pattern Ineffective (Adult)  Goal: Effective Oxygenation/Ventilation  Outcome: Ongoing (interventions implemented as  appropriate)    Goal: Anxiety/Fear Reduction  Outcome: Ongoing (interventions implemented as appropriate)      Problem: Skin Injury Risk (Adult)  Goal: Identify Related Risk Factors and Signs and Symptoms  Outcome: Ongoing (interventions implemented as appropriate)    Goal: Skin Health and Integrity  Outcome: Ongoing (interventions implemented as appropriate)

## 2018-10-11 NOTE — NURSING NOTE
RRT called d/t severe resp distress. Dr Campos at bedside and plan to transfer pt to ccu immediately

## 2018-10-11 NOTE — PROGRESS NOTES
[facility]  Phone: [phone]  Vinny Campos MD    Subjective:  LOS: 3    Acute deterioration this morning  Patient was tried on BiPAP.  However he developed atrial fibrillation with rapid ventricular rate and heart rate into 200s.  He became unstable and hypotensive.  He was brought down to the CCU and intubated.    Objective   Vital Signs past 24hrs    Temp range: Temp (24hrs), Av.4 °F (36.3 °C), Min:95.5 °F (35.3 °C), Max:98.4 °F (36.9 °C)    BP range: BP: ()/() 122/87  Pulse range: Heart Rate:  [] 79  Resp rate range: Resp:  [14-33] 33    Device (Oxygen Therapy): ventilatorFlow (L/min):  [2-4] 3  Oxygen range:SpO2:  [72 %-100 %] 96 %   FiO2 (%):  [40 %-100 %] 40 %  S RR:  [15-20] 15  PA SUP:  [0 cm H20] 0 cm H20  MAP (cm H2O):  [12] 12  52.2 kg (115 lb); Body mass index is 16.04 kg/m².    Intake/Output Summary (Last 24 hours) at 10/11/18 1649  Last data filed at 10/11/18 0548   Gross per 24 hour   Intake                0 ml   Output              350 ml   Net             -350 ml       Physical Exam   Constitutional: He is sedated, intubated and restrained.   Eyes: Pupils are equal, round, and reactive to light. Conjunctivae are normal. No scleral icterus.   Cardiovascular: Normal heart sounds.  An irregular rhythm present. Tachycardia present.    No murmur heard.  Pulmonary/Chest: He is intubated. He is in respiratory distress. He has decreased breath sounds. He has no rhonchi. He has no rales.   Abdominal: Soft. Bowel sounds are normal. He exhibits no mass. There is no tenderness.   Musculoskeletal: He exhibits no edema.   Skin: Skin is warm and dry.   Nursing note and vitals reviewed.    Results Review:    I have reviewed the laboratory and imaging data since the last note by [facility] physician.  My annotations are noted in assessment and plan.    Medication Review:  I have reviewed the current MAR.  My annotations are noted in assessment and plan.      amiodarone 1 mg/min Last  Rate: 1 mg/min (10/11/18 1125)   Followed by     amiodarone 0.5 mg/min    norepinephrine 0.02-0.3 mcg/kg/min Last Rate: 0.3 mcg/kg/min (10/11/18 1100)   phenylephrine 0.5-3 mcg/kg/min Last Rate: 0.5 mcg/kg/min (10/11/18 1626)   propofol 5-50 mcg/kg/min Last Rate: 25 mcg/kg/min (10/11/18 1626)     Plan   PCCM Problems  Acute respiratory failure, vent 10/11/18  Shock, multifactorial  Atrial fibrillation with rapid ventricular rate  Left lower lobe pneumonia  Right middle lobe atelectasis  Acute respiratory failure, hypoxia  COPD cachexia  Severe COPD with exacerbation  Current tobacco use  Current alcohol use  Confusion    Plan of Treatment  Patient had to be emergently intubated.  Ventilator settings were adjusted after ABG reviewed.  Repeat cultures were sent.    Shock is multifactorial.  Continue pressors.    Upon initial arrival to the unit patient's heart rate was in the 200s.  Despite intubation and mechanical ventilation his heart rate stayed high.  He was hooked up to the pads.  As his blood pressure was low we contemplated cardioversion.Discussed with cardiology regarding management of A. fib.  Patient received adenosine bolus.  Underlying rhythm is irregular.  Now on an amiodarone drip.  Additional digoxin was given with improvement in heart rate.    Bronchoscopy completed given the acuity of change in respiratory status.  There was some mild purulent secretions on the left side.  Unimpressive as far as mucous plugs goes.  Continue antibiotics for respiratory infection.    Patient was started on IV steroids yesterday.  These will be continued.    I spoke with the wife at the bedside and explained medical condition to her.    For now remains full code, if his condition deteriorates may need to discuss further with family members.    I spent +75 mins critical care time in care of this patient outside of any procedures.     Vinny Campos MD  10/11/18  4:49 PM    Part of this note may be an electronic  transcription/translation of spoken language to printed text using the Dragon Dictation System.

## 2018-10-11 NOTE — SIGNIFICANT NOTE
10/11/18 1052   Rehab Treatment   Discipline physical therapist   Reason Treatment Not Performed (Pt transferred to CCU, plan to attempt PT 10/12.  )   Recommendation   PT - Next Appointment 10/12/18

## 2018-10-11 NOTE — CONSULTS
Patient Name: Parth Woody  :1940  78 y.o.    Date of Admission: 10/8/2018  Date of Consultation:  10/11/18  Encounter Provider: James Dewitt MD  Place of Service: [facility] CARDIOLOGY  Referring Provider: Dino Majano*  Patient Care Team:  Jaime Chung MD as PCP - General  Jaime Chung MD as PCP - Family Medicine  Loveless, Gerardo Cardoza MD as PCP - Claims Attributed      Chief complaint:  SOA, Cough       History of Present Illness:  79 y/o male with with a medical history of COPD and tobacco use that was last seen by Dr. Hilario on 2015.     10/8/2018-patient presented to the ED with increased shortness of air of about 3 months duration that got dramatically worse the last 2-3 days.  She currently is still a smoker of one to one and a half packs per day ×60 years.  Patient is also developed a productive cough of yellow sputum and a low-grade fever.  Chest x-ray and lab work revealed left lower lobe pneumonia, was admitted for pneumonia and treated accordingly with antibiotics    10/11/2018-patient gradually developed more shortness of air, became more irritable and confused, with developing increased heart rate of 180-160s - Afib RVR. Rapid response team was called and patient was transferred to CCU for respiratory support-intubation and cardiac management.  Currently patient is intubated on the ventilator, no acute distress.  Patient was given adenosine ×1 with underlying rhythm being irregular and was placed on amiodarone and dig was given with improvement in patient's heart rate    Family at bedside states that he is short of air constantly without any type of exertion noted.  Family stated that he does deny chest pain or any pressure sensation, nausea/vomiting.           10/11/2018 CXR1vw  FINDINGS: An endotracheal tube is in place in satisfactory position midway between the thoracic inlet and bonifacio. A right internal jugular central line is in  place superimposed over the superior vena cava. No obvious pneumothorax is identified on the supine examination. The heart s within normal limits in size. There is interstitial prominence and  mild vascular prominence in the perihilar regions bilaterally nonspecific. This may be secondary to mild congestive changes. There is atelectasis/infiltrate present at the left lung base. There is no evidence of consolidation or of gross effusion on this supine  examination.     10/10/2018  CT Head FINDINGS:  There are no abnormal areas of increased density or mass effect. Generalized atrophy. There are extensive scattered areas of decreased density diffusely in the white matter likely related to advanced chronic ischemic gliotic changes. There is a more confluent focus of encephalomalacia in the inferior left frontal region which by CT appears chronic.  Ventricles, sulci, and cisterns appear normal. Bone window images are unremarkable.    IMPRESSION:  1. No acute intracranial abnormality.         10/8/2015 CTA Chest IMPRESSION:      1. No pulmonary embolism.  2. Left lower lobe pneumonia, follow-up recommended. Extensive  emphysematous and chronic changes of the lungs.            Previous Results  8/21/2015 ECHO       8/21/2015 Stress PET Scan       Past Medical History:   Diagnosis Date   • COPD (chronic obstructive pulmonary disease) (CMS/[facility]ly [facility])        Past Surgical History:   Procedure Laterality Date   • CERVICAL SPINE SURGERY     • PROSTATECTOMY           Prior to Admission medications    Medication Sig Start Date End Date Taking? Authorizing Provider   acetaminophen (TYLENOL) 500 MG tablet Take 500 mg by mouth Every 6 (Six) Hours As Needed for Mild Pain .   Yes Jacques Pappas MD   diphenhydrAMINE (BENADRYL) 25 mg capsule Take 25 mg by mouth Every Morning.   Yes Jacques Pappas MD       No Known Allergies    Social History     Social History   • Marital status:      Social History Main Topics   •  Smoking status: Current Every Day Smoker     Packs/day: 1.00     Types: Cigarettes   • Alcohol use Yes      Comment: regular   • Drug use: No     Other Topics Concern   • Not on file       History reviewed. No pertinent family history.    REVIEW OF SYSTEMS:   All systems reviewed.  Pertinent positives identified in HPI.  All other systems are negative.      Objective:     Vitals:    10/11/18 2132 10/11/18 2147 10/11/18 2202 10/11/18 2217   BP: (!) 89/59 93/61 102/76 (!) 89/59   BP Location:       Patient Position:       Pulse: 64 64 70 61   Resp:       Temp:       TempSrc:       SpO2: 100% 100% 100% 99%   Weight:       Height:         Body mass index is 16.04 kg/m².    General Appearance:  Intubated, sedated   Head:    Normocephalic, without obvious abnormality, atraumatic   Eyes:            Lids and lashes normal, conjunctivae and sclerae normal, no   icterus, no pallor, corneas clear, PERRLA   Ears:    Ears appear intact with no abnormalities noted   Throat:   No oral lesions, no thrush, oral mucosa moist   Neck:   No adenopathy, supple, trachea midline, no thyromegaly, no   carotid bruit, no JVD   Back:     No kyphosis present, no scoliosis present, no skin lesions, erythema or scars, no tenderness to percussion or palpation, range of motion normal   Lungs:     Clear to auscultation,respirations regular, even and unlabored    Heart:  Irregular, tachy   Chest Wall:    No abnormalities observed   Abdomen:     Normal bowel sounds, no masses, no organomegaly, soft        non-tender, non-distended, no guarding, no rebound  tenderness   Extremities:   Moves all extremities well, no edema, no cyanosis, no redness   Pulses:   Pulses palpable and equal bilaterally. Normal radial, carotid, femoral, dorsalis pedis and posterior tibial pulses bilaterally. Normal abdominal aorta   Skin:  Psychiatric:   No bleeding, bruising or rash   unable to assess   Lab Review:       Results from last 7 days  Lab Units 10/11/18  5047    SODIUM mmol/L 139   POTASSIUM mmol/L 4.7   CHLORIDE mmol/L 105   CO2 mmol/L 24.6   BUN mg/dL 30*   CREATININE mg/dL 0.82   CALCIUM mg/dL 8.3*   BILIRUBIN mg/dL 0.2   ALK PHOS U/L 71   ALT (SGPT) U/L 123*   AST (SGOT) U/L 123*   GLUCOSE mg/dL 172*       Results from last 7 days  Lab Units 10/11/18  1057 10/08/18  1701   TROPONIN T ng/mL 0.193* <0.010       Results from last 7 days  Lab Units 10/11/18  1815   WBC 10*3/mm3 14.76*   HEMOGLOBIN g/dL 13.5*   HEMATOCRIT % 41.5   PLATELETS 10*3/mm3 270       Results from last 7 days  Lab Units 10/11/18  1057   INR  1.12*   APTT seconds 28.2       Results from last 7 days  Lab Units 10/11/18  1815   MAGNESIUM mg/dL 2.3                 10/11/2018  @ 1012        10/11/2018 @ 1011            10/08/2018             I personally viewed and interpreted the patient's EKG/Telemetry data.        Assessment and Plan:       78 year old man with history of COPD.  He presents with AF with very rapid response (HR>200) associated with shock.  We confirmed rhythm with adenosine, then started IV amiodarone, with improved rate control and improved BP.    I would consider emergent cardioversion only if he is persistently hypotensive despite medical management.    James Dewitt MD  10/11/18  10:27 PM

## 2018-10-11 NOTE — NURSING NOTE
Pt arrived from floor in severe resp distress on BiPAP 's. Skin mottled from feet to upper chest.. Dr azar notified and at bedside for eval. BP low. Plan to intubate pt.

## 2018-10-11 NOTE — PROCEDURES
Indication: impending respiratory failure    After sedation with Propofol, cords were directly visualized with GlideScope. Thereafter endotracheal tube size 8 was placed through the cords. Position was confirmed with bilateral air entry and change of color on capnometer. There wer no complications of procedure.    Ventilator settings given to respiratory therapist. Stat portable chest x-ray has been requested and will be reviewed.

## 2018-10-12 NOTE — PLAN OF CARE
Problem: Ventilation, Mechanical Invasive (Adult)  Goal: Signs and Symptoms of Listed Potential Problems Will be Absent, Minimized or Managed (Ventilation, Mechanical Invasive)  Outcome: Ongoing (interventions implemented as appropriate)      Problem: Pain, Acute (Adult)  Goal: Identify Related Risk Factors and Signs and Symptoms  Outcome: Ongoing (interventions implemented as appropriate)    Goal: Acceptable Pain Control/Comfort Level  Outcome: Ongoing (interventions implemented as appropriate)

## 2018-10-12 NOTE — PLAN OF CARE
Problem: Patient Care Overview  Goal: Plan of Care Review  Outcome: Ongoing (interventions implemented as appropriate)   10/12/18 0130 10/12/18 2341   Coping/Psychosocial   Plan of Care Reviewed With patient --    OTHER   Outcome Summary --  Pt remains in CCU on vent. Propofol, Amio, & Levo gtts cont'd. Levo weaned slightly. PRN Fent x2 for pain. RR remains in 30s, HR in 50s. Taylor cath inserted for acute retention. Urine output 350mL this shift.  Temperature labile (see vitals charting). CTM.    Plan of Care Review   Progress --  no change

## 2018-10-12 NOTE — PLAN OF CARE
Problem: Pneumonia (Adult)  Goal: Signs and Symptoms of Listed Potential Problems Will be Absent, Minimized or Managed (Pneumonia)  Outcome: Ongoing (interventions implemented as appropriate)      Problem: Chronic Obstructive Pulmonary Disease (Adult)  Goal: Signs and Symptoms of Listed Potential Problems Will be Absent, Minimized or Managed (Chronic Obstructive Pulmonary Disease)  Outcome: Ongoing (interventions implemented as appropriate)      Problem: Nutrition, Imbalanced: Inadequate Oral Intake (Adult)  Goal: Identify Related Risk Factors and Signs and Symptoms  Outcome: Ongoing (interventions implemented as appropriate)    Goal: Improved Oral Intake  Outcome: Ongoing (interventions implemented as appropriate)    Goal: Prevent Further Weight Loss  Outcome: Ongoing (interventions implemented as appropriate)      Problem: Fall Risk (Adult)  Goal: Identify Related Risk Factors and Signs and Symptoms  Outcome: Ongoing (interventions implemented as appropriate)    Goal: Absence of Fall  Outcome: Ongoing (interventions implemented as appropriate)      Problem: Skin Injury Risk (Adult)  Goal: Identify Related Risk Factors and Signs and Symptoms  Outcome: Ongoing (interventions implemented as appropriate)    Goal: Skin Health and Integrity  Outcome: Ongoing (interventions implemented as appropriate)      Problem: Restraint, Nonbehavioral (Nonviolent)  Goal: Rationale and Justification  Outcome: Ongoing (interventions implemented as appropriate)    Goal: Nonbehavioral (Nonviolent) Restraint: Absence of Injury/Harm  Outcome: Ongoing (interventions implemented as appropriate)    Goal: Nonbehavioral (Nonviolent) Restraint: Achievement of Discontinuation Criteria  Outcome: Ongoing (interventions implemented as appropriate)    Goal: Nonbehavioral (Nonviolent) Restraint: Preservation of Dignity and Wellbeing  Outcome: Ongoing (interventions implemented as appropriate)

## 2018-10-12 NOTE — PROGRESS NOTES
[facility]  Phone: [phone]  Vinny Campos MD    Subjective:  LOS: 4    Patient is awake despite propofol.  His weaning parameters were reviewed.  He is not in pain.  He is not in distress.    Objective   Vital Signs past 24hrs    Temp range: Temp (24hrs), Av.4 °F (36.9 °C), Min:98.4 °F (36.9 °C), Max:98.4 °F (36.9 °C)    BP range: BP: ()/(48-91) 141/91  Pulse range: Heart Rate:  [] 100  Resp rate range: Resp:  [17-32] 22    Device (Oxygen Therapy): nasal cannulaFlow (L/min):  [4] 4  Oxygen range:SpO2:  [91 %-100 %] 91 %   FiO2 (%):  [30 %-40 %] 30 %  S RR:  [8-15] 8  PEEP/CPAP (cm H2O):  [5 cm H20-7 cm H20] 5 cm H20  IL SUP:  [0 cm H20] 0 cm H20  MAP (cm H2O):  [10-13] 10  52.2 kg (115 lb); Body mass index is 16.04 kg/m².    Intake/Output Summary (Last 24 hours) at 10/12/18 1513  Last data filed at 10/12/18 1400   Gross per 24 hour   Intake           1970.4 ml   Output              629 ml   Net           1341.4 ml       Physical Exam   Constitutional: He is intubated and restrained.   Eyes: Pupils are equal, round, and reactive to light. Conjunctivae are normal. No scleral icterus.   Cardiovascular: Normal rate, regular rhythm and normal heart sounds.    No murmur heard.  Pulmonary/Chest: He is intubated. He has decreased breath sounds. He has no rhonchi. He has no rales.   Abdominal: Soft. Bowel sounds are normal. He exhibits no mass. There is no tenderness.   Musculoskeletal: He exhibits no edema.   Skin: Skin is warm and dry.   Nursing note and vitals reviewed.    Results Review:    I have reviewed the laboratory and imaging data since the last note by [facility] physician.  My annotations are noted in assessment and plan.    Medication Review:  I have reviewed the current MAR.  My annotations are noted in assessment and plan.      norepinephrine 0.02-0.3 mcg/kg/min Last Rate: Stopped (10/12/18 7036)   phenylephrine 0.5-3 mcg/kg/min Last Rate: Stopped (10/11/18 4919)   propofol 5-50  mcg/kg/min Last Rate: Stopped (10/12/18 1100)     Plan   PCCM Problems  Acute respiratory failure, vent 10/11/18  Shock, multifactorial  Cardiomyopathy - EF 10%  Atrial fibrillation with rapid ventricular rate  Left lower lobe pneumonia  Right middle lobe atelectasis  Acute respiratory failure, hypoxia  COPD cachexia  Severe COPD with exacerbation  Current tobacco use  Current alcohol use  Confusion    Plan of Treatment  I reviewed his weaning parameters.  Patient was successfully extubated after examination this morning.    Shock is improved and now off pressors.    EF is 10%.  Await further cardiology input.    A. fib has reverted to normal sinus rhythm.  Rate is controlled.  Remains on an amiodarone drip for now.    Remains on antibiotics for pneumonia.  Await cultures.  He needs follow-up imaging.    He is on IV steroids for possible COPD exacerbation.  I will plan on tapering these over the next few days.    I spoke with the wife at the bedside and updated her.    For now remains full code, if his condition deteriorates may need to discuss further with family members.    I spent +35 mins critical care time in care of this patient outside of any procedures.     Vinny Campos MD  10/12/18  3:13 PM    Part of this note may be an electronic transcription/translation of spoken language to printed text using the Dragon Dictation System.

## 2018-10-12 NOTE — CONSULTS
Adult Nutrition  Assessment/PES    Patient Name:  Parth Woody  YOB: 1940  MRN: [id number]  Admit Date:  10/8/2018    Assessment Date:  10/12/2018    Comments:  Nutrition follow up completed.  Pt now extubated. Will follow for restart of nutrition and offer appropriate supplements/snacks.          Reason for Assessment     Row Name 10/12/18 1337          Reason for Assessment    Reason For Assessment follow-up protocol     Diagnosis --   respiratory failure just extubated             Nutrition/Diet History     Row Name 10/12/18 1338          Nutrition/Diet History    Typical Food/Fluid Intake extubated               Labs/Tests/Procedures/Meds     Row Name 10/12/18 1338          Labs/Procedures/Meds    Lab Results Reviewed reviewed     Lab Results Comments wbc, glu        Diagnostic Tests/Procedures    Diagnostic Test/Procedure Reviewed reviewed        Medications    Pertinent Medications Reviewed reviewed             Physical Findings     Row Name 10/12/18 1338          Physical Findings    Overall Physical Appearance on oxygen therapy   just extubated     Skin --   magnus 13, intact               Nutrition Prescription Ordered     Row Name 10/12/18 1339          Nutrition Prescription PO    Current PO Diet NPO               Problem/Interventions:                  Intervention Goal     Row Name 10/12/18 1340          Intervention Goal    General Maintain nutrition     PO Initiate feeding     Weight Appropriate weight gain             Nutrition Intervention     Row Name 10/12/18 1340          Nutrition Intervention    RD/Tech Action Follow Tx progress;Care plan reviewd;Await begin PO               Education/Evaluation     Row Name 10/12/18 1341          Monitor/Evaluation    Monitor Per protocol;Pertinent labs;Weight         Electronically signed by:  Mine Arora RD  10/12/18 1:41 PM

## 2018-10-13 PROBLEM — I42.9 CARDIOMYOPATHY (HCC): Status: ACTIVE | Noted: 2018-01-01

## 2018-10-13 PROBLEM — I46.9 CARDIAC ARREST (HCC): Status: ACTIVE | Noted: 2018-01-01

## 2018-10-13 PROBLEM — I49.01 VENTRICULAR FIBRILLATION (HCC): Status: ACTIVE | Noted: 2018-01-01

## 2018-10-13 NOTE — PROGRESS NOTES
"DAILY PROGRESS NOTE  [facility]    Patient Identification:  Name: Parth Woody  Age: 78 y.o.  Sex: male  :  1940  MRN: [id number]         Primary Care Physician: Jaime Chung MD    Subjective:  Interval History:Patient sedated on vent. Code cart parked outside of his room. Events noted.       Objective:    Scheduled Meds:    acetylcysteine 4 mL Nebulization Q8H - RT   albuterol 6 puff Inhalation Q4H - RT   ceftriaxone 1 g Intravenous Q24H   doxycycline 100 mg Intravenous Q12H   enoxaparin 30 mg Subcutaneous Q24H   famotidine 20 mg Intravenous Q12H   insulin aspart 0-9 Units Subcutaneous Q6H   methylPREDNISolone sodium succinate 40 mg Intravenous Q12H   metoprolol tartrate 5 mg Intravenous Q4H   nicotine 1 patch Transdermal Q24H     Continuous Infusions:    amiodarone 1 mg/min Last Rate: 1 mg/min (10/13/18 0136)   amiodarone 0.5 mg/min Last Rate: 0.5 mg/min (10/13/18 1104)   lidocaine cardiac 0.5 mg/min Last Rate: 0.5 mg/min (10/13/18 0400)   norepinephrine 0.02-0.3 mcg/kg/min Last Rate: 0.14 mcg/kg/min (10/13/18 0552)   propofol 5-50 mcg/kg/min Last Rate: 30 mcg/kg/min (10/13/18 1103)       Vital signs in last 24 hours:  Temp:  [98.4 °F (36.9 °C)-98.8 °F (37.1 °C)] 98.8 °F (37.1 °C)  Heart Rate:  [] 49  Resp:  [12-34] 14  BP: ()/() 87/64  FiO2 (%):  [40 %-60 %] 50 %    Intake/Output:    Intake/Output Summary (Last 24 hours) at 10/13/18 1129  Last data filed at 10/13/18 0400   Gross per 24 hour   Intake           648.98 ml   Output             2785 ml   Net         -2136.02 ml       Exam:  BP (!) 87/64   Pulse (!) 49   Temp 98.8 °F (37.1 °C)   Resp 14   Ht 180.3 cm (71\")   Wt 52.2 kg (115 lb)   SpO2 98%   BMI 16.04 kg/m²     General Appearance:    Sedated on vent, no distress   Head:    Normocephalic, without obvious abnormality, atraumatic   Eyes:       Throat:  intubated   Neck:   Supple, symmetrical, trachea midline, no JVD   Lungs:     Clear to auscultation " bilaterally, respirations unlabored   Chest Wall:    No tenderness or deformity    Heart:    Regular rate and rhythm, S1 and S2 normal, no murmur,no  Rub or gallop   Abdomen:     Soft, non-tender, bowel sounds active, no masses, no organomegaly    Extremities:   Extremities normal, atraumatic, no cyanosis or edema   Pulses:      Skin:   Skin is warm and dry,  no rashes or palpable lesions   Neurologic:   Sedated on vent      Lab Results (last 72 hours)     Procedure Component Value Units Date/Time    BAL Culture, Quantitative - Lavage, Lung, R [[id number]]  (Normal) Collected:  10/11/18 1222    Specimen:  Lavage from Lung, R Updated:  10/13/18 0741     BAL Culture No growth at 2 days     Gram Stain Result No epithelial cells seen      Many (4+) WBCs seen      No organisms seen    BNP [[id number]]  (Abnormal) Collected:  10/13/18 0445    Specimen:  Blood Updated:  10/13/18 0544     proBNP 29,181.0 (H) pg/mL     Narrative:       Among patients with dyspnea, NT-proBNP is highly sensitive for the detection of acute congestive heart failure. In addition NT-proBNP of <300 pg/ml effectively rules out acute congestive heart failure with 99% negative predictive value.    POC Glucose Once [[id number]]  (Normal) Collected:  10/13/18 0536    Specimen:  Blood Updated:  10/13/18 0538     Glucose 104 mg/dL     Narrative:       Meter: [id number] : [id number] Renettakira Phillips DEEPTHI    Troponin [[id number]]  (Abnormal) Collected:  10/13/18 0445    Specimen:  Blood Updated:  10/13/18 0535     Troponin T 0.065 (H) ng/mL     Narrative:       Troponin T Reference Ranges:  Less than 0.03 ng/mL:    Negative for AMI  0.03 to 0.09 ng/mL:      Indeterminant for AMI  Greater than 0.09 ng/mL: Positive for AMI    Comprehensive Metabolic Panel [[id number]]  (Abnormal) Collected:  10/13/18 0445    Specimen:  Blood Updated:  10/13/18 0534     Glucose 105 (H) mg/dL      BUN 34 (H) mg/dL      Creatinine 0.61 (L) mg/dL      Sodium 143 mmol/L       Potassium 3.8 mmol/L      Chloride 101 mmol/L      CO2 29.4 (H) mmol/L      Calcium 8.6 mg/dL      Total Protein 5.8 (L) g/dL      Albumin 3.20 (L) g/dL      ALT (SGPT) 85 (H) U/L      AST (SGOT) 42 (H) U/L      Alkaline Phosphatase 66 U/L      Total Bilirubin <0.2 mg/dL      eGFR Non African Amer 128 mL/min/1.73      Globulin 2.6 gm/dL      A/G Ratio 1.2 g/dL      BUN/Creatinine Ratio 55.7 (H)     Anion Gap 12.6 mmol/L     Narrative:       The MDRD GFR formula is only valid for adults with stable renal function between ages 18 and 70.    Magnesium [[id number]]  (Normal) Collected:  10/13/18 0445    Specimen:  Blood Updated:  10/13/18 0530     Magnesium 2.3 mg/dL     CBC (No Diff) [[id number]]  (Abnormal) Collected:  10/13/18 0445    Specimen:  Blood Updated:  10/13/18 0515     WBC 15.65 (H) 10*3/mm3      RBC 4.93 10*6/mm3      Hemoglobin 14.0 g/dL      Hematocrit 44.4 %      MCV 90.1 fL      MCH 28.4 pg      MCHC 31.5 (L) g/dL      RDW 15.0 (H) %      RDW-SD 49.0 fl      MPV 11.0 fL      Platelets 252 10*3/mm3     Blood Gas, Arterial [[id number]]  (Abnormal) Collected:  10/13/18 0408    Specimen:  Arterial Blood Updated:  10/13/18 0410     Site Arterial: right radial     Richi's Test N/A     pH, Arterial 7.423 pH units      pCO2, Arterial 48.3 (H) mm Hg      pO2, Arterial 83.7 mm Hg      HCO3, Arterial 31.5 (H) mmol/L      Base Excess, Arterial 5.9 (H) mmol/L      O2 Saturation Calculated 96.3 %      A-a Gradiant 0.2 mmHg      Barometric Pressure for Blood Gas 754.6 mmHg      Modality Adult Vent     FIO2 60 %      Ventilator Mode PC     Set [facility] Resp Rate 14     Rate 23 Breaths/minute      PEEP 5    Narrative:       IP 15 Meter: [id number] : [id number] Chad Nice    POC Glucose Once [[id number]]  (Abnormal) Collected:  10/13/18 0008    Specimen:  Blood Updated:  10/13/18 0026     Glucose 246 (H) mg/dL     Narrative:       Meter: [id number] : [id number] Renetta LACEY    Troponin [[id number]]   (Abnormal) Collected:  10/12/18 2041    Specimen:  Blood Updated:  10/12/18 2133     Troponin T 0.071 (H) ng/mL     Narrative:       Troponin T Reference Ranges:  Less than 0.03 ng/mL:    Negative for AMI  0.03 to 0.09 ng/mL:      Indeterminant for AMI  Greater than 0.09 ng/mL: Positive for AMI    Comprehensive Metabolic Panel [[id number]]  (Abnormal) Collected:  10/12/18 2041    Specimen:  Blood Updated:  10/12/18 2119     Glucose 166 (H) mg/dL      BUN 33 (H) mg/dL      Creatinine 0.73 (L) mg/dL      Sodium 142 mmol/L      Potassium 4.2 mmol/L      Chloride 101 mmol/L      CO2 26.9 mmol/L      Calcium 9.2 mg/dL      Total Protein 6.6 g/dL      Albumin 3.50 g/dL      ALT (SGPT) 96 (H) U/L      AST (SGOT) 52 (H) U/L      Alkaline Phosphatase 77 U/L      Total Bilirubin 0.2 mg/dL      eGFR Non African Amer 104 mL/min/1.73      Globulin 3.1 gm/dL      A/G Ratio 1.1 g/dL      BUN/Creatinine Ratio 45.2 (H)     Anion Gap 14.1 mmol/L     Narrative:       The MDRD GFR formula is only valid for adults with stable renal function between ages 18 and 70.    Magnesium [[id number]]  (Normal) Collected:  10/12/18 2041    Specimen:  Blood Updated:  10/12/18 2117     Magnesium 2.4 mg/dL     POC Glucose Once [[id number]]  (Abnormal) Collected:  10/12/18 2051    Specimen:  Blood Updated:  10/12/18 2053     Glucose 186 (H) mg/dL     Narrative:       Meter: [id number] : [id number] Renetta LACEY    Blood Culture - Blood, [[id number]]  (Normal) Collected:  10/08/18 1849    Specimen:  Blood from Arm, Right Updated:  10/12/18 1900     Blood Culture No growth at 4 days    Blood Culture - Blood, [[id number]]  (Normal) Collected:  10/08/18 1709    Specimen:  Blood from Arm, Left Updated:  10/12/18 1715     Blood Culture No growth at 4 days    POC Glucose Once [[id number]]  (Normal) Collected:  10/12/18 1634    Specimen:  Blood Updated:  10/12/18 1637     Glucose 117 mg/dL     Narrative:       Meter: [id number] : [id number] Tran  Clarissa DEEPTHI    Blood Culture - Blood, Blood, Venous Line [[id number]]  (Normal) Collected:  10/11/18 1403    Specimen:  Blood from Arm, Left Updated:  10/12/18 1431     Blood Culture No growth at 24 hours    Blood Culture - Blood, Blood, Central Line [[id number]]  (Normal) Collected:  10/11/18 1402    Specimen:  Blood from Blood, Central Line Updated:  10/12/18 1431     Blood Culture No growth at 24 hours    AFB Culture - Lavage, Lung, R [[id number]] Collected:  10/11/18 1222    Specimen:  Lavage from Lung, R Updated:  10/12/18 1400     AFB Stain No acid fast bacilli seen on concentrated smear    Blood Gas, Arterial [[id number]]  (Abnormal) Collected:  10/12/18 1239    Specimen:  Arterial Blood Updated:  10/12/18 1244     Site Arterial: right radial     Richi's Test Positive     pH, Arterial 7.404 pH units      pCO2, Arterial 41.3 mm Hg      pO2, Arterial 75.6 (L) mm Hg      HCO3, Arterial 25.8 mmol/L      Base Excess, Arterial 0.9 mmol/L      O2 Saturation Calculated 95.0 %      A-a Gradiant 0.4 mmHg      Barometric Pressure for Blood Gas 753.8 mmHg      Modality Adult Vent     FIO2 30 %      Ventilator Mode PC/PS     Rate 27 Breaths/minute      PEEP 5     PSV 8 cmH2O     Narrative:       PS 8 5 30% Meter: [id number] : [id number] Roscoe Leonard    Non-gynecologic Cytology [[id number]] Collected:  10/11/18 1222    Specimen:  Lavage from Lung, R Updated:  10/12/18 0756    POC Glucose Once [[id number]]  (Abnormal) Collected:  10/12/18 0532    Specimen:  Blood Updated:  10/12/18 0533     Glucose 150 (H) mg/dL     Narrative:       Meter: [id number] : [id number] RenettaFiksuara DEEPTHI    POC Glucose Once [[id number]]  (Abnormal) Collected:  10/11/18 2343    Specimen:  Blood Updated:  10/11/18 2347     Glucose 145 (H) mg/dL     Narrative:       Meter: [id number] : [id number] Renetta Jacqueline DEEPTHI    POC Glucose Once [[id number]]  (Abnormal) Collected:  10/11/18 2009    Specimen:  Blood Updated:  10/11/18 2012     Glucose  162 (H) mg/dL     Narrative:       Meter: [id number] : [id number] Renetta LACEY    Comprehensive Metabolic Panel [[id number]]  (Abnormal) Collected:  10/11/18 1815    Specimen:  Blood Updated:  10/11/18 1927     Glucose 172 (H) mg/dL      BUN 30 (H) mg/dL      Creatinine 0.82 mg/dL      Sodium 139 mmol/L      Potassium 4.7 mmol/L      Chloride 105 mmol/L      CO2 24.6 mmol/L      Calcium 8.3 (L) mg/dL      Total Protein 6.0 g/dL      Albumin 2.80 (L) g/dL      ALT (SGPT) 123 (H) U/L      AST (SGOT) 123 (H) U/L      Alkaline Phosphatase 71 U/L      Total Bilirubin 0.2 mg/dL      eGFR Non African Amer 91 mL/min/1.73      Globulin 3.2 gm/dL      A/G Ratio 0.9 g/dL      BUN/Creatinine Ratio 36.6 (H)     Anion Gap 9.4 mmol/L     Narrative:       The MDRD GFR formula is only valid for adults with stable renal function between ages 18 and 70.    Magnesium [[id number]]  (Normal) Collected:  10/11/18 1815    Specimen:  Blood Updated:  10/11/18 1902     Magnesium 2.3 mg/dL     Phosphorus [[id number]]  (Normal) Collected:  10/11/18 1815    Specimen:  Blood Updated:  10/11/18 1902     Phosphorus 3.0 mg/dL     CBC & Differential [[id number]] Collected:  10/11/18 1815    Specimen:  Blood Updated:  10/11/18 1847    Narrative:       The following orders were created for panel order CBC & Differential.  Procedure                               Abnormality         Status                     ---------                               -----------         ------                     CBC Auto Differential[[id number]]        Abnormal            Final result                 Please view results for these tests on the individual orders.    CBC Auto Differential [[id number]]  (Abnormal) Collected:  10/11/18 1815    Specimen:  Blood Updated:  10/11/18 1847     WBC 14.76 (H) 10*3/mm3      RBC 4.64 10*6/mm3      Hemoglobin 13.5 (L) g/dL      Hematocrit 41.5 %      MCV 89.4 fL      MCH 29.1 pg      MCHC 32.5 (L) g/dL      RDW 15.5 (H) %       RDW-SD 50.3 fl      MPV 10.9 fL      Platelets 270 10*3/mm3      Neutrophil % 86.3 (H) %      Lymphocyte % 8.9 (L) %      Monocyte % 4.7 (L) %      Eosinophil % 0.0 (L) %      Basophil % 0.1 %      Immature Grans % 0.3 %      Neutrophils, Absolute 12.74 (H) 10*3/mm3      Lymphocytes, Absolute 1.32 10*3/mm3      Monocytes, Absolute 0.69 10*3/mm3      Eosinophils, Absolute 0.00 10*3/mm3      Basophils, Absolute 0.01 10*3/mm3      Immature Grans, Absolute 0.05 (H) 10*3/mm3     POC Glucose Once [[id number]]  (Abnormal) Collected:  10/11/18 1608    Specimen:  Blood Updated:  10/11/18 1609     Glucose 222 (H) mg/dL     Narrative:       Meter: [id number] : [id number] Mal She'Kerry NA    Lactic Acid, Reflex [[id number]]  (Normal) Collected:  10/11/18 1508    Specimen:  Blood Updated:  10/11/18 1533     Lactate 1.7 mmol/L     Lactic Acid, Reflex Timer (This will reflex a repeat order 3-3:15 hours after ordered.) [[id number]] Collected:  10/11/18 1057    Specimen:  Blood Updated:  10/11/18 1431     Extra Tube Hold for add-ons.     Comment: Auto resulted.       Body Fluid Cell Count With Differential - Lavage, Lung, R [[id number]] Collected:  10/11/18 1222    Specimen:  Lavage from Lung, R Updated:  10/11/18 1402    Narrative:       The following orders were created for panel order Body Fluid Cell Count With Differential - Lavage, Lung, R.  Procedure                               Abnormality         Status                     ---------                               -----------         ------                     Body fluid cell count - ...[[id number]]  Abnormal            Final result               Body fluid differential ...[[id number]]                      Final result                 Please view results for these tests on the individual orders.    Body fluid differential - Body Fluid, [[id number]] Collected:  10/11/18 1222    Specimen:  Lavage Updated:  10/11/18 1402     Neutrophils, Fluid 69 %      Lymphocytes, Fluid 22 %       Mononuclear, Fluid 9 %     Body fluid cell count - Body Fluid, [[id number]]  (Abnormal) Collected:  10/11/18 1222    Specimen:  Lavage Updated:  10/11/18 1400     Color, Fluid White     Appearance, Fluid Cloudy (A)     WBC, Fluid 1,280 /mm3      Comment: Estimated count due to clots present in specimen.        RBC, Fluid 26 /mm3      Comment: Estimated count due to clots present in specimen.       Blood Gas, Arterial [[id number]]  (Abnormal) Collected:  10/11/18 1138    Specimen:  Arterial Blood Updated:  10/11/18 1142     Site Arterial: right radial     Richi's Test Positive     pH, Arterial 7.211 (C) pH units      Comment: Critical:Notify Dr holden (11-Oct-18 11:40:53)Read back ok        pCO2, Arterial 55.7 (C) mm Hg      Comment: Critical:Notify Dr holden (11-Oct-18 11:40:53)Read back ok        pO2, Arterial 421.8 (H) mm Hg      HCO3, Arterial 22.3 mmol/L      Base Excess, Arterial -6.2 (L) mmol/L      O2 Saturation Calculated 99.9 (H) %      A-a Gradiant 0.6 mmHg      Barometric Pressure for Blood Gas 751.6 mmHg      Modality Adult Vent     FIO2 100 %      Ventilator Mode AC     Set Mech Resp Rate 16     Rate 18 Breaths/minute      PEEP 5    Narrative:       sats 90. Ip 18 Meter: [id number] : [id number] Qi Santana    Comprehensive Metabolic Panel [[id number]]  (Abnormal) Collected:  10/11/18 1057    Specimen:  Blood Updated:  10/11/18 1139     Glucose 229 (H) mg/dL      BUN 24 (H) mg/dL      Creatinine 0.72 (L) mg/dL      Sodium 140 mmol/L      Potassium 4.4 mmol/L      Chloride 106 mmol/L      CO2 24.0 mmol/L      Calcium 7.5 (L) mg/dL      Total Protein 5.5 (L) g/dL      Albumin 2.90 (L) g/dL      ALT (SGPT) 62 (H) U/L      AST (SGOT) 80 (H) U/L      Alkaline Phosphatase 60 U/L      Total Bilirubin 0.2 mg/dL      eGFR Non African Amer 106 mL/min/1.73      Globulin 2.6 gm/dL      A/G Ratio 1.1 g/dL      BUN/Creatinine Ratio 33.3 (H)     Anion Gap 10.0 mmol/L     Narrative:       The MDRD GFR formula  is only valid for adults with stable renal function between ages 18 and 70.    Troponin [[id number]]  (Abnormal) Collected:  10/11/18 1057    Specimen:  Blood Updated:  10/11/18 1138     Troponin T 0.193 (C) ng/mL     Narrative:       Troponin T Reference Ranges:  Less than 0.03 ng/mL:    Negative for AMI  0.03 to 0.09 ng/mL:      Indeterminant for AMI  Greater than 0.09 ng/mL: Positive for AMI    Magnesium [[id number]]  (Normal) Collected:  10/11/18 1057    Specimen:  Blood Updated:  10/11/18 1134     Magnesium 2.0 mg/dL     aPTT [[id number]]  (Normal) Collected:  10/11/18 1057    Specimen:  Blood Updated:  10/11/18 1133     PTT 28.2 seconds     Protime-INR [[id number]]  (Abnormal) Collected:  10/11/18 1057    Specimen:  Blood Updated:  10/11/18 1133     Protime 14.2 Seconds      INR 1.12 (H)    Lactic Acid, Plasma [[id number]]  (Abnormal) Collected:  10/11/18 1057    Specimen:  Blood Updated:  10/11/18 1131     Lactate 4.2 (C) mmol/L     CBC & Differential [[id number]] Collected:  10/11/18 1057    Specimen:  Blood Updated:  10/11/18 1113    Narrative:       The following orders were created for panel order CBC & Differential.  Procedure                               Abnormality         Status                     ---------                               -----------         ------                     CBC Auto Differential[[id number]]        Abnormal            Final result                 Please view results for these tests on the individual orders.    CBC Auto Differential [[id number]]  (Abnormal) Collected:  10/11/18 1057    Specimen:  Blood Updated:  10/11/18 1113     WBC 14.11 (H) 10*3/mm3      RBC 4.36 (L) 10*6/mm3      Hemoglobin 12.6 (L) g/dL      Hematocrit 39.1 (L) %      MCV 89.7 fL      MCH 28.9 pg      MCHC 32.2 (L) g/dL      RDW 15.1 (H) %      RDW-SD 49.9 fl      MPV 10.5 fL      Platelets 267 10*3/mm3      Neutrophil % 91.3 (H) %      Lymphocyte % 6.8 (L) %      Monocyte % 1.8 (L) %      Eosinophil % 0.0 (L) %       Basophil % 0.1 %      Immature Grans % 0.3 %      Neutrophils, Absolute 12.89 (H) 10*3/mm3      Lymphocytes, Absolute 0.96 10*3/mm3      Monocytes, Absolute 0.25 10*3/mm3      Eosinophils, Absolute 0.00 10*3/mm3      Basophils, Absolute 0.01 10*3/mm3      Immature Grans, Absolute 0.04 (H) 10*3/mm3     Respiratory Culture - Sputum, Cough [[id number]] Collected:  10/08/18 2319    Specimen:  Sputum from Cough Updated:  10/11/18 0701     Respiratory Culture Light growth (2+) Normal Respiratory Bettie     Gram Stain Result Few (2+) Mixed bacterial morphotypes seen on Gram Stain    Blood Gas, Arterial [[id number]]  (Abnormal) Collected:  10/10/18 1125    Specimen:  Arterial Blood Updated:  10/10/18 1132     Site Arterial: right radial     Richi's Test Positive     pH, Arterial 7.414 pH units      pCO2, Arterial 41.1 mm Hg      pO2, Arterial 78.2 (L) mm Hg      HCO3, Arterial 26.3 mmol/L      Base Excess, Arterial 1.5 mmol/L      O2 Saturation Calculated 95.6 %      Barometric Pressure for Blood Gas 748.3 mmHg      Modality Cannula     Flow Rate 3 lpm      Set Mech Resp Rate 32    Narrative:       SPO2 93% Meter: [id number] : [id number] Rebecca Perdomo        Data Review:    Results from last 7 days  Lab Units 10/13/18  0445 10/12/18  2041 10/11/18  1815   SODIUM mmol/L 143 142 139   POTASSIUM mmol/L 3.8 4.2 4.7   CHLORIDE mmol/L 101 101 105   CO2 mmol/L 29.4* 26.9 24.6   BUN mg/dL 34* 33* 30*   CREATININE mg/dL 0.61* 0.73* 0.82   GLUCOSE mg/dL 105* 166* 172*   CALCIUM mg/dL 8.6 9.2 8.3*       Results from last 7 days  Lab Units 10/13/18  0445 10/11/18  1815 10/11/18  1057   WBC 10*3/mm3 15.65* 14.76* 14.11*   HEMOGLOBIN g/dL 14.0 13.5* 12.6*   HEMATOCRIT % 44.4 41.5 39.1*   PLATELETS 10*3/mm3 252 270 267               Lab Results  Lab Value Date/Time   TROPONINT 0.065 (H) 10/13/2018 0445   TROPONINT 0.071 (H) 10/12/2018 2041   TROPONINT 0.193 (C) 10/11/2018 1057   TROPONINT <0.010 10/08/2018 1701           Results  from last 7 days  Lab Units 10/13/18  0445 10/12/18  2041 10/11/18  1815   ALK PHOS U/L 66 77 71   BILIRUBIN mg/dL <0.2 0.2 0.2   ALT (SGPT) U/L 85* 96* 123*   AST (SGOT) U/L 42* 52* 123*             Glucose   Date/Time Value Ref Range Status   10/13/2018 0536 104 70 - 130 mg/dL Final   10/13/2018 0008 246 (H) 70 - 130 mg/dL Final   10/12/2018 2051 186 (H) 70 - 130 mg/dL Final   10/12/2018 1634 117 70 - 130 mg/dL Final   10/12/2018 0532 150 (H) 70 - 130 mg/dL Final   10/11/2018 2343 145 (H) 70 - 130 mg/dL Final   10/11/2018 2009 162 (H) 70 - 130 mg/dL Final   10/11/2018 1608 222 (H) 70 - 130 mg/dL Final       Results from last 7 days  Lab Units 10/11/18  1057   INR  1.12*       Patient Active Problem List   Diagnosis Code   • COPD exacerbation (CMS/HCC) J44.1   • Pneumonia J18.9   • Tobacco abuse Z72.0   • Acute hypoxemic respiratory failure (CMS/HCC) J96.01   • Severe malnutrition due to chronic illness E43   • Sepsis (CMS/HCC) A41.9   • Atelectasis of right lung J98.11   • Cardiac arrest (CMS/HCC) I46.9   • Ventricular fibrillation (CMS/HCC) I49.01   • Cardiomyopathy (CMS/HCC) I42.9       Assessment:  Active Hospital Problems    Diagnosis Date Noted   • **Pneumonia [J18.9] 10/08/2018   • Cardiac arrest (CMS/HCC) [I46.9] 10/13/2018   • Ventricular fibrillation (CMS/HCC) [I49.01] 10/13/2018   • Cardiomyopathy (CMS/HCC) [I42.9] 10/13/2018   • Sepsis (CMS/HCC) [A41.9] 10/10/2018   • Atelectasis of right lung [J98.11] 10/10/2018   • Severe malnutrition due to chronic illness [E43] 10/09/2018   • COPD exacerbation (CMS/HCC) [J44.1] 10/08/2018   • Tobacco abuse [Z72.0] 10/08/2018   • Acute hypoxemic respiratory failure (CMS/HCC) [J96.01] 10/08/2018      Resolved Hospital Problems    Diagnosis Date Noted Date Resolved   No resolved problems to display.       Plan:  Continue ICU support. Antibiotics, Vent support, meds drips as per cardiology and pulmonary. Events noted. [place]???    Aashish Mtz MD  10/13/2018  11:29  AM

## 2018-10-13 NOTE — PROGRESS NOTES
Hospital Follow Up      Chief Complaint: Ventricular fibrillation arrest, afib, cardiomyopathy    Interval History:  Currently hemodynamically stable on lidocaine and amiodarone.  No further ventricular fibrillation overnight.  Required re-intubation due to worsening respiratory failure overnight.    Objective:     Objective:  Temp:  [98.4 °F (36.9 °C)-98.8 °F (37.1 °C)] 98.8 °F (37.1 °C)  Heart Rate:  [] 50  Resp:  [12-34] 14  BP: ()/() 87/64  FiO2 (%):  [40 %-60 %] 60 %     Intake/Output Summary (Last 24 hours) at 10/13/18 0952  Last data filed at 10/13/18 0400   Gross per 24 hour   Intake           740.98 ml   Output             2822 ml   Net         -2081.02 ml     Body mass index is 16.04 kg/m².  1    10/08/18  1746 10/09/18  0500 10/11/18  1732   Weight: 54 kg (119 lb) 52.2 kg (115 lb) 52.2 kg (115 lb)     Weight change:       Physical Exam:   General : Alert, cooperative, in no acute distress.  Neuro: alert,cooperative and oriented  Lungs: CTAB. Normal respiratory effort and rate.  CV:: Regular rate and rhythm, normal S1 and S2, no murmurs, gallops or rubs.  ABD: Soft, nontender, non-distended. positive bowel sounds  Extr: No edema or cyanosis, moves all extremities    Lab Review:     Results from last 7 days  Lab Units 10/13/18  0445 10/12/18  2041   SODIUM mmol/L 143 142   POTASSIUM mmol/L 3.8 4.2   CHLORIDE mmol/L 101 101   CO2 mmol/L 29.4* 26.9   BUN mg/dL 34* 33*   CREATININE mg/dL 0.61* 0.73*   GLUCOSE mg/dL 105* 166*   CALCIUM mg/dL 8.6 9.2   AST (SGOT) U/L 42* 52*   ALT (SGPT) U/L 85* 96*       Results from last 7 days  Lab Units 10/13/18  0445 10/12/18  2041 10/11/18  1057 10/08/18  1701   TROPONIN T ng/mL 0.065* 0.071* 0.193* <0.010       Results from last 7 days  Lab Units 10/13/18  0445 10/11/18  1815   WBC 10*3/mm3 15.65* 14.76*   HEMOGLOBIN g/dL 14.0 13.5*   HEMATOCRIT % 44.4 41.5   PLATELETS 10*3/mm3 252 270       Results from last 7 days  Lab Units 10/11/18  1057   INR   1.12*   APTT seconds 28.2       Results from last 7 days  Lab Units 10/13/18  0445 10/12/18  2041   MAGNESIUM mg/dL 2.3 2.4           Invalid input(s): LDLCALC    Results from last 7 days  Lab Units 10/13/18  0445 10/08/18  1701   PROBNP pg/mL 29,181.0* 536.5         I reviewed the patient's new clinical results.  I personally viewed and interpreted the patient's EKG  Current Medications:   Scheduled Meds:  acetylcysteine 4 mL Nebulization Q8H - RT   albuterol 6 puff Inhalation Q4H - RT   ceftriaxone 1 g Intravenous Q24H   doxycycline 100 mg Intravenous Q12H   enoxaparin 30 mg Subcutaneous Q24H   famotidine 20 mg Intravenous Q12H   insulin aspart 0-9 Units Subcutaneous Q6H   methylPREDNISolone sodium succinate 40 mg Intravenous Q12H   metoprolol tartrate 5 mg Intravenous Q4H   nicotine 1 patch Transdermal Q24H     Continuous Infusions:  amiodarone 1 mg/min Last Rate: 1 mg/min (10/13/18 0136)   amiodarone 0.5 mg/min Last Rate: 0.5 mg/min (10/13/18 0334)   lidocaine cardiac 0.5 mg/min Last Rate: 0.5 mg/min (10/13/18 0400)   norepinephrine 0.02-0.3 mcg/kg/min Last Rate: 0.14 mcg/kg/min (10/13/18 3452)   propofol 5-50 mcg/kg/min Last Rate: 20 mcg/kg/min (10/13/18 7652)       Allergies:  No Known Allergies    Assessment/Plan:     1. Recurrent ventricular fibrillation arrest.  Troponin in indeterminate range but trending down.  Likely from recurrent arrest.  Suspect vfib from cardiomyopathy but can not rule out ischemia.  Not a good candidate for cardiac catheterization at this point.   2. Cardiomypathy. EF of 10%.  May be tachycardia mediated but may need ischemic work up at some point depending on clinical course.   3. Atrial fibrillation.  Currently in sinus rhythm on amiodarone.   4. Acute hypoxic respiratory failure. Re-intubated overnight.   5. Shock.  On levophed.   6. Left lower lobe pneumonia  7. Severe COPD  8. Tobacco use  9. Etoh abuse    -  Continue current management with amiodarone and lidocaine today.   -   Wean levophed as tolerates  -  His overall prognosis appears poor      Spring Martin MD  10/13/18  9:52 AM

## 2018-10-13 NOTE — PROGRESS NOTES
"[facility] PULMONARY CARE         Dr Maldonado Sayied   LOS: 5 days   Patient Care Team:  Jaime Chung MD as PCP - General  Jaime Chung MD as PCP - Family Medicine  [facility], Gerardo Cardoza MD as PCP - Claims Attributed    Chief Complaint: Status post CODE BLUE and reintubated with acute respiratory failure multifactorial shock cardiomyopathy EF 10% with left lower lobe pneumonia and end-stage COPD/cachexia. complicated by tobacco abuse and alcohol abuse.    Interval History: Events noted chart reviewed.  Reintubated last night.  Currently sedated intubated.  Tolerating went well at this time.  No distress on the vent currently.    REVIEW OF SYSTEMS:   Sedated intubated.    Ventilator/Non-Invasive Ventilation Settings     Start     Ordered    10/11/18 0925  NIPPV-IPPV / BIPAP / CPAP  At Bedtime & As Needed-RT,   Status:  Canceled     Question Answer Comment   Type: BIPAP    NIPPV Mask Interface Per Patient Preference    Tidal Volume 400        10/11/18 0925            Vital Signs  Temp:  [98.4 °F (36.9 °C)-98.8 °F (37.1 °C)] 98.8 °F (37.1 °C)  Heart Rate:  [] 50  Resp:  [12-34] 14  BP: ()/() 87/64  FiO2 (%):  [40 %-60 %] 60 %    Intake/Output Summary (Last 24 hours) at 10/13/18 0955  Last data filed at 10/13/18 0400   Gross per 24 hour   Intake           740.98 ml   Output             2822 ml   Net         -2081.02 ml     Flowsheet Rows      First Filed Value   Admission Height  180.3 cm (71\") Documented at 10/08/2018 1659   Admission Weight  54 kg (119 lb) Documented at 10/08/2018 1746          Physical Exam:   General Appearance:    Sedated intubated.  ET tube good position orally.     Lungs:     Diminished breath sounds bilaterally     Heart:    Regular rhythm and normal rate, normal S1 and S2, no            murmur, no gallop, no rub, no click   Chest Wall:    No abnormalities observed   Abdomen:     Normal bowel sounds, no masses, no organomegaly, soft        non-tender, non-distended, " no guarding, no rebound                tenderness   Extremities:   1+ edema, no cyanosis, no             redness  CNS moving all extremities sedated intubated      Results Review:          Results from last 7 days  Lab Units 10/13/18  0445 10/12/18  2041 10/11/18  1815   SODIUM mmol/L 143 142 139   POTASSIUM mmol/L 3.8 4.2 4.7   CHLORIDE mmol/L 101 101 105   CO2 mmol/L 29.4* 26.9 24.6   BUN mg/dL 34* 33* 30*   CREATININE mg/dL 0.61* 0.73* 0.82   GLUCOSE mg/dL 105* 166* 172*   CALCIUM mg/dL 8.6 9.2 8.3*       Results from last 7 days  Lab Units 10/13/18  0445 10/12/18  2041 10/11/18  1057   TROPONIN T ng/mL 0.065* 0.071* 0.193*       Results from last 7 days  Lab Units 10/13/18  0445 10/11/18  1815 10/11/18  1057   WBC 10*3/mm3 15.65* 14.76* 14.11*   HEMOGLOBIN g/dL 14.0 13.5* 12.6*   HEMATOCRIT % 44.4 41.5 39.1*   PLATELETS 10*3/mm3 252 270 267       Results from last 7 days  Lab Units 10/11/18  1057   INR  1.12*   APTT seconds 28.2           Results from last 7 days  Lab Units 10/13/18  0445   MAGNESIUM mg/dL 2.3           Results from last 7 days  Lab Units 10/13/18  0408   PH, ARTERIAL pH units 7.423   PO2 ART mm Hg 83.7   PCO2, ARTERIAL mm Hg 48.3*   HCO3 ART mmol/L 31.5*       I reviewed the patient's new clinical results.  I personally viewed and interpreted the patient's CXR        Medication Review:     acetylcysteine 4 mL Nebulization Q8H - RT   albuterol 6 puff Inhalation Q4H - RT   ceftriaxone 1 g Intravenous Q24H   doxycycline 100 mg Intravenous Q12H   enoxaparin 30 mg Subcutaneous Q24H   famotidine 20 mg Intravenous Q12H   insulin aspart 0-9 Units Subcutaneous Q6H   methylPREDNISolone sodium succinate 40 mg Intravenous Q12H   metoprolol tartrate 5 mg Intravenous Q4H   nicotine 1 patch Transdermal Q24H         amiodarone 1 mg/min Last Rate: 1 mg/min (10/13/18 2577)   amiodarone 0.5 mg/min Last Rate: 0.5 mg/min (10/13/18 1108)   lidocaine cardiac 0.5 mg/min Last Rate: 0.5 mg/min (10/13/18 7426)    norepinephrine 0.02-0.3 mcg/kg/min Last Rate: 0.14 mcg/kg/min (10/13/18 0552)   propofol 5-50 mcg/kg/min Last Rate: 20 mcg/kg/min (10/13/18 0947)       ASSESSMENT:   Status post V. fib cardiac arrest  Acute respiratory failure, vent 10/11/18  Shock, multifactorial  Cardiomyopathy - EF 10%  Atrial fibrillation with rapid ventricular rate  Left lower lobe pneumonia  Right middle lobe atelectasis  Acute respiratory failure, hypoxia  COPD cachexia  Severe COPD with exacerbation  Current tobacco use  Current alcohol use  Confusion    PLAN:  Amiodarone and lidocaine drip to continue  Continue vent support  Medical management for V. fib arrest per cardiology  Continue antibiotics  Continue steroids  We'll stabilize today  No weaning from the vent today  Continue supportive care  Currently no family at bedside  Overall prognosis poor due to multiple medical problems and issues    Joel Ballesteros MD  10/13/18  9:55 AM      Time: Critical care 35 min

## 2018-10-13 NOTE — PLAN OF CARE
Problem: Pneumonia (Adult)  Goal: Signs and Symptoms of Listed Potential Problems Will be Absent, Minimized or Managed (Pneumonia)  Outcome: Ongoing (interventions implemented as appropriate)      Problem: Chronic Obstructive Pulmonary Disease (Adult)  Goal: Signs and Symptoms of Listed Potential Problems Will be Absent, Minimized or Managed (Chronic Obstructive Pulmonary Disease)  Outcome: Ongoing (interventions implemented as appropriate)      Problem: Patient Care Overview  Goal: Plan of Care Review  Outcome: Ongoing (interventions implemented as appropriate)   10/13/18 0522   Coping/Psychosocial   Plan of Care Reviewed With patient   OTHER   Outcome Summary Pt went into vfib suddenly and subsequently shocked. Pt rhythm converted back to sinus. MD at bedside orders noted. Pt went into v fib X 3 additional times. Family notified and was at bedside. Famiily was explained critical nature of patient status by cardiologist. Family and patient discussed goals of care and decided to remain full code at this time. Pt developed worsening respiratory status and subsequently intubated and sedated. Pt on multiple drips, condition is critical at this time.    Plan of Care Review   Progress declining     Goal: Individualization and Mutuality  Outcome: Ongoing (interventions implemented as appropriate)    Goal: Discharge Needs Assessment  Outcome: Ongoing (interventions implemented as appropriate)    Goal: Interprofessional Rounds/Family Conf  Outcome: Ongoing (interventions implemented as appropriate)      Problem: Fall Risk (Adult)  Goal: Absence of Fall  Outcome: Ongoing (interventions implemented as appropriate)      Problem: Breathing Pattern Ineffective (Adult)  Goal: Effective Oxygenation/Ventilation  Outcome: Ongoing (interventions implemented as appropriate)    Goal: Anxiety/Fear Reduction  Outcome: Ongoing (interventions implemented as appropriate)      Problem: Restraint, Nonbehavioral (Nonviolent)  Goal: Rationale  and Justification  Outcome: Ongoing (interventions implemented as appropriate)      Problem: Ventilation, Mechanical Invasive (Adult)  Goal: Signs and Symptoms of Listed Potential Problems Will be Absent, Minimized or Managed (Ventilation, Mechanical Invasive)  Outcome: Ongoing (interventions implemented as appropriate)      Problem: Pain, Acute (Adult)  Goal: Acceptable Pain Control/Comfort Level  Outcome: Ongoing (interventions implemented as appropriate)

## 2018-10-13 NOTE — PROGRESS NOTES
I was called to see the patient emergently.  He had 3 ventricular fibrillation arrests within approximately a 15 minute period.  He was successfully shocked each time.  At this point, he has received 2 boluses of amiodarone at 150 mg each.  He is on an amiodarone drip at 1 mg/kg/m.    He was then bolused with 100 mg of lidocaine, although I elected not to start a drip to avoid toxicity potentially with his low ejection fraction.  He was also given 5 mg of IV Lopressor, and was started on Lopressor 5 mg IV every 4 hours.  He is awake and talking, and has no complaints of chest discomfort.  His EKG shows no ischemic changes.  His ejection fraction earlier today was noted to be 10%.     He has advanced stage COPD, and appears to be frail.  I am not sure if he is ideally appropriate for a cardiac catheterization at this point.  For now, we are going to continue medical therapy.  The ventricular fibrillation may be a response to his low ejection fraction rather than from coronary disease.  History troponin is currently pending, and I will also check another in the morning.  I have also ordered a repeat EKG for the morning.  His family is evidently on the way in.    Denzel Yan M.D.

## 2018-10-13 NOTE — PLAN OF CARE
Problem: Pneumonia (Adult)  Goal: Signs and Symptoms of Listed Potential Problems Will be Absent, Minimized or Managed (Pneumonia)  Outcome: Ongoing (interventions implemented as appropriate)   10/13/18 1833   Goal/Outcome Evaluation   Problems Assessed (Pneumonia) all   Problems Present (Pneumonia) infection progression;respiratory compromise       Problem: Chronic Obstructive Pulmonary Disease (Adult)  Goal: Signs and Symptoms of Listed Potential Problems Will be Absent, Minimized or Managed (Chronic Obstructive Pulmonary Disease)  Outcome: Ongoing (interventions implemented as appropriate)   10/13/18 1833   Goal/Outcome Evaluation   Problems Assessed (Chronic Obstructive Pulmonary Disease (COPD)) all   Problems Present (COPD, Bronch/Emphy) infection;respiratory compromise;situational response       Problem: Patient Care Overview  Goal: Plan of Care Review  Outcome: Ongoing (interventions implemented as appropriate)   10/13/18 1833   Coping/Psychosocial   Plan of Care Reviewed With patient;spouse;family   OTHER   Outcome Summary remains sedated on vent, no significant events throughout day, indwelling catheter remains, central line remains, pt on multiple drips, pt awakes easy to voice and follows commands, pt remains full cpr at this time.        Problem: Nutrition, Imbalanced: Inadequate Oral Intake (Adult)  Goal: Improved Oral Intake  Outcome: Ongoing (interventions implemented as appropriate)   10/13/18 1833   Nutrition, Imbalanced: Inadequate Oral Intake (Adult)   Improved Oral Intake making progress toward outcome     Goal: Prevent Further Weight Loss  Outcome: Ongoing (interventions implemented as appropriate)   10/13/18 1833   Nutrition, Imbalanced: Inadequate Oral Intake (Adult)   Prevent Further Weight Loss making progress toward outcome       Problem: Fall Risk (Adult)  Goal: Absence of Fall  Outcome: Ongoing (interventions implemented as appropriate)   10/13/18 1833   Fall Risk (Adult)   Absence of  Fall making progress toward outcome       Problem: Breathing Pattern Ineffective (Adult)  Goal: Effective Oxygenation/Ventilation  Outcome: Ongoing (interventions implemented as appropriate)   10/13/18 1833   Breathing Pattern Ineffective (Adult)   Effective Oxygenation/Ventilation making progress toward outcome     Goal: Anxiety/Fear Reduction  Outcome: Ongoing (interventions implemented as appropriate)   10/13/18 1833   Breathing Pattern Ineffective (Adult)   Anxiety/Fear Reduction making progress toward outcome       Problem: Skin Injury Risk (Adult)  Goal: Skin Health and Integrity  Outcome: Ongoing (interventions implemented as appropriate)   10/13/18 1833   Skin Injury Risk (Adult)   Skin Health and Integrity making progress toward outcome       Problem: Restraint, Nonbehavioral (Nonviolent)  Goal: Rationale and Justification  Outcome: Ongoing (interventions implemented as appropriate)   10/13/18 1833   Restraint, Nonbehavioral (Nonviolent)   Rationale and Justification prevent line/tube removal       Problem: Ventilation, Mechanical Invasive (Adult)  Goal: Signs and Symptoms of Listed Potential Problems Will be Absent, Minimized or Managed (Ventilation, Mechanical Invasive)  Outcome: Ongoing (interventions implemented as appropriate)   10/13/18 1833   Goal/Outcome Evaluation   Problems Assessed (Mechanical Ventilation, Invasive) all   Problems Present (Mech Vent, Invasive) inability to wean       Problem: Pain, Acute (Adult)  Goal: Acceptable Pain Control/Comfort Level  Outcome: Ongoing (interventions implemented as appropriate)   10/13/18 1833   Pain, Acute (Adult)   Acceptable Pain Control/Comfort Level making progress toward outcome

## 2018-10-13 NOTE — PROGRESS NOTES
LOS: 4 days   Patient Care Team:  Jaime Chung MD as PCP - General  Jaime Chung MD as PCP - Family Medicine  [facility], Gerardo Cardoza MD as PCP - Claims Attributed    Chief Complaint: Rapid AF, cardiomyopathy     Interval History: He converted to SR.  He is hemodynamically stable.  No cardiac complaints.  Remains intubated.  ECHO showed EF of 10%.      Objective   Vital Signs  Heart Rate:  [] 114  Resp:  [17-26] 20  BP: ()/() 154/101  FiO2 (%):  [30 %-40 %] 30 %    Intake/Output Summary (Last 24 hours) at 10/12/18 2044  Last data filed at 10/12/18 1600   Gross per 24 hour   Intake           1233.1 ml   Output              749 ml   Net            484.1 ml       Intubated, comfortable, awake.  PERRL, conjunctiva clear  Neck supple, no JVD or thyromegaly appreciated  S1/S2 RRR, no m/r/g  Lungs CTA B, normal effort  Abdomen S/NT/ND (+) BS, no HSM appreciated  Extremities warm, no clubbing, cyanosis, or edema  No visible or palpable skin lesions    Results Review:        Results from last 7 days  Lab Units 10/11/18  1815 10/11/18  1057 10/09/18  0540   SODIUM mmol/L 139 140 136   POTASSIUM mmol/L 4.7 4.4 3.5   CHLORIDE mmol/L 105 106 96*   CO2 mmol/L 24.6 24.0 26.3   BUN mg/dL 30* 24* 16   CREATININE mg/dL 0.82 0.72* 0.70*   GLUCOSE mg/dL 172* 229* 218*   CALCIUM mg/dL 8.3* 7.5* 8.6       Results from last 7 days  Lab Units 10/11/18  1057 10/08/18  1701   TROPONIN T ng/mL 0.193* <0.010       Results from last 7 days  Lab Units 10/11/18  1815 10/11/18  1057 10/09/18  0540   WBC 10*3/mm3 14.76* 14.11* 8.57   HEMOGLOBIN g/dL 13.5* 12.6* 12.9*   HEMATOCRIT % 41.5 39.1* 41.1   PLATELETS 10*3/mm3 270 267 176       Results from last 7 days  Lab Units 10/11/18  1057   INR  1.12*   APTT seconds 28.2           Results from last 7 days  Lab Units 10/11/18  1815   MAGNESIUM mg/dL 2.3           I reviewed the patient's new clinical results.  I personally viewed and interpreted the patient's EKG/Telemetry  data        Medication Review:     acetylcysteine 4 mL Nebulization Q8H - RT   amiodarone 200 mg Oral Q12H   ceftriaxone 1 g Intravenous Q24H   doxycycline 100 mg Intravenous Q12H   enoxaparin 30 mg Subcutaneous Q24H   famotidine 20 mg Intravenous Q12H   insulin aspart 0-9 Units Subcutaneous Q6H   ipratropium-albuterol 3 mL Nebulization Q4H - RT   methylPREDNISolone sodium succinate 40 mg Intravenous Q12H   nicotine 1 patch Transdermal Q24H         amiodarone 1 mg/min       Assessment/Plan       Pneumonia    COPD exacerbation (CMS/HCC)    Tobacco abuse    Acute hypoxemic respiratory failure (CMS/HCC)    Severe malnutrition due to chronic illness    Sepsis (CMS/HCC)    Atelectasis of right lung    Rapid AF, converted to SR on IV amiodarone.  Change to oral when extubated.    He has a severe cardiomyopathy.  May be tachycardia-induced.    Consider ischemic evaluation (right and left heart catheterization) in future.  Start ACE-I, beta-blocker when taking orals.    James Dewitt MD  10/12/18  8:44 PM

## 2018-10-13 NOTE — CODE DOCUMENTATION
CODE BLUE arrest while I was in the unit.  Patient had V. fib.  He was bagged and CPR done briefly.  Cardioversion performed with return of systemic circulation.  Amiodarone bolus 150 mg given.    Patient then proceeded to have a second and third V. fib arrest from which he was shocked out of.  Additional amiodarone bolus given.  After the third V. fib arrest he was given lidocaine 100 mg IV.  He was also started on Lopressor.    I discussed the case at length with the cardiologist was at the bedside.  Continue supportive care at this time.    Intubation was not required with the above.  Patient is saturating adequately on his hemodynamics are intact at this time.

## 2018-10-13 NOTE — PLAN OF CARE
Problem: Patient Care Overview  Goal: Plan of Care Review   10/12/18 2045   Coping/Psychosocial   Plan of Care Reviewed With patient   OTHER   Outcome Summary pt extubated and off levophed. EF 10%. increased PO intake. Adequate urine output via cath   Plan of Care Review   Progress improving     Goal: Individualization and Mutuality  Outcome: Ongoing (interventions implemented as appropriate)    Goal: Discharge Needs Assessment  Outcome: Ongoing (interventions implemented as appropriate)    Goal: Interprofessional Rounds/Family Conf  Outcome: Ongoing (interventions implemented as appropriate)      Problem: Nutrition, Imbalanced: Inadequate Oral Intake (Adult)  Goal: Identify Related Risk Factors and Signs and Symptoms  Outcome: Outcome(s) achieved Date Met: 10/12/18    Goal: Improved Oral Intake  Outcome: Ongoing (interventions implemented as appropriate)    Goal: Prevent Further Weight Loss  Outcome: Ongoing (interventions implemented as appropriate)      Problem: Fall Risk (Adult)  Goal: Identify Related Risk Factors and Signs and Symptoms  Outcome: Outcome(s) achieved Date Met: 10/12/18    Goal: Absence of Fall  Outcome: Ongoing (interventions implemented as appropriate)      Problem: Breathing Pattern Ineffective (Adult)  Goal: Effective Oxygenation/Ventilation  Outcome: Ongoing (interventions implemented as appropriate)    Goal: Anxiety/Fear Reduction  Outcome: Ongoing (interventions implemented as appropriate)      Problem: Skin Injury Risk (Adult)  Goal: Identify Related Risk Factors and Signs and Symptoms  Outcome: Outcome(s) achieved Date Met: 10/12/18    Goal: Skin Health and Integrity  Outcome: Ongoing (interventions implemented as appropriate)      Problem: Restraint, Nonbehavioral (Nonviolent)  Goal: Rationale and Justification  Outcome: Outcome(s) achieved Date Met: 10/12/18    Goal: Nonbehavioral (Nonviolent) Restraint: Absence of Injury/Harm  Outcome: Outcome(s) achieved Date Met: 10/12/18    Goal:  Nonbehavioral (Nonviolent) Restraint: Achievement of Discontinuation Criteria  Outcome: Outcome(s) achieved Date Met: 10/12/18    Goal: Nonbehavioral (Nonviolent) Restraint: Preservation of Dignity and Wellbeing  Outcome: Outcome(s) achieved Date Met: 10/12/18      Problem: Ventilation, Mechanical Invasive (Adult)  Goal: Signs and Symptoms of Listed Potential Problems Will be Absent, Minimized or Managed (Ventilation, Mechanical Invasive)  Outcome: Outcome(s) achieved Date Met: 10/12/18      Problem: Pain, Acute (Adult)  Goal: Identify Related Risk Factors and Signs and Symptoms  Outcome: Outcome(s) achieved Date Met: 10/12/18    Goal: Acceptable Pain Control/Comfort Level  Outcome: Ongoing (interventions implemented as appropriate)

## 2018-10-14 NOTE — NURSING NOTE
After MD Ballesteros had discussion with family regarding patient goal of care, the family has decided on palliative care, Dr. Ballesteros informed.

## 2018-10-14 NOTE — PROGRESS NOTES
"DAILY PROGRESS NOTE  [facility]    Patient Identification:  Name: Parth Woody  Age: 78 y.o.  Sex: male  :  1940  MRN: [id number]         Primary Care Physician: Jaime Chung MD    Subjective:  Interval History:Patient sedated on vent , but a little more awake. Code cart parked outside of his room. Events noted.       Objective:    Scheduled Meds:    acetylcysteine 4 mL Nebulization Q8H - RT   albuterol 6 puff Inhalation Q4H - RT   ceftriaxone 1 g Intravenous Q24H   doxycycline 100 mg Intravenous Q12H   enoxaparin 30 mg Subcutaneous Q24H   famotidine 20 mg Intravenous Q12H   furosemide 20 mg Intravenous Once   insulin aspart 0-9 Units Subcutaneous Q6H   methylPREDNISolone sodium succinate 40 mg Intravenous Q12H   metoprolol tartrate 5 mg Intravenous Q4H   nicotine 1 patch Transdermal Q24H   thiamine (VITAMIN B1) IVPB 100 mg Intravenous Daily     Continuous Infusions:    amiodarone 1 mg/min Last Rate: 1 mg/min (10/13/18 0136)   amiodarone 0.5 mg/min Last Rate: 0.5 mg/min (10/14/18 1041)   lidocaine cardiac 0.5 mg/min Last Rate: Stopped (10/14/18 104)   norepinephrine 0.02-0.3 mcg/kg/min Last Rate: 0.1 mcg/kg/min (10/14/18 1120)   propofol 5-50 mcg/kg/min Last Rate: 20 mcg/kg/min (10/14/18 0822)       Vital signs in last 24 hours:  Heart Rate:  [48-69] 60  Resp:  [14-21] 20  BP: ()/(56-84) 113/73  FiO2 (%):  [40 %-41 %] 40 %    Intake/Output:    Intake/Output Summary (Last 24 hours) at 10/14/18 1335  Last data filed at 10/14/18 0620   Gross per 24 hour   Intake          1675.44 ml   Output             1250 ml   Net           425.44 ml       Exam:  /73   Pulse 60   Temp 98.8 °F (37.1 °C)   Resp 20   Ht 180.3 cm (71\")   Wt 50.9 kg (112 lb 3.4 oz)   SpO2 100%   BMI 15.65 kg/m²     General Appearance:    Sedated on vent, no distress   Head:    Normocephalic, without obvious abnormality, atraumatic   Eyes:       Throat:  intubated   Neck:   Supple, symmetrical, trachea " midline, no JVD   Lungs:     Clear to auscultation bilaterally, respirations unlabored   Chest Wall:    No tenderness or deformity    Heart:    Regular rate and rhythm, S1 and S2 normal, no murmur,no  Rub or gallop   Abdomen:     Soft, non-tender, bowel sounds active, no masses, no organomegaly    Extremities:   Extremities normal, atraumatic, no cyanosis or edema   Pulses:      Skin:   Skin is warm and dry,  no rashes or palpable lesions   Neurologic:   Sedated on vent      Lab Results (last 72 hours)     Procedure Component Value Units Date/Time    BAL Culture, Quantitative - Lavage, Lung, R [[id number]]  (Normal) Collected:  10/11/18 1222    Specimen:  Lavage from Lung, R Updated:  10/13/18 0741     BAL Culture No growth at 2 days     Gram Stain Result No epithelial cells seen      Many (4+) WBCs seen      No organisms seen    BNP [[id number]]  (Abnormal) Collected:  10/13/18 0445    Specimen:  Blood Updated:  10/13/18 0544     proBNP 29,181.0 (H) pg/mL     Narrative:       Among patients with dyspnea, NT-proBNP is highly sensitive for the detection of acute congestive heart failure. In addition NT-proBNP of <300 pg/ml effectively rules out acute congestive heart failure with 99% negative predictive value.    POC Glucose Once [[id number]]  (Normal) Collected:  10/13/18 0536    Specimen:  Blood Updated:  10/13/18 0538     Glucose 104 mg/dL     Narrative:       Meter: [id number] : [id number] Renetta LACEY    Troponin [[id number]]  (Abnormal) Collected:  10/13/18 0445    Specimen:  Blood Updated:  10/13/18 0535     Troponin T 0.065 (H) ng/mL     Narrative:       Troponin T Reference Ranges:  Less than 0.03 ng/mL:    Negative for AMI  0.03 to 0.09 ng/mL:      Indeterminant for AMI  Greater than 0.09 ng/mL: Positive for AMI    Comprehensive Metabolic Panel [[id number]]  (Abnormal) Collected:  10/13/18 0445    Specimen:  Blood Updated:  10/13/18 0534     Glucose 105 (H) mg/dL      BUN 34 (H) mg/dL      Creatinine  0.61 (L) mg/dL      Sodium 143 mmol/L      Potassium 3.8 mmol/L      Chloride 101 mmol/L      CO2 29.4 (H) mmol/L      Calcium 8.6 mg/dL      Total Protein 5.8 (L) g/dL      Albumin 3.20 (L) g/dL      ALT (SGPT) 85 (H) U/L      AST (SGOT) 42 (H) U/L      Alkaline Phosphatase 66 U/L      Total Bilirubin <0.2 mg/dL      eGFR Non African Amer 128 mL/min/1.73      Globulin 2.6 gm/dL      A/G Ratio 1.2 g/dL      BUN/Creatinine Ratio 55.7 (H)     Anion Gap 12.6 mmol/L     Narrative:       The MDRD GFR formula is only valid for adults with stable renal function between ages 18 and 70.    Magnesium [[id number]]  (Normal) Collected:  10/13/18 0445    Specimen:  Blood Updated:  10/13/18 0530     Magnesium 2.3 mg/dL     CBC (No Diff) [[id number]]  (Abnormal) Collected:  10/13/18 0445    Specimen:  Blood Updated:  10/13/18 0515     WBC 15.65 (H) 10*3/mm3      RBC 4.93 10*6/mm3      Hemoglobin 14.0 g/dL      Hematocrit 44.4 %      MCV 90.1 fL      MCH 28.4 pg      MCHC 31.5 (L) g/dL      RDW 15.0 (H) %      RDW-SD 49.0 fl      MPV 11.0 fL      Platelets 252 10*3/mm3     Blood Gas, Arterial [[id number]]  (Abnormal) Collected:  10/13/18 0408    Specimen:  Arterial Blood Updated:  10/13/18 0410     Site Arterial: right radial     Richi's Test N/A     pH, Arterial 7.423 pH units      pCO2, Arterial 48.3 (H) mm Hg      pO2, Arterial 83.7 mm Hg      HCO3, Arterial 31.5 (H) mmol/L      Base Excess, Arterial 5.9 (H) mmol/L      O2 Saturation Calculated 96.3 %      A-a Gradiant 0.2 mmHg      Barometric Pressure for Blood Gas 754.6 mmHg      Modality Adult Vent     FIO2 60 %      Ventilator Mode PC     Set Mech Resp Rate 14     Rate 23 Breaths/minute      PEEP 5    Narrative:       IP 15 Meter: [id number] : [id number] Chad Nice    POC Glucose Once [[id number]]  (Abnormal) Collected:  10/13/18 0008    Specimen:  Blood Updated:  10/13/18 0026     Glucose 246 (H) mg/dL     Narrative:       Meter: [id number] : [id number] Renetta  Jacqueline DEEPTHI    Troponin [[id number]]  (Abnormal) Collected:  10/12/18 2041    Specimen:  Blood Updated:  10/12/18 2133     Troponin T 0.071 (H) ng/mL     Narrative:       Troponin T Reference Ranges:  Less than 0.03 ng/mL:    Negative for AMI  0.03 to 0.09 ng/mL:      Indeterminant for AMI  Greater than 0.09 ng/mL: Positive for AMI    Comprehensive Metabolic Panel [[id number]]  (Abnormal) Collected:  10/12/18 2041    Specimen:  Blood Updated:  10/12/18 2119     Glucose 166 (H) mg/dL      BUN 33 (H) mg/dL      Creatinine 0.73 (L) mg/dL      Sodium 142 mmol/L      Potassium 4.2 mmol/L      Chloride 101 mmol/L      CO2 26.9 mmol/L      Calcium 9.2 mg/dL      Total Protein 6.6 g/dL      Albumin 3.50 g/dL      ALT (SGPT) 96 (H) U/L      AST (SGOT) 52 (H) U/L      Alkaline Phosphatase 77 U/L      Total Bilirubin 0.2 mg/dL      eGFR Non African Amer 104 mL/min/1.73      Globulin 3.1 gm/dL      A/G Ratio 1.1 g/dL      BUN/Creatinine Ratio 45.2 (H)     Anion Gap 14.1 mmol/L     Narrative:       The MDRD GFR formula is only valid for adults with stable renal function between ages 18 and 70.    Magnesium [[id number]]  (Normal) Collected:  10/12/18 2041    Specimen:  Blood Updated:  10/12/18 2117     Magnesium 2.4 mg/dL     POC Glucose Once [[id number]]  (Abnormal) Collected:  10/12/18 2051    Specimen:  Blood Updated:  10/12/18 2053     Glucose 186 (H) mg/dL     Narrative:       Meter: [id number] : [id number] Renetta Jacqueline NAT    Blood Culture - Blood, [[id number]]  (Normal) Collected:  10/08/18 1849    Specimen:  Blood from Arm, Right Updated:  10/12/18 1900     Blood Culture No growth at 4 days    Blood Culture - Blood, [[id number]]  (Normal) Collected:  10/08/18 1709    Specimen:  Blood from Arm, Left Updated:  10/12/18 1715     Blood Culture No growth at 4 days    POC Glucose Once [[id number]]  (Normal) Collected:  10/12/18 1634    Specimen:  Blood Updated:  10/12/18 1637     Glucose 117 mg/dL     Narrative:       Meter:  [id number] : [id number] Elmira Clarissa DEEPTHI    Blood Culture - Blood, Blood, Venous Line [[id number]]  (Normal) Collected:  10/11/18 1403    Specimen:  Blood from Arm, Left Updated:  10/12/18 1431     Blood Culture No growth at 24 hours    Blood Culture - Blood, Blood, Central Line [[id number]]  (Normal) Collected:  10/11/18 1402    Specimen:  Blood from Blood, Central Line Updated:  10/12/18 1431     Blood Culture No growth at 24 hours    AFB Culture - Lavage, Lung, R [[id number]] Collected:  10/11/18 1222    Specimen:  Lavage from Lung, R Updated:  10/12/18 1400     AFB Stain No acid fast bacilli seen on concentrated smear    Blood Gas, Arterial [[id number]]  (Abnormal) Collected:  10/12/18 1239    Specimen:  Arterial Blood Updated:  10/12/18 1244     Site Arterial: right radial     Richi's Test Positive     pH, Arterial 7.404 pH units      pCO2, Arterial 41.3 mm Hg      pO2, Arterial 75.6 (L) mm Hg      HCO3, Arterial 25.8 mmol/L      Base Excess, Arterial 0.9 mmol/L      O2 Saturation Calculated 95.0 %      A-a Gradiant 0.4 mmHg      Barometric Pressure for Blood Gas 753.8 mmHg      Modality Adult Vent     FIO2 30 %      Ventilator Mode PC/PS     Rate 27 Breaths/minute      PEEP 5     PSV 8 cmH2O     Narrative:       PS 8 5 30% Meter: [id number] : [id number] Roscoe Leonard    Non-gynecologic Cytology [[id number]] Collected:  10/11/18 1222    Specimen:  Lavage from Lung, R Updated:  10/12/18 0756    POC Glucose Once [[id number]]  (Abnormal) Collected:  10/12/18 0532    Specimen:  Blood Updated:  10/12/18 0533     Glucose 150 (H) mg/dL     Narrative:       Meter: [id number] : [id number] [employer] DEEPTHI    POC Glucose Once [[id number]]  (Abnormal) Collected:  10/11/18 2343    Specimen:  Blood Updated:  10/11/18 2347     Glucose 145 (H) mg/dL     Narrative:       Meter: [id number] : [id number] WiDaPeopleara DEEPTHI    POC Glucose Once [[id number]]  (Abnormal) Collected:  10/11/18 2009    Specimen:  Blood  Updated:  10/11/18 2012     Glucose 162 (H) mg/dL     Narrative:       Meter: [id number] : [id number] Renetta Phillips [facility]    Comprehensive Metabolic Panel [[id number]]  (Abnormal) Collected:  10/11/18 1815    Specimen:  Blood Updated:  10/11/18 1927     Glucose 172 (H) mg/dL      BUN 30 (H) mg/dL      Creatinine 0.82 mg/dL      Sodium 139 mmol/L      Potassium 4.7 mmol/L      Chloride 105 mmol/L      CO2 24.6 mmol/L      Calcium 8.3 (L) mg/dL      Total Protein 6.0 g/dL      Albumin 2.80 (L) g/dL      ALT (SGPT) 123 (H) U/L      AST (SGOT) 123 (H) U/L      Alkaline Phosphatase 71 U/L      Total Bilirubin 0.2 mg/dL      eGFR Non African Amer 91 mL/min/1.73      Globulin 3.2 gm/dL      A/G Ratio 0.9 g/dL      BUN/Creatinine Ratio 36.6 (H)     Anion Gap 9.4 mmol/L     Narrative:       The MDRD GFR formula is only valid for adults with stable renal function between ages 18 and 70.    Magnesium [[id number]]  (Normal) Collected:  10/11/18 1815    Specimen:  Blood Updated:  10/11/18 1902     Magnesium 2.3 mg/dL     Phosphorus [[id number]]  (Normal) Collected:  10/11/18 1815    Specimen:  Blood Updated:  10/11/18 1902     Phosphorus 3.0 mg/dL     CBC & Differential [[id number]] Collected:  10/11/18 1815    Specimen:  Blood Updated:  10/11/18 1847    Narrative:       The following orders were created for panel order CBC & Differential.  Procedure                               Abnormality         Status                     ---------                               -----------         ------                     CBC Auto Differential[[id number]]        Abnormal            Final result                 Please view results for these tests on the individual orders.    CBC Auto Differential [[id number]]  (Abnormal) Collected:  10/11/18 1815    Specimen:  Blood Updated:  10/11/18 1847     WBC 14.76 (H) 10*3/mm3      RBC 4.64 10*6/mm3      Hemoglobin 13.5 (L) g/dL      Hematocrit 41.5 %      MCV 89.4 fL      MCH 29.1 pg      MCHC 32.5  (L) g/dL      RDW 15.5 (H) %      RDW-SD 50.3 fl      MPV 10.9 fL      Platelets 270 10*3/mm3      Neutrophil % 86.3 (H) %      Lymphocyte % 8.9 (L) %      Monocyte % 4.7 (L) %      Eosinophil % 0.0 (L) %      Basophil % 0.1 %      Immature Grans % 0.3 %      Neutrophils, Absolute 12.74 (H) 10*3/mm3      Lymphocytes, Absolute 1.32 10*3/mm3      Monocytes, Absolute 0.69 10*3/mm3      Eosinophils, Absolute 0.00 10*3/mm3      Basophils, Absolute 0.01 10*3/mm3      Immature Grans, Absolute 0.05 (H) 10*3/mm3     POC Glucose Once [[id number]]  (Abnormal) Collected:  10/11/18 1608    Specimen:  Blood Updated:  10/11/18 1609     Glucose 222 (H) mg/dL     Narrative:       Meter: [id number] : [id number] Mal She'Kerry NA    Lactic Acid, Reflex [[id number]]  (Normal) Collected:  10/11/18 1508    Specimen:  Blood Updated:  10/11/18 1533     Lactate 1.7 mmol/L     Lactic Acid, Reflex Timer (This will reflex a repeat order 3-3:15 hours after ordered.) [[id number]] Collected:  10/11/18 1057    Specimen:  Blood Updated:  10/11/18 1431     Extra Tube Hold for add-ons.     Comment: Auto resulted.       Body Fluid Cell Count With Differential - Lavage, Lung, R [[id number]] Collected:  10/11/18 1222    Specimen:  Lavage from Lung, R Updated:  10/11/18 1402    Narrative:       The following orders were created for panel order Body Fluid Cell Count With Differential - Lavage, Lung, R.  Procedure                               Abnormality         Status                     ---------                               -----------         ------                     Body fluid cell count - ...[[id number]]  Abnormal            Final result               Body fluid differential ...[[id number]]                      Final result                 Please view results for these tests on the individual orders.    Body fluid differential - Body Fluid, [[id number]] Collected:  10/11/18 1222    Specimen:  Lavage Updated:  10/11/18 1402     Neutrophils, Fluid 69  %      Lymphocytes, Fluid 22 %      Mononuclear, Fluid 9 %     Body fluid cell count - Body Fluid, [[id number]]  (Abnormal) Collected:  10/11/18 1222    Specimen:  Lavage Updated:  10/11/18 1400     Color, Fluid White     Appearance, Fluid Cloudy (A)     WBC, Fluid 1,280 /mm3      Comment: Estimated count due to clots present in specimen.        RBC, Fluid 26 /mm3      Comment: Estimated count due to clots present in specimen.       Blood Gas, Arterial [[id number]]  (Abnormal) Collected:  10/11/18 1138    Specimen:  Arterial Blood Updated:  10/11/18 1142     Site Arterial: right radial     Richi's Test Positive     pH, Arterial 7.211 (C) pH units      Comment: Critical:Notify Dr holden (11-Oct-18 11:40:53)Read back ok        pCO2, Arterial 55.7 (C) mm Hg      Comment: Critical:Notify Dr holden (11-Oct-18 11:40:53)Read back ok        pO2, Arterial 421.8 (H) mm Hg      HCO3, Arterial 22.3 mmol/L      Base Excess, Arterial -6.2 (L) mmol/L      O2 Saturation Calculated 99.9 (H) %      A-a Gradiant 0.6 mmHg      Barometric Pressure for Blood Gas 751.6 mmHg      Modality Adult Vent     FIO2 100 %      Ventilator Mode AC     Set Mech Resp Rate 16     Rate 18 Breaths/minute      PEEP 5    Narrative:       sats 90. Ip 18 Meter: [id number] : [id number] Busby Esther    Comprehensive Metabolic Panel [[id number]]  (Abnormal) Collected:  10/11/18 1057    Specimen:  Blood Updated:  10/11/18 1139     Glucose 229 (H) mg/dL      BUN 24 (H) mg/dL      Creatinine 0.72 (L) mg/dL      Sodium 140 mmol/L      Potassium 4.4 mmol/L      Chloride 106 mmol/L      CO2 24.0 mmol/L      Calcium 7.5 (L) mg/dL      Total Protein 5.5 (L) g/dL      Albumin 2.90 (L) g/dL      ALT (SGPT) 62 (H) U/L      AST (SGOT) 80 (H) U/L      Alkaline Phosphatase 60 U/L      Total Bilirubin 0.2 mg/dL      eGFR Non African Amer 106 mL/min/1.73      Globulin 2.6 gm/dL      A/G Ratio 1.1 g/dL      BUN/Creatinine Ratio 33.3 (H)     Anion Gap 10.0 mmol/L      Narrative:       The MDRD GFR formula is only valid for adults with stable renal function between ages 18 and 70.    Troponin [[id number]]  (Abnormal) Collected:  10/11/18 1057    Specimen:  Blood Updated:  10/11/18 1138     Troponin T 0.193 (C) ng/mL     Narrative:       Troponin T Reference Ranges:  Less than 0.03 ng/mL:    Negative for AMI  0.03 to 0.09 ng/mL:      Indeterminant for AMI  Greater than 0.09 ng/mL: Positive for AMI    Magnesium [[id number]]  (Normal) Collected:  10/11/18 1057    Specimen:  Blood Updated:  10/11/18 1134     Magnesium 2.0 mg/dL     aPTT [[id number]]  (Normal) Collected:  10/11/18 1057    Specimen:  Blood Updated:  10/11/18 1133     PTT 28.2 seconds     Protime-INR [[id number]]  (Abnormal) Collected:  10/11/18 1057    Specimen:  Blood Updated:  10/11/18 1133     Protime 14.2 Seconds      INR 1.12 (H)    Lactic Acid, Plasma [[id number]]  (Abnormal) Collected:  10/11/18 1057    Specimen:  Blood Updated:  10/11/18 1131     Lactate 4.2 (C) mmol/L     CBC & Differential [[id number]] Collected:  10/11/18 1057    Specimen:  Blood Updated:  10/11/18 1113    Narrative:       The following orders were created for panel order CBC & Differential.  Procedure                               Abnormality         Status                     ---------                               -----------         ------                     CBC Auto Differential[[id number]]        Abnormal            Final result                 Please view results for these tests on the individual orders.    CBC Auto Differential [[id number]]  (Abnormal) Collected:  10/11/18 1057    Specimen:  Blood Updated:  10/11/18 1113     WBC 14.11 (H) 10*3/mm3      RBC 4.36 (L) 10*6/mm3      Hemoglobin 12.6 (L) g/dL      Hematocrit 39.1 (L) %      MCV 89.7 fL      MCH 28.9 pg      MCHC 32.2 (L) g/dL      RDW 15.1 (H) %      RDW-SD 49.9 fl      MPV 10.5 fL      Platelets 267 10*3/mm3      Neutrophil % 91.3 (H) %      Lymphocyte % 6.8 (L) %      Monocyte %  1.8 (L) %      Eosinophil % 0.0 (L) %      Basophil % 0.1 %      Immature Grans % 0.3 %      Neutrophils, Absolute 12.89 (H) 10*3/mm3      Lymphocytes, Absolute 0.96 10*3/mm3      Monocytes, Absolute 0.25 10*3/mm3      Eosinophils, Absolute 0.00 10*3/mm3      Basophils, Absolute 0.01 10*3/mm3      Immature Grans, Absolute 0.04 (H) 10*3/mm3     Respiratory Culture - Sputum, Cough [[id number]] Collected:  10/08/18 2319    Specimen:  Sputum from Cough Updated:  10/11/18 0750     Respiratory Culture Light growth (2+) Normal Respiratory Bettie     Gram Stain Result Few (2+) Mixed bacterial morphotypes seen on Gram Stain    Blood Gas, Arterial [[id number]]  (Abnormal) Collected:  10/10/18 1125    Specimen:  Arterial Blood Updated:  10/10/18 1132     Site Arterial: right radial     Richi's Test Positive     pH, Arterial 7.414 pH units      pCO2, Arterial 41.1 mm Hg      pO2, Arterial 78.2 (L) mm Hg      HCO3, Arterial 26.3 mmol/L      Base Excess, Arterial 1.5 mmol/L      O2 Saturation Calculated 95.6 %      Barometric Pressure for Blood Gas 748.3 mmHg      Modality Cannula     Flow Rate 3 lpm      Set [facility] Resp Rate 32    Narrative:       SPO2 93% Meter: [id number] : [id number] Rebecca Perdomo        Data Review:    Results from last 7 days  Lab Units 10/14/18  0520 10/13/18  0445 10/12/18  2041   SODIUM mmol/L 143 143 142   POTASSIUM mmol/L 4.0 3.8 4.2   CHLORIDE mmol/L 101 101 101   CO2 mmol/L 29.1* 29.4* 26.9   BUN mg/dL 42* 34* 33*   CREATININE mg/dL 0.84 0.61* 0.73*   GLUCOSE mg/dL 208* 105* 166*   CALCIUM mg/dL 8.4* 8.6 9.2       Results from last 7 days  Lab Units 10/14/18  0520 10/13/18  0445 10/11/18  1815   WBC 10*3/mm3 11.12* 15.65* 14.76*   HEMOGLOBIN g/dL 13.7 14.0 13.5*   HEMATOCRIT % 44.1 44.4 41.5   PLATELETS 10*3/mm3 263 252 270               Lab Results  Lab Value Date/Time   TROPONINT 0.065 (H) 10/13/2018 0445   TROPONINT 0.071 (H) 10/12/2018 2041   TROPONINT 0.193 (C) 10/11/2018 1057   TROPONINT  <0.010 10/08/2018 1701           Results from last 7 days  Lab Units 10/13/18  0445 10/12/18  2041 10/11/18  1815   ALK PHOS U/L 66 77 71   BILIRUBIN mg/dL <0.2 0.2 0.2   ALT (SGPT) U/L 85* 96* 123*   AST (SGOT) U/L 42* 52* 123*             Glucose   Date/Time Value Ref Range Status   10/14/2018 1143 173 (H) 70 - 130 mg/dL Final   10/14/2018 0519 198 (H) 70 - 130 mg/dL Final   10/13/2018 2359 143 (H) 70 - 130 mg/dL Final   10/13/2018 1800 147 (H) 70 - 130 mg/dL Final   10/13/2018 1204 120 70 - 130 mg/dL Final   10/13/2018 0536 104 70 - 130 mg/dL Final   10/13/2018 0008 246 (H) 70 - 130 mg/dL Final   10/12/2018 2051 186 (H) 70 - 130 mg/dL Final       Results from last 7 days  Lab Units 10/11/18  1057   INR  1.12*       Patient Active Problem List   Diagnosis Code   • COPD exacerbation (CMS/HCC) J44.1   • Pneumonia J18.9   • Tobacco abuse Z72.0   • Acute hypoxemic respiratory failure (CMS/HCC) J96.01   • Severe malnutrition due to chronic illness E43   • Sepsis (CMS/HCC) A41.9   • Atelectasis of right lung J98.11   • Cardiac arrest (CMS/HCC) I46.9   • Ventricular fibrillation (CMS/HCC) I49.01   • Cardiomyopathy (CMS/HCC) I42.9       Assessment:  Active Hospital Problems    Diagnosis Date Noted   • **Pneumonia [J18.9] 10/08/2018   • Cardiac arrest (CMS/HCC) [I46.9] 10/13/2018   • Ventricular fibrillation (CMS/HCC) [I49.01] 10/13/2018   • Cardiomyopathy (CMS/HCC) [I42.9] 10/13/2018   • Sepsis (CMS/HCC) [A41.9] 10/10/2018   • Atelectasis of right lung [J98.11] 10/10/2018   • Severe malnutrition due to chronic illness [E43] 10/09/2018   • COPD exacerbation (CMS/HCC) [J44.1] 10/08/2018   • Tobacco abuse [Z72.0] 10/08/2018   • Acute hypoxemic respiratory failure (CMS/[facility]) [J96.01] 10/08/2018      Resolved Hospital Problems    Diagnosis Date Noted Date Resolved   No resolved problems to display.       Plan:  Continue ICU support. Antibiotics, Vent support, meds drips as per cardiology and pulmonary. Events noted. Discussed  with familyOutlook???    Aashish Mtz MD  10/14/2018  1:35 PM

## 2018-10-14 NOTE — PROGRESS NOTES
Hospital Follow Up    Chief Complaint: Follow up ventricular fibrillation arrest, afib, cardiomyopathy    Interval History:  Remains intubated.  Failed SBT today.  No recent ventricular ectopy or arrhthymias.  Remains on low dose norepinephrine.    Objective:     Objective:  Heart Rate:  [48-69] 53  Resp:  [14-21] 20  BP: ()/(56-84) 102/71  FiO2 (%):  [40 %-50 %] 40 %     Intake/Output Summary (Last 24 hours) at 10/14/18 1054  Last data filed at 10/14/18 0620   Gross per 24 hour   Intake          1675.44 ml   Output             1250 ml   Net           425.44 ml     Body mass index is 15.65 kg/m².  1    10/09/18  0500 10/11/18  1732 10/14/18  0600   Weight: 52.2 kg (115 lb) 52.2 kg (115 lb) 50.9 kg (112 lb 3.4 oz)     Weight change:       Physical Exam:   General : intubated  Neuro: wakes up, a little agitated  Lungs: CTAB. Normal respiratory effort and rate.  CV:: Regular rate and rhythm, normal S1 and S2, no murmurs, gallops or rubs.  ABD: Soft, nontender, non-distended. positive bowel sounds  Extr: No edema or cyanosis, moves all extremities    Lab Review:     Results from last 7 days  Lab Units 10/14/18  0520 10/13/18  0445 10/12/18  2041   SODIUM mmol/L 143 143 142   POTASSIUM mmol/L 4.0 3.8 4.2   CHLORIDE mmol/L 101 101 101   CO2 mmol/L 29.1* 29.4* 26.9   BUN mg/dL 42* 34* 33*   CREATININE mg/dL 0.84 0.61* 0.73*   GLUCOSE mg/dL 208* 105* 166*   CALCIUM mg/dL 8.4* 8.6 9.2   AST (SGOT) U/L  --  42* 52*   ALT (SGPT) U/L  --  85* 96*       Results from last 7 days  Lab Units 10/13/18  0445 10/12/18  2041 10/11/18  1057 10/08/18  1701   TROPONIN T ng/mL 0.065* 0.071* 0.193* <0.010       Results from last 7 days  Lab Units 10/14/18  0520 10/13/18  0445   WBC 10*3/mm3 11.12* 15.65*   HEMOGLOBIN g/dL 13.7 14.0   HEMATOCRIT % 44.1 44.4   PLATELETS 10*3/mm3 263 252       Results from last 7 days  Lab Units 10/11/18  1057   INR  1.12*   APTT seconds 28.2       Results from last 7 days  Lab Units 10/13/18  0445  10/12/18  2041   MAGNESIUM mg/dL 2.3 2.4           Invalid input(s): LDLCALC    Results from last 7 days  Lab Units 10/13/18  0445 10/08/18  1701   PROBNP pg/mL 29,181.0* 536.5         I reviewed the patient's new clinical results.  I personally viewed and interpreted the patient's EKG  Current Medications:   Scheduled Meds:  acetylcysteine 4 mL Nebulization Q8H - RT   albuterol 6 puff Inhalation Q4H - RT   ceftriaxone 1 g Intravenous Q24H   doxycycline 100 mg Intravenous Q12H   enoxaparin 30 mg Subcutaneous Q24H   famotidine 20 mg Intravenous Q12H   insulin aspart 0-9 Units Subcutaneous Q6H   methylPREDNISolone sodium succinate 40 mg Intravenous Q12H   metoprolol tartrate 5 mg Intravenous Q4H   nicotine 1 patch Transdermal Q24H   thiamine (VITAMIN B1) IVPB 100 mg Intravenous Daily     Continuous Infusions:  amiodarone 1 mg/min Last Rate: 1 mg/min (10/13/18 0136)   amiodarone 0.5 mg/min Last Rate: 0.5 mg/min (10/14/18 1041)   lidocaine cardiac 0.5 mg/min Last Rate: Stopped (10/14/18 1041)   norepinephrine 0.02-0.3 mcg/kg/min Last Rate: 0.12 mcg/kg/min (10/14/18 0613)   propofol 5-50 mcg/kg/min Last Rate: 20 mcg/kg/min (10/14/18 0822)       Allergies:  No Known Allergies    Assessment/Plan:     1. Recurrent ventricular fibrillation arrest.  Troponin in indeterminate range but trending down.  Likely from recurrent arrest.  Suspect vfib from cardiomyopathy but can not rule out ischemia.  Not a good candidate for cardiac catheterization at this point.  Stable on amio gtt, lido gtt, and IV metoprolol  2. Cardiomypathy. EF of 10%.  May be tachycardia mediated. May consider ischemic work up at some point depending on clinical course.   3. Atrial fibrillation.  Currently in sinus rhythm on amiodarone.   4. Acute hypoxic respiratory failure. Re-intubated overnight.   5. Shock.  On levophed.   6. Left lower lobe pneumonia  7. Severe COPD  8. Tobacco use  9. Etoh abuse     -  Will try off lidocaine gtt today  -  Continue  amiodarone gtt and IV metoprolol.   -  Will give a dose of furosemide today to see if will help with weaning off vent    Spring Martin MD  10/14/18  10:54 AM

## 2018-10-14 NOTE — PLAN OF CARE
Problem: Patient Care Overview  Goal: Plan of Care Review  Outcome: Ongoing (interventions implemented as appropriate)   10/14/18 0443   Coping/Psychosocial   Plan of Care Reviewed With patient   OTHER   Outcome Summary Pt reamins in CCU overnight on vent, levophed, amiodarone, lidocaine, and propofol at 45mcg/kg/min.  Pt remains hypotensive and denies pain.  Weak peripheral pulses.  When awake he follows commands.  Held metoprolol through the night due to pt being hypotensive and on pressors.        Plan of Care Review   Progress no change

## 2018-10-14 NOTE — PROGRESS NOTES
Long discussion with the patient's family.  All options discussed.  They're leaning towards palliative care.  Nurse Alexander informed me that patient's family has decided to proceed with palliative care.

## 2018-10-14 NOTE — PROGRESS NOTES
"      [facility]         Dr Maldonado Sayied   LOS: 6 days   Patient Care Team:  Jaime Chung MD as PCP - General  Jaime Chung MD as PCP - Family Medicine  [facility], Gerardo Cardoza MD as PCP - Claims Attributed    Chief Complaint: Status post CODE BLUE and reintubated on 10/12/18 with acute respiratory failure multifactorial shock cardiomyopathy EF 10% with left lower lobe pneumonia and end-stage COPD/cachexia. complicated by tobacco abuse and alcohol abuse.    Interval History: Sedated intubated.  Wakes up follow simple commands.  I tried spontaneous breathing trial and patient did not tolerate well.  Remains on sedation.    REVIEW OF SYSTEMS:   Sedated intubated.    Ventilator/Non-Invasive Ventilation Settings     Start     Ordered for oral      Full vent support on assist control FiO2 40% PEEP of 5    Vital Signs  Heart Rate:  [48-69] 56  Resp:  [14-21] 20  BP: ()/(56-84) 123/80  FiO2 (%):  [40 %-50 %] 40 %    Intake/Output Summary (Last 24 hours) at 10/14/18 0947  Last data filed at 10/14/18 0620   Gross per 24 hour   Intake          1675.44 ml   Output             1250 ml   Net           425.44 ml     Flowsheet Rows      First Filed Value   Admission Height  180.3 cm (71\") Documented at 10/08/2018 1659   Admission Weight  54 kg (119 lb) Documented at 10/08/2018 1746          Physical Exam:   General Appearance:    Sedated intubated.  ET tube good position orally.     Lungs:     Diminished breath sounds bilaterally     Heart:    Regular rhythm and normal rate, normal S1 and S2, no            murmur, no gallop, no rub, no click   Chest Wall:    No abnormalities observed   Abdomen:     Normal bowel sounds, no masses, no organomegaly, soft        non-tender, non-distended, no guarding, no rebound                tenderness   Extremities:   1+ edema, no cyanosis, no             redness  CNS moving all extremities sedated intubated      Results Review:          Results from last 7 days  Lab Units " 10/14/18  0520 10/13/18  0445 10/12/18  2041   SODIUM mmol/L 143 143 142   POTASSIUM mmol/L 4.0 3.8 4.2   CHLORIDE mmol/L 101 101 101   CO2 mmol/L 29.1* 29.4* 26.9   BUN mg/dL 42* 34* 33*   CREATININE mg/dL 0.84 0.61* 0.73*   GLUCOSE mg/dL 208* 105* 166*   CALCIUM mg/dL 8.4* 8.6 9.2       Results from last 7 days  Lab Units 10/13/18  0445 10/12/18  2041 10/11/18  1057   TROPONIN T ng/mL 0.065* 0.071* 0.193*       Results from last 7 days  Lab Units 10/14/18  0520 10/13/18  0445 10/11/18  1815   WBC 10*3/mm3 11.12* 15.65* 14.76*   HEMOGLOBIN g/dL 13.7 14.0 13.5*   HEMATOCRIT % 44.1 44.4 41.5   PLATELETS 10*3/mm3 263 252 270       Results from last 7 days  Lab Units 10/11/18  1057   INR  1.12*   APTT seconds 28.2           Results from last 7 days  Lab Units 10/13/18  0445   MAGNESIUM mg/dL 2.3           Results from last 7 days  Lab Units 10/13/18  0408   PH, ARTERIAL pH units 7.423   PO2 ART mm Hg 83.7   PCO2, ARTERIAL mm Hg 48.3*   HCO3 ART mmol/L 31.5*       I reviewed the patient's new clinical results.  I personally viewed and interpreted the patient's CXR        Medication Review:     acetylcysteine 4 mL Nebulization Q8H - RT   albuterol 6 puff Inhalation Q4H - RT   ceftriaxone 1 g Intravenous Q24H   doxycycline 100 mg Intravenous Q12H   enoxaparin 30 mg Subcutaneous Q24H   famotidine 20 mg Intravenous Q12H   insulin aspart 0-9 Units Subcutaneous Q6H   methylPREDNISolone sodium succinate 40 mg Intravenous Q12H   metoprolol tartrate 5 mg Intravenous Q4H   nicotine 1 patch Transdermal Q24H         amiodarone 1 mg/min Last Rate: 1 mg/min (10/13/18 0136)   amiodarone 0.5 mg/min    lidocaine cardiac 0.5 mg/min Last Rate: 0.5 mg/min (10/13/18 0400)   norepinephrine 0.02-0.3 mcg/kg/min Last Rate: 0.12 mcg/kg/min (10/14/18 0684)   propofol 5-50 mcg/kg/min Last Rate: 20 mcg/kg/min (10/14/18 0822)       ASSESSMENT:   Status post V. fib cardiac arrest  Acute respiratory failure, vent 10/11/18  Shock,  multifactorial  Cardiomyopathy - EF 10%  Atrial fibrillation with rapid ventricular rate  Left lower lobe pneumonia  Right middle lobe atelectasis  Acute respiratory failure, hypoxia  COPD cachexia  Severe COPD with exacerbation  Current tobacco use  Current alcohol use  Confusion    PLAN:  Amiodarone and lidocaine drip to continue per cardiology.  No further V. tach/V. fib noted  Continue vent support.  Failed SBT trial today  Medical management for V. fib arrest per cardiology  Continue antibiotics for left lower lobe pneumonia  Continue steroids.  Wean as tolerated  Continue supportive care  Currently no family at bedside  Overall prognosis poor due to multiple medical problems and issues  Awaiting family to arrive to discuss goals of care.      Joel Ballesteros MD  10/14/18  9:47 AM      Time: Critical care 35 min

## 2018-10-15 NOTE — PROGRESS NOTES
Discharge Planning Assessment  [facility]     Patient Name: Parth Woody  MRN: [id number]  Today's Date: 10/15/2018    Admit Date: 10/8/2018          Discharge Needs Assessment    No documentation.             Discharge Plan     Row Name 10/15/18 1531       Plan    Plan Comments The patient transferred to [facility] from CCU on 10/14/18 @ 16:40. The patient is palliative. [facility]. STEPHANIE Crandall RN, CCP.         Destination     No service coordination in this encounter.      Durable Medical Equipment     No service coordination in this encounter.      Dialysis/Infusion     No service coordination in this encounter.      Home Medical Care     No service coordination in this encounter.      Social Care     No service coordination in this encounter.                Demographic Summary    No documentation.           Functional Status    No documentation.           Psychosocial    No documentation.           Abuse/Neglect    No documentation.           Legal    No documentation.           Substance Abuse    No documentation.           Patient Forms    No documentation.         Jacqueline Crandall RN

## 2018-10-15 NOTE — PLAN OF CARE
Problem: Patient Care Overview  Goal: Plan of Care Review  Outcome: Ongoing (interventions implemented as appropriate)   10/15/18 1600   Coping/Psychosocial   Plan of Care Reviewed With patient   OTHER   Outcome Summary PRN meds admin for sx of discomfort, sx improve/resolve after meds. Pt awake since approx 0100 and denies c/o pain/anxiety/restlessness. Continue to monitor and tx per POc and MD orders.    Plan of Care Review   Progress declining       Problem: Fall Risk (Adult)  Goal: Absence of Fall  Outcome: Ongoing (interventions implemented as appropriate)      Problem: Skin Injury Risk (Adult)  Goal: Skin Health and Integrity  Outcome: Ongoing (interventions implemented as appropriate)      Problem: Palliative Care (Adult)  Goal: Identify Related Risk Factors and Signs and Symptoms  Outcome: Outcome(s) achieved Date Met: 10/15/18    Goal: Maximized Comfort  Outcome: Ongoing (interventions implemented as appropriate)    Goal: Enhanced Quality of Life  Outcome: Ongoing (interventions implemented as appropriate)

## 2018-10-15 NOTE — PROGRESS NOTES
Hospital Follow Up    Chief Complaint: Follow up ventricular fibrillation arrest, afib, cardiomyopathy    Interval History:  The patient has been transferred to palliative care at the family's request.    Objective:     Objective:  Temp:  [97 °F (36.1 °C)] 97 °F (36.1 °C)  Heart Rate:  [51-74] 74  Resp:  [18-20] 18  BP: ()/(53-75) 127/72  FiO2 (%):  [40 %] 40 %     Intake/Output Summary (Last 24 hours) at 10/15/18 1040  Last data filed at 10/15/18 0926   Gross per 24 hour   Intake             1600 ml   Output              300 ml   Net             1300 ml     Body mass index is 15.65 kg/m².  1    10/09/18  0500 10/11/18  1732 10/14/18  0600   Weight: 52.2 kg (115 lb) 52.2 kg (115 lb) 50.9 kg (112 lb 3.4 oz)     Weight change:         Lab Review:     Results from last 7 days  Lab Units 10/14/18  0520 10/13/18  0445 10/12/18  2041   SODIUM mmol/L 143 143 142   POTASSIUM mmol/L 4.0 3.8 4.2   CHLORIDE mmol/L 101 101 101   CO2 mmol/L 29.1* 29.4* 26.9   BUN mg/dL 42* 34* 33*   CREATININE mg/dL 0.84 0.61* 0.73*   GLUCOSE mg/dL 208* 105* 166*   CALCIUM mg/dL 8.4* 8.6 9.2   AST (SGOT) U/L  --  42* 52*   ALT (SGPT) U/L  --  85* 96*       Results from last 7 days  Lab Units 10/13/18  0445 10/12/18  2041 10/11/18  1057 10/08/18  1701   TROPONIN T ng/mL 0.065* 0.071* 0.193* <0.010       Results from last 7 days  Lab Units 10/14/18  0520 10/13/18  0445   WBC 10*3/mm3 11.12* 15.65*   HEMOGLOBIN g/dL 13.7 14.0   HEMATOCRIT % 44.1 44.4   PLATELETS 10*3/mm3 263 252       Results from last 7 days  Lab Units 10/11/18  1057   INR  1.12*   APTT seconds 28.2       Results from last 7 days  Lab Units 10/13/18  0445 10/12/18  2041   MAGNESIUM mg/dL 2.3 2.4           Invalid input(s): LDLCALC    Results from last 7 days  Lab Units 10/13/18  0445 10/08/18  1701   PROBNP pg/mL 29,181.0* 536.5         I reviewed the patient's new clinical results.  I personally viewed and interpreted the patient's EKG  Current Medications:   Scheduled  Meds:   Continuous Infusions:     Allergies:  No Known Allergies    Assessment/Plan:     1. Severe COPD  2. End-stage cardiomyopathy  3. Recurrent VF arrest.    Now on palliative care.  I will see as needed.    James Dewitt MD  10/15/18  10:40 AM

## 2018-10-15 NOTE — PROGRESS NOTES
"DAILY PROGRESS NOTE  [facility]    Patient Identification:  Name: Parth Woody  Age: 78 y.o.  Sex: male  :  1940  MRN: [id number]         Primary Care Physician: Jaime Chung MD    Subjective:  Interval History: He is very confused and lethargic.    Objective:    Scheduled Meds:   Continuous Infusions:     Vital signs in last 24 hours:  Temp:  [97 °F (36.1 °C)] 97 °F (36.1 °C)  Heart Rate:  [51-74] 74  Resp:  [18-20] 18  BP: ()/(53-74) 127/72    Intake/Output:    Intake/Output Summary (Last 24 hours) at 10/15/18 1126  Last data filed at 10/15/18 0926   Gross per 24 hour   Intake             1600 ml   Output              300 ml   Net             1300 ml       Exam:  /72 (BP Location: Left arm, Patient Position: Sitting)   Pulse 74   Temp 97 °F (36.1 °C) (Oral)   Resp 18   Ht 180.3 cm (71\")   Wt 50.9 kg (112 lb 3.4 oz)   SpO2 (!) 85%   BMI 15.65 kg/m²     General Appearance:    Confused, no distress   Head:    Normocephalic, without obvious abnormality, atraumatic   Eyes:       Throat:   Lips, tongue, gums normal   Neck:   Supple, symmetrical, trachea midline, no JVD   Lungs:     Clear to auscultation bilaterally, respirations unlabored   Chest Wall:    No tenderness or deformity    Heart:    Regular rate and rhythm, S1 and S2 normal, no murmur,no  Rub or gallop   Abdomen:     Soft, non-tender, bowel sounds active, no masses, no organomegaly    Extremities:   Extremities normal, atraumatic, no cyanosis or edema   Pulses:      Skin:   Skin is warm and dry,  no rashes or palpable lesions   Neurologic:   Confused       Lab Results (last 72 hours)     Procedure Component Value Units Date/Time    Blood Culture - Blood, Blood, Venous Line [[id number]]  (Normal) Collected:  10/11/18 1403    Specimen:  Blood from Arm, Left Updated:  10/14/18 1431     Blood Culture No growth at 3 days    Blood Culture - Blood, Blood, Central Line [[id number]]  (Normal) Collected:  10/11/18 1402    " Specimen:  Blood from Blood, Central Line Updated:  10/14/18 1431     Blood Culture No growth at 3 days    POC Glucose Once [[id number]]  (Abnormal) Collected:  10/14/18 1143    Specimen:  Blood Updated:  10/14/18 1145     Glucose 173 (H) mg/dL     Narrative:       Meter: [id number] : [id number] Ray Munoz RN    Basic Metabolic Panel [[id number]]  (Abnormal) Collected:  10/14/18 0520    Specimen:  Blood Updated:  10/14/18 0619     Glucose 208 (H) mg/dL      BUN 42 (H) mg/dL      Creatinine 0.84 mg/dL      Sodium 143 mmol/L      Potassium 4.0 mmol/L      Chloride 101 mmol/L      CO2 29.1 (H) mmol/L      Calcium 8.4 (L) mg/dL      eGFR Non African Amer 89 mL/min/1.73      BUN/Creatinine Ratio 50.0 (H)     Anion Gap 12.9 mmol/L     Narrative:       The MDRD GFR formula is only valid for adults with stable renal function between ages 18 and 70.    CBC (No Diff) [[id number]]  (Abnormal) Collected:  10/14/18 0520    Specimen:  Blood Updated:  10/14/18 0557     WBC 11.12 (H) 10*3/mm3      RBC 4.92 10*6/mm3      Hemoglobin 13.7 g/dL      Hematocrit 44.1 %      MCV 89.6 fL      MCH 27.8 pg      MCHC 31.1 (L) g/dL      RDW 15.0 (H) %      RDW-SD 49.0 fl      MPV 10.6 fL      Platelets 263 10*3/mm3     POC Glucose Once [[id number]]  (Abnormal) Collected:  10/14/18 0519    Specimen:  Blood Updated:  10/14/18 0520     Glucose 198 (H) mg/dL     Narrative:       Meter: [id number] : [id number] Hubert Olson RN    POC Glucose Once [[id number]]  (Abnormal) Collected:  10/13/18 2359    Specimen:  Blood Updated:  10/14/18 0009     Glucose 143 (H) mg/dL     Narrative:       Meter: [id number] : [id number] Olson RN    Blood Culture - Blood, [[id number]]  (Normal) Collected:  10/08/18 1849    Specimen:  Blood from Arm, Right Updated:  10/13/18 1900     Blood Culture No growth at 5 days    POC Glucose Once [[id number]]  (Abnormal) Collected:  10/13/18 1800    Specimen:  Blood Updated:  10/13/18 1801     Glucose 147 (H) mg/dL      Narrative:       Meter: [id number] : [id number] Tyler Valentino NA    Blood Culture - Blood, [[id number]]  (Normal) Collected:  10/08/18 1709    Specimen:  Blood from Arm, Left Updated:  10/13/18 1715     Blood Culture No growth at 5 days    POC Glucose Once [[id number]]  (Normal) Collected:  10/13/18 1204    Specimen:  Blood Updated:  10/13/18 1206     Glucose 120 mg/dL     Narrative:       Meter: [id number] : [id number] Natalie Puga NA    BAL Culture, Quantitative - Lavage, Lung, R [[id number]]  (Normal) Collected:  10/11/18 1222    Specimen:  Lavage from Lung, R Updated:  10/13/18 0741     BAL Culture No growth at 2 days     Gram Stain Result No epithelial cells seen      Many (4+) WBCs seen      No organisms seen    BNP [[id number]]  (Abnormal) Collected:  10/13/18 0445    Specimen:  Blood Updated:  10/13/18 0544     proBNP 29,181.0 (H) pg/mL     Narrative:       Among patients with dyspnea, NT-proBNP is highly sensitive for the detection of acute congestive heart failure. In addition NT-proBNP of <300 pg/ml effectively rules out acute congestive heart failure with 99% negative predictive value.    POC Glucose Once [[id number]]  (Normal) Collected:  10/13/18 0536    Specimen:  Blood Updated:  10/13/18 0538     Glucose 104 mg/dL     Narrative:       Meter: [id number] : [id number] Reentta LACEY    Troponin [[id number]]  (Abnormal) Collected:  10/13/18 0445    Specimen:  Blood Updated:  10/13/18 0535     Troponin T 0.065 (H) ng/mL     Narrative:       Troponin T Reference Ranges:  Less than 0.03 ng/mL:    Negative for AMI  0.03 to 0.09 ng/mL:      Indeterminant for AMI  Greater than 0.09 ng/mL: Positive for AMI    Comprehensive Metabolic Panel [[id number]]  (Abnormal) Collected:  10/13/18 0445    Specimen:  Blood Updated:  10/13/18 0534     Glucose 105 (H) mg/dL      BUN 34 (H) mg/dL      Creatinine 0.61 (L) mg/dL      Sodium 143 mmol/L      Potassium 3.8 mmol/L      Chloride 101 mmol/L      CO2 29.4  (H) mmol/L      Calcium 8.6 mg/dL      Total Protein 5.8 (L) g/dL      Albumin 3.20 (L) g/dL      ALT (SGPT) 85 (H) U/L      AST (SGOT) 42 (H) U/L      Alkaline Phosphatase 66 U/L      Total Bilirubin <0.2 mg/dL      eGFR Non African Amer 128 mL/min/1.73      Globulin 2.6 gm/dL      A/G Ratio 1.2 g/dL      BUN/Creatinine Ratio 55.7 (H)     Anion Gap 12.6 mmol/L     Narrative:       The MDRD GFR formula is only valid for adults with stable renal function between ages 18 and 70.    Magnesium [[id number]]  (Normal) Collected:  10/13/18 0445    Specimen:  Blood Updated:  10/13/18 0530     Magnesium 2.3 mg/dL     CBC (No Diff) [[id number]]  (Abnormal) Collected:  10/13/18 0445    Specimen:  Blood Updated:  10/13/18 0515     WBC 15.65 (H) 10*3/mm3      RBC 4.93 10*6/mm3      Hemoglobin 14.0 g/dL      Hematocrit 44.4 %      MCV 90.1 fL      MCH 28.4 pg      MCHC 31.5 (L) g/dL      RDW 15.0 (H) %      RDW-SD 49.0 fl      MPV 11.0 fL      Platelets 252 10*3/mm3     Blood Gas, Arterial [[id number]]  (Abnormal) Collected:  10/13/18 0408    Specimen:  Arterial Blood Updated:  10/13/18 0410     Site Arterial: right radial     Richi's Test N/A     pH, Arterial 7.423 pH units      pCO2, Arterial 48.3 (H) mm Hg      pO2, Arterial 83.7 mm Hg      HCO3, Arterial 31.5 (H) mmol/L      Base Excess, Arterial 5.9 (H) mmol/L      O2 Saturation Calculated 96.3 %      A-a Gradiant 0.2 mmHg      Barometric Pressure for Blood Gas 754.6 mmHg      Modality Adult Vent     FIO2 60 %      Ventilator Mode PC     Set [facility] Resp Rate 14     Rate 23 Breaths/minute      PEEP 5    Narrative:       IP 15 Meter: [id number] : [id number] Chad Nice    POC Glucose Once [[id number]]  (Abnormal) Collected:  10/13/18 0008    Specimen:  Blood Updated:  10/13/18 0026     Glucose 246 (H) mg/dL     Narrative:       Meter: [id number] : [id number] Renetta LACEY    Troponin [[id number]]  (Abnormal) Collected:  10/12/18 2041    Specimen:  Blood Updated:   10/12/18 2133     Troponin T 0.071 (H) ng/mL     Narrative:       Troponin T Reference Ranges:  Less than 0.03 ng/mL:    Negative for AMI  0.03 to 0.09 ng/mL:      Indeterminant for AMI  Greater than 0.09 ng/mL: Positive for AMI    Comprehensive Metabolic Panel [[id number]]  (Abnormal) Collected:  10/12/18 2041    Specimen:  Blood Updated:  10/12/18 2119     Glucose 166 (H) mg/dL      BUN 33 (H) mg/dL      Creatinine 0.73 (L) mg/dL      Sodium 142 mmol/L      Potassium 4.2 mmol/L      Chloride 101 mmol/L      CO2 26.9 mmol/L      Calcium 9.2 mg/dL      Total Protein 6.6 g/dL      Albumin 3.50 g/dL      ALT (SGPT) 96 (H) U/L      AST (SGOT) 52 (H) U/L      Alkaline Phosphatase 77 U/L      Total Bilirubin 0.2 mg/dL      eGFR Non African Amer 104 mL/min/1.73      Globulin 3.1 gm/dL      A/G Ratio 1.1 g/dL      BUN/Creatinine Ratio 45.2 (H)     Anion Gap 14.1 mmol/L     Narrative:       The MDRD GFR formula is only valid for adults with stable renal function between ages 18 and 70.    Magnesium [[id number]]  (Normal) Collected:  10/12/18 2041    Specimen:  Blood Updated:  10/12/18 2117     Magnesium 2.4 mg/dL     POC Glucose Once [[id number]]  (Abnormal) Collected:  10/12/18 2051    Specimen:  Blood Updated:  10/12/18 2053     Glucose 186 (H) mg/dL     Narrative:       Meter: [id number] : [id number] Renetta LACEY    POC Glucose Once [[id number]]  (Normal) Collected:  10/12/18 1634    Specimen:  Blood Updated:  10/12/18 1637     Glucose 117 mg/dL     Narrative:       Meter: [id number] : [id number] Chelsea LACEY    AFB Culture - Lavage, Lung, R [[id number]] Collected:  10/11/18 1222    Specimen:  Lavage from Lung, R Updated:  10/12/18 1400     AFB Stain No acid fast bacilli seen on concentrated smear    Blood Gas, Arterial [[id number]]  (Abnormal) Collected:  10/12/18 1239    Specimen:  Arterial Blood Updated:  10/12/18 1244     Site Arterial: right radial     Richi's Test Positive     pH, Arterial 7.404 pH  units      pCO2, Arterial 41.3 mm Hg      pO2, Arterial 75.6 (L) mm Hg      HCO3, Arterial 25.8 mmol/L      Base Excess, Arterial 0.9 mmol/L      O2 Saturation Calculated 95.0 %      A-a Gradiant 0.4 mmHg      Barometric Pressure for Blood Gas 753.8 mmHg      Modality Adult Vent     FIO2 30 %      Ventilator Mode PC/PS     Rate 27 Breaths/minute      PEEP 5     PSV 8 cmH2O     Narrative:       PS 8 5 30% Meter: [id number] : [id number] Roscoe Leonard        Data Review:    Results from last 7 days  Lab Units 10/14/18  0520 10/13/18  0445 10/12/18  2041   SODIUM mmol/L 143 143 142   POTASSIUM mmol/L 4.0 3.8 4.2   CHLORIDE mmol/L 101 101 101   CO2 mmol/L 29.1* 29.4* 26.9   BUN mg/dL 42* 34* 33*   CREATININE mg/dL 0.84 0.61* 0.73*   GLUCOSE mg/dL 208* 105* 166*   CALCIUM mg/dL 8.4* 8.6 9.2       Results from last 7 days  Lab Units 10/14/18  0520 10/13/18  0445 10/11/18  1815   WBC 10*3/mm3 11.12* 15.65* 14.76*   HEMOGLOBIN g/dL 13.7 14.0 13.5*   HEMATOCRIT % 44.1 44.4 41.5   PLATELETS 10*3/mm3 263 252 270               Lab Results  Lab Value Date/Time   TROPONINT 0.065 (H) 10/13/2018 0445   TROPONINT 0.071 (H) 10/12/2018 2041   TROPONINT 0.193 (C) 10/11/2018 1057   TROPONINT <0.010 10/08/2018 1701           Results from last 7 days  Lab Units 10/13/18  0445 10/12/18  2041 10/11/18  1815   ALK PHOS U/L 66 77 71   BILIRUBIN mg/dL <0.2 0.2 0.2   ALT (SGPT) U/L 85* 96* 123*   AST (SGOT) U/L 42* 52* 123*             Glucose   Date/Time Value Ref Range Status   10/14/2018 1143 173 (H) 70 - 130 mg/dL Final   10/14/2018 0519 198 (H) 70 - 130 mg/dL Final   10/13/2018 2359 143 (H) 70 - 130 mg/dL Final   10/13/2018 1800 147 (H) 70 - 130 mg/dL Final   10/13/2018 1204 120 70 - 130 mg/dL Final   10/13/2018 0536 104 70 - 130 mg/dL Final   10/13/2018 0008 246 (H) 70 - 130 mg/dL Final   10/12/2018 2051 186 (H) 70 - 130 mg/dL Final       Results from last 7 days  Lab Units 10/11/18  1057   INR  1.12*       Patient Active Problem  List   Diagnosis Code   • COPD exacerbation (CMS/HCC) J44.1   • Pneumonia J18.9   • Tobacco abuse Z72.0   • Acute hypoxemic respiratory failure (CMS/HCC) J96.01   • Severe malnutrition due to chronic illness E43   • Sepsis (CMS/HCC) A41.9   • Atelectasis of right lung J98.11   • Cardiac arrest (CMS/HCC) I46.9   • Ventricular fibrillation (CMS/[facility]) I49.01   • Cardiomyopathy (CMS/HCC) I42.9       Assessment:  Active Hospital Problems    Diagnosis Date Noted   • **Pneumonia [J18.9] 10/08/2018   • Cardiac arrest (CMS/HCC) [I46.9] 10/13/2018   • Ventricular fibrillation (CMS/HCC) [I49.01] 10/13/2018   • Cardiomyopathy (CMS/HCC) [I42.9] 10/13/2018   • Sepsis (CMS/[facility]) [A41.9] 10/10/2018   • Atelectasis of right lung [J98.11] 10/10/2018   • Severe malnutrition due to chronic illness [E43] 10/09/2018   • COPD exacerbation (CMS/HCC) [J44.1] 10/08/2018   • Tobacco abuse [Z72.0] 10/08/2018   • Acute hypoxemic respiratory failure (CMS/HCC) [J96.01] 10/08/2018      Resolved Hospital Problems    Diagnosis Date Noted Date Resolved   No resolved problems to display.       Plan:  Will continue with comfort care and get hospice consult.  Have discussed plans with nurse.    Aashish Mtz MD  10/15/2018  11:26 AM

## 2018-10-15 NOTE — NURSING NOTE
Patient transferred to the palliative care unit. Family state goal of care is comfort.The palliative care team will continue to follow for any further needs.

## 2018-10-15 NOTE — PLAN OF CARE
Problem: Patient Care Overview  Goal: Plan of Care Review  Outcome: Ongoing (interventions implemented as appropriate)   10/15/18 1616   Coping/Psychosocial   Plan of Care Reviewed With family   OTHER   Outcome Summary Pt congested and agitated this AM, started premedicating for turns Robinul, 1 ativan 4 morphine. Scop patch placed, Hospice to see at 18:30, will flip to HSB today. Will continue to monitor    Plan of Care Review   Progress declining     Goal: Individualization and Mutuality  Outcome: Ongoing (interventions implemented as appropriate)      Problem: Fall Risk (Adult)  Goal: Absence of Fall  Outcome: Ongoing (interventions implemented as appropriate)      Problem: Skin Injury Risk (Adult)  Goal: Skin Health and Integrity  Outcome: Ongoing (interventions implemented as appropriate)      Problem: Palliative Care (Adult)  Goal: Maximized Comfort  Outcome: Ongoing (interventions implemented as appropriate)    Goal: Enhanced Quality of Life  Outcome: Ongoing (interventions implemented as appropriate)

## 2018-10-16 PROBLEM — Z51.5 ADMISSION FOR HOSPICE CARE: Status: ACTIVE | Noted: 2018-01-01

## 2018-10-16 NOTE — DISCHARGE SUMMARY
PHYSICIAN DISCHARGE SUMMARY                                                                        [facility]    Patient Identification:  Name: Parth Woody  Age: 78 y.o.  Sex: male  :  1940  MRN: [id number]  Primary Care Physician: Jaime Chung MD    Admit date: 10/8/2018  Discharge date and time:10/16/2018  Discharged Condition: Terminal    Discharge Diagnoses:  Active Hospital Problems    Diagnosis Date Noted   • **Pneumonia [J18.9] 10/08/2018   • Cardiac arrest (CMS/HCC) [I46.9] 10/13/2018   • Ventricular fibrillation (CMS/HCC) [I49.01] 10/13/2018   • Cardiomyopathy (CMS/HCC) [I42.9] 10/13/2018   • Sepsis (CMS/HCC) [A41.9] 10/10/2018   • Atelectasis of right lung [J98.11] 10/10/2018   • Severe malnutrition due to chronic illness [E43] 10/09/2018   • COPD exacerbation (CMS/HCC) [J44.1] 10/08/2018   • Tobacco abuse [Z72.0] 10/08/2018   • Acute hypoxemic respiratory failure (CMS/HCC) [J96.01] 10/08/2018      Resolved Hospital Problems    Diagnosis Date Noted Date Resolved   No resolved problems to display.      Patient Active Problem List   Diagnosis Code   • COPD exacerbation (CMS/HCC) J44.1   • Pneumonia J18.9   • Tobacco abuse Z72.0   • Acute hypoxemic respiratory failure (CMS/HCC) J96.01   • Severe malnutrition due to chronic illness E43   • Sepsis (CMS/HCC) A41.9   • Atelectasis of right lung J98.11   • Cardiac arrest (CMS/HCC) I46.9   • Ventricular fibrillation (CMS/HCC) I49.01   • Cardiomyopathy (CMS/HCC) I42.9       PMHX:   Past Medical History:   Diagnosis Date   • COPD (chronic obstructive pulmonary disease) (CMS/HCC)      PSHX:   Past Surgical History:   Procedure Laterality Date   • BRONCHOSCOPY N/A 10/11/2018    Procedure: BRONCHOSCOPY AT BEDSIDE with BAL;  Surgeon: Vinny Campos MD;  Location: [facility];  Service: Pulmonary   • CERVICAL SPINE SURGERY     • PROSTATECTOMY         Hospital Course: Parth LAURA  Bong  is a 78-year-old now with history of COPD and tobacco abuse who takes no medications at home and does not go to the doctor.  He presented to the emergency room with a 3 month history of shortness of breath that he states has become dramatically worse over the past 2 days.  He has developed an associated productive cough of yellowish sputum.  He had a low-grade fever upon presentation.  He states that his shortness of breath is constant.  He denies any chest pain or pressure.  He denies any nausea or vomiting.  He smokes 1 pack per day.  Workup in the emergency room revealed a left lower lobe pneumonia.    His only other complaint was a brief episode of loose stools this morning which has resolved.  He denies abdominal pain.       The patient was admitted to hospital and was in critical care unit.  Patient had coded and was on A ventilator and doing poorly.  After being in the hospital for several days family agreed to palliative care the patient was transferred to palliative care unit for comfort measures.  Hospice was consulted and he was felt appropriate to be switched to hospice scattered bed for continued comfort care.    Consults:     Consults     Date and Time Order Name Status Description    10/11/2018 1514 Inpatient Cardiology Consult Completed     10/8/2018 2303 Inpatient Pulmonology Consult      10/8/2018 1832 [facility] (on-call MD unless specified) Completed           Results from last 7 days  Lab Units 10/14/18  0520   WBC 10*3/mm3 11.12*   HEMOGLOBIN g/dL 13.7   HEMATOCRIT % 44.1   PLATELETS 10*3/mm3 263       Results from last 7 days  Lab Units 10/14/18  0520   SODIUM mmol/L 143   POTASSIUM mmol/L 4.0   CHLORIDE mmol/L 101   CO2 mmol/L 29.1*   BUN mg/dL 42*   CREATININE mg/dL 0.84   GLUCOSE mg/dL 208*   CALCIUM mg/dL 8.4*     Significant Diagnostic Studies:   Lab Results   Component Value Date    WBC 11.12 (H) 10/14/2018    HGB 13.7 10/14/2018    HCT 44.1 10/14/2018     10/14/2018     Lab  Results   Component Value Date     10/14/2018    K 4.0 10/14/2018     10/14/2018    CO2 29.1 (H) 10/14/2018    BUN 42 (H) 10/14/2018    CREATININE 0.84 10/14/2018    GLUCOSE 208 (H) 10/14/2018     Lab Results   Component Value Date    CALCIUM 8.4 (L) 10/14/2018     No results found for: AST, ALT, ALKPHOS  No results found for: APTT, INR  No results found for: COLORU, CLARITYU, SPECGRAV, PHUR, PROTEINUR, GLUCOSEU, KETONESU, BLOODU, NITRITE, LEUKOCYTESUR, BILIRUBINUR, UROBILINOGEN, RBCUA, WBCUA, BACTERIA, UACOMMENT  No results found for: TROPONINT, TROPONINI, BNP  No components found for: HGBA1C;2  No components found for: TSH;2  Imaging Results (all)     Procedure Component Value Units Date/Time    CT Head Without Contrast [[id number]] Collected:  10/10/18 2205     Updated:  10/15/18 0541    Narrative:       CRANIAL CT SCAN WITHOUT CONTRAST     CLINICAL HISTORY: confusion; J44.1-Chronic obstructive pulmonary disease  with (acute) exacerbation; J96.01-Acute respiratory failure with  hypoxia; J18.1-Lobar pneumonia, unspecified organism     COMPARISON: None.     TECHNIQUE: Radiation dose reduction techniques were utilized, including  automated exposure control and exposure modulation based on body size.  Multiple axial images of the head were obtained without contrast.      FINDINGS:  There are no abnormal areas of increased density or mass  effect. Generalized atrophy. There are extensive scattered areas of  decreased density diffusely in the white matter likely related to  advanced chronic ischemic gliotic changes. There is a more confluent  focus of encephalomalacia in the inferior left frontal region which by  CT appears chronic.  Ventricles, sulci, and cisterns appear normal. Bone  window images are unremarkable.                Impression:       1. No acute intracranial abnormality.                     This report was finalized on 10/15/2018 5:38 AM by Lorne Delgadillo M.D.       XR Chest 1 View  [[id number]] Collected:  10/13/18 0306     Updated:  10/13/18 0311    Narrative:       PORTABLE CHEST X-RAY     CLINICAL HISTORY: fu chf; J44.1-Chronic obstructive pulmonary disease  with (acute) exacerbation; J96.01-Acute respiratory failure with  hypoxia; J18.1-Lobar pneumonia, unspecified organism     COMPARISON: 10/11/2018.     FINDINGS: Portable AP view of the chest was obtained with overlying  monitor leads in place. Life support lines are unchanged without  pneumothorax. The lungs are hyperexpanded suggesting COPD. There are  emphysematous and fibrotic changes present. There are calcified residua  of granulomatous disease present. Stable scarring in the right upper  lobe. Left lung base opacity is unchanged. There is new airspace disease  in the right lung base along the cardiophrenic angle which may be  related to atelectasis or pneumonia. No significant pleural fluid, at  least by portable imaging. Heart size and pulmonary vascularity are  normal.             Impression:       Emphysema with right greater than left basilar pulmonary  opacities as above        This report was finalized on 10/13/2018 3:08 AM by Lorne Delgadillo M.D.       XR Chest 1 View [[id number]] Collected:  10/11/18 1214     Updated:  10/12/18 0853    Narrative:       PORTABLE CHEST     HISTORY: Intubated, central line.     TECHNIQUE: 2 views of the chest were obtained, supine.     FINDINGS: An endotracheal tube is in place in satisfactory position  midway between the thoracic inlet and bonifacio. A right internal jugular  central line is in place superimposed over the superior vena cava. No  obvious pneumothorax is identified on the supine examination. The heart  is within normal limits in size. There is interstitial prominence and  mild vascular prominence in the perihilar regions bilaterally  nonspecific. This may be secondary to mild congestive changes. There is  atelectasis/infiltrate present at the left lung base. There is no  evidence of  consolidation or of gross effusion on this supine  examination.      The above information was discussed with Dr. Campos on 10/11/2018 at 1145  hours.     This report was finalized on 10/12/2018 8:50 AM by Dr. Kiet Montelongo M.D.       CT Angiogram Chest With Contrast [[id number]] Collected:  10/08/18 1919     Updated:  10/08/18 1933    Narrative:       CT ANGIOGRAM CHEST W CONTRAST-     INDICATIONS: Short of breath, fever, possible pulmonary embolism   Radiation dose reduction techniques were utilized, including automated  exposure control and exposure modulation based on body size.     TECHNIQUE: CT ANGIOGRAPHY OF THE CHEST WITH THREE-DIMENSIONAL  RECONSTRUCTIONS     COMPARISON: None available     FINDINGS:      No pulmonary embolism. No aortic dissection.     The heart size is normal, with small pericardial effusion, small  pericardial recess fluid. A few small subcentimeter short axis  mediastinal lymph nodes are seen that are not significant by size  criteria.     The airways appear clear.     No pleural effusion or pneumothorax.     The lungs again show extensive emphysematous changes, as well as a  partly calcified scarlike density in the right upper lobe that appears  similar to the prior exam. Patchy and consolidative opacities in the  left lower lobe suggest pneumonia, follow-up recommended to exclude any  possibility of underlying lesion. Mild likely atelectasis versus also  seen in the lingula and right lower lung.     Upper abdominal structures show hepatic cysts and low densities too  small to characterize, right renal cyst.     Degenerative changes are seen in the spine. Motion artifact somewhat  limits assessment of the bones, especially at the level of the sternum.     .       Impression:               1. No pulmonary embolism.  2. Left lower lobe pneumonia, follow-up recommended. Extensive  emphysematous and chronic changes of the lungs.     Discussed by telephone with Dr. Angulo at 1930,  "10/8/2018.     This report was finalized on 10/8/2018 7:30 PM by Dr. George Hernandez M.D.       XR Chest 1 View [[id number]] Collected:  10/08/18 1712     Updated:  10/08/18 1717    Narrative:       PORTABLE CHEST 10/8/2018 AT 1706 HOURS     CLINICAL HISTORY: Dyspnea. Cough. COPD.     The lungs are markedly hyperinflated with diminished markings in the  upper lung zones consistent with severe emphysema. There is patchy  infiltrate at the left lung base consistent with pneumonia. No pleural  effusions are identified. The heart is normal in size.     This report was finalized on 10/8/2018 5:14 PM by Dr. Billy Valladares M.D.           Lab Results (last 7 days)     Procedure Component Value Units Date/Time    Non-gynecologic Cytology [[id number]] Collected:  10/11/18 1222    Specimen:  Lavage from Lung, R Updated:  10/15/18 1619     Case Report --     Non-gynecologic Cytology                          Case: [id number]                                  Authorizing Provider:  Vinny Campos MD            Collected:           10/11/2018 12:22 PM          Ordering Location:     [facility]  Received:            10/12/2018 07:56 AM                                 CORONARY CARE                                                                Pathologist:           Jesus Lange MD                                                      Specimen:    Lung, R, BAL                                                                                Final Diagnosis --     \"RIGHT LUNG BAL\":         NEGATIVE FOR MALIGNANT CELLS.         BENIGN BRONCHIAL EPITHELIAL CELLS, HISTIOCYTES, MILD MIXED INFLAMMATION.           [facility]/    CPT CODES:   1. 99470        Gross Description --     10 ml cloudy fluid- 1 ThinPrep and no cell block       Microscopic Description --     Performed, incorporated in diagnosis.         Blood Culture - Blood, Blood, Venous Line [[id number]]  (Normal) Collected:  10/11/18 1403    Specimen:  Blood from " Arm, Left Updated:  10/15/18 1430     Blood Culture No growth at 4 days    Blood Culture - Blood, Blood, Central Line [[id number]]  (Normal) Collected:  10/11/18 1402    Specimen:  Blood from Blood, Central Line Updated:  10/15/18 1430     Blood Culture No growth at 4 days    POC Glucose Once [[id number]]  (Abnormal) Collected:  10/14/18 1143    Specimen:  Blood Updated:  10/14/18 1145     Glucose 173 (H) mg/dL     Narrative:       Meter: [id number] : [id number] Ray Munoz RN    Basic Metabolic Panel [[id number]]  (Abnormal) Collected:  10/14/18 0520    Specimen:  Blood Updated:  10/14/18 0619     Glucose 208 (H) mg/dL      BUN 42 (H) mg/dL      Creatinine 0.84 mg/dL      Sodium 143 mmol/L      Potassium 4.0 mmol/L      Chloride 101 mmol/L      CO2 29.1 (H) mmol/L      Calcium 8.4 (L) mg/dL      eGFR Non African Amer 89 mL/min/1.73      BUN/Creatinine Ratio 50.0 (H)     Anion Gap 12.9 mmol/L     Narrative:       The MDRD GFR formula is only valid for adults with stable renal function between ages 18 and 70.    CBC (No Diff) [[id number]]  (Abnormal) Collected:  10/14/18 0520    Specimen:  Blood Updated:  10/14/18 0557     WBC 11.12 (H) 10*3/mm3      RBC 4.92 10*6/mm3      Hemoglobin 13.7 g/dL      Hematocrit 44.1 %      MCV 89.6 fL      MCH 27.8 pg      MCHC 31.1 (L) g/dL      RDW 15.0 (H) %      RDW-SD 49.0 fl      MPV 10.6 fL      Platelets 263 10*3/mm3     POC Glucose Once [[id number]]  (Abnormal) Collected:  10/14/18 0519    Specimen:  Blood Updated:  10/14/18 0520     Glucose 198 (H) mg/dL     Narrative:       Meter: [id number] : [id number] Hubert Olson RN    POC Glucose Once [[id number]]  (Abnormal) Collected:  10/13/18 2359    Specimen:  Blood Updated:  10/14/18 0009     Glucose 143 (H) mg/dL     Narrative:       Meter: [id number] : [id number] Hubert Olson RN    Blood Culture - Blood, [[id number]]  (Normal) Collected:  10/08/18 1849    Specimen:  Blood from Arm, Right Updated:  10/13/18 1900     Blood  Culture No growth at 5 days    POC Glucose Once [[id number]]  (Abnormal) Collected:  10/13/18 1800    Specimen:  Blood Updated:  10/13/18 1801     Glucose 147 (H) mg/dL     Narrative:       Meter: [id number] : [id number] Tyler Ceronica BEATRIZ    Blood Culture - Blood, [[id number]]  (Normal) Collected:  10/08/18 1709    Specimen:  Blood from Arm, Left Updated:  10/13/18 1715     Blood Culture No growth at 5 days    POC Glucose Once [[id number]]  (Normal) Collected:  10/13/18 1204    Specimen:  Blood Updated:  10/13/18 1206     Glucose 120 mg/dL     Narrative:       Meter: [id number] : [id number] Natalie HENDERSON    BAL Culture, Quantitative - Lavage, Lung, R [[id number]]  (Normal) Collected:  10/11/18 1222    Specimen:  Lavage from Lung, R Updated:  10/13/18 0741     BAL Culture No growth at 2 days     Gram Stain Result No epithelial cells seen      Many (4+) WBCs seen      No organisms seen    BNP [[id number]]  (Abnormal) Collected:  10/13/18 0445    Specimen:  Blood Updated:  10/13/18 0544     proBNP 29,181.0 (H) pg/mL     Narrative:       Among patients with dyspnea, NT-proBNP is highly sensitive for the detection of acute congestive heart failure. In addition NT-proBNP of <300 pg/ml effectively rules out acute congestive heart failure with 99% negative predictive value.    POC Glucose Once [[id number]]  (Normal) Collected:  10/13/18 0536    Specimen:  Blood Updated:  10/13/18 0538     Glucose 104 mg/dL     Narrative:       Meter: [id number] : [id number] Renetta LACEY    Troponin [[id number]]  (Abnormal) Collected:  10/13/18 0445    Specimen:  Blood Updated:  10/13/18 0535     Troponin T 0.065 (H) ng/mL     Narrative:       Troponin T Reference Ranges:  Less than 0.03 ng/mL:    Negative for AMI  0.03 to 0.09 ng/mL:      Indeterminant for AMI  Greater than 0.09 ng/mL: Positive for AMI    Comprehensive Metabolic Panel [[id number]]  (Abnormal) Collected:  10/13/18 0445    Specimen:  Blood Updated:  10/13/18  0534     Glucose 105 (H) mg/dL      BUN 34 (H) mg/dL      Creatinine 0.61 (L) mg/dL      Sodium 143 mmol/L      Potassium 3.8 mmol/L      Chloride 101 mmol/L      CO2 29.4 (H) mmol/L      Calcium 8.6 mg/dL      Total Protein 5.8 (L) g/dL      Albumin 3.20 (L) g/dL      ALT (SGPT) 85 (H) U/L      AST (SGOT) 42 (H) U/L      Alkaline Phosphatase 66 U/L      Total Bilirubin <0.2 mg/dL      eGFR Non African Amer 128 mL/min/1.73      Globulin 2.6 gm/dL      A/G Ratio 1.2 g/dL      BUN/Creatinine Ratio 55.7 (H)     Anion Gap 12.6 mmol/L     Narrative:       The MDRD GFR formula is only valid for adults with stable renal function between ages 18 and 70.    Magnesium [[id number]]  (Normal) Collected:  10/13/18 0445    Specimen:  Blood Updated:  10/13/18 0530     Magnesium 2.3 mg/dL     CBC (No Diff) [[id number]]  (Abnormal) Collected:  10/13/18 0445    Specimen:  Blood Updated:  10/13/18 0515     WBC 15.65 (H) 10*3/mm3      RBC 4.93 10*6/mm3      Hemoglobin 14.0 g/dL      Hematocrit 44.4 %      MCV 90.1 fL      MCH 28.4 pg      MCHC 31.5 (L) g/dL      RDW 15.0 (H) %      RDW-SD 49.0 fl      MPV 11.0 fL      Platelets 252 10*3/mm3     Blood Gas, Arterial [[id number]]  (Abnormal) Collected:  10/13/18 0408    Specimen:  Arterial Blood Updated:  10/13/18 0410     Site Arterial: right radial     Richi's Test N/A     pH, Arterial 7.423 pH units      pCO2, Arterial 48.3 (H) mm Hg      pO2, Arterial 83.7 mm Hg      HCO3, Arterial 31.5 (H) mmol/L      Base Excess, Arterial 5.9 (H) mmol/L      O2 Saturation Calculated 96.3 %      A-a Gradiant 0.2 mmHg      Barometric Pressure for Blood Gas 754.6 mmHg      Modality Adult Vent     FIO2 60 %      Ventilator Mode PC     Set [facility] Resp Rate 14     Rate 23 Breaths/minute      PEEP 5    Narrative:       IP 15 Meter: [id number] : [id number] Chad Nice    POC Glucose Once [[id number]]  (Abnormal) Collected:  10/13/18 0008    Specimen:  Blood Updated:  10/13/18 0026     Glucose 246 (H)  mg/dL     Narrative:       Meter: [id number] : [id number] Renetat Phillips DEEPTHI    Troponin [[id number]]  (Abnormal) Collected:  10/12/18 2041    Specimen:  Blood Updated:  10/12/18 2133     Troponin T 0.071 (H) ng/mL     Narrative:       Troponin T Reference Ranges:  Less than 0.03 ng/mL:    Negative for AMI  0.03 to 0.09 ng/mL:      Indeterminant for AMI  Greater than 0.09 ng/mL: Positive for AMI    Comprehensive Metabolic Panel [[id number]]  (Abnormal) Collected:  10/12/18 2041    Specimen:  Blood Updated:  10/12/18 2119     Glucose 166 (H) mg/dL      BUN 33 (H) mg/dL      Creatinine 0.73 (L) mg/dL      Sodium 142 mmol/L      Potassium 4.2 mmol/L      Chloride 101 mmol/L      CO2 26.9 mmol/L      Calcium 9.2 mg/dL      Total Protein 6.6 g/dL      Albumin 3.50 g/dL      ALT (SGPT) 96 (H) U/L      AST (SGOT) 52 (H) U/L      Alkaline Phosphatase 77 U/L      Total Bilirubin 0.2 mg/dL      eGFR Non African Amer 104 mL/min/1.73      Globulin 3.1 gm/dL      A/G Ratio 1.1 g/dL      BUN/Creatinine Ratio 45.2 (H)     Anion Gap 14.1 mmol/L     Narrative:       The MDRD GFR formula is only valid for adults with stable renal function between ages 18 and 70.    Magnesium [[id number]]  (Normal) Collected:  10/12/18 2041    Specimen:  Blood Updated:  10/12/18 2117     Magnesium 2.4 mg/dL     POC Glucose Once [[id number]]  (Abnormal) Collected:  10/12/18 2051    Specimen:  Blood Updated:  10/12/18 2053     Glucose 186 (H) mg/dL     Narrative:       Meter: [id number] : [id number] Renetta Phillips DEEPTHI    POC Glucose Once [[id number]]  (Normal) Collected:  10/12/18 1634    Specimen:  Blood Updated:  10/12/18 1637     Glucose 117 mg/dL     Narrative:       Meter: [id number] : [id number] Chelsea LACEY    AFB Culture - Lavage, Lung, R [[id number]] Collected:  10/11/18 1222    Specimen:  Lavage from Lung, R Updated:  10/12/18 1400     AFB Stain No acid fast bacilli seen on concentrated smear    Blood Gas, Arterial [[id number]]   (Abnormal) Collected:  10/12/18 1239    Specimen:  Arterial Blood Updated:  10/12/18 1244     Site Arterial: right radial     Richi's Test Positive     pH, Arterial 7.404 pH units      pCO2, Arterial 41.3 mm Hg      pO2, Arterial 75.6 (L) mm Hg      HCO3, Arterial 25.8 mmol/L      Base Excess, Arterial 0.9 mmol/L      O2 Saturation Calculated 95.0 %      A-a Gradiant 0.4 mmHg      Barometric Pressure for Blood Gas 753.8 mmHg      Modality Adult Vent     FIO2 30 %      Ventilator Mode PC/PS     Rate 27 Breaths/minute      PEEP 5     PSV 8 cmH2O     Narrative:       PS 8 5 30% Meter: [id number] : [id number] Roscoe Aisha    POC Glucose Once [[id number]]  (Abnormal) Collected:  10/12/18 0532    Specimen:  Blood Updated:  10/12/18 0533     Glucose 150 (H) mg/dL     Narrative:       Meter: [id number] : [id number] I Love QC DEEPTHI    POC Glucose Once [[id number]]  (Abnormal) Collected:  10/11/18 2343    Specimen:  Blood Updated:  10/11/18 2347     Glucose 145 (H) mg/dL     Narrative:       Meter: [id number] : [id number] I Love QC DEEPTHI    POC Glucose Once [[id number]]  (Abnormal) Collected:  10/11/18 2009    Specimen:  Blood Updated:  10/11/18 2012     Glucose 162 (H) mg/dL     Narrative:       Meter: [id number] : [id number] I Love QC DEEPTHI    Comprehensive Metabolic Panel [[id number]]  (Abnormal) Collected:  10/11/18 1815    Specimen:  Blood Updated:  10/11/18 1927     Glucose 172 (H) mg/dL      BUN 30 (H) mg/dL      Creatinine 0.82 mg/dL      Sodium 139 mmol/L      Potassium 4.7 mmol/L      Chloride 105 mmol/L      CO2 24.6 mmol/L      Calcium 8.3 (L) mg/dL      Total Protein 6.0 g/dL      Albumin 2.80 (L) g/dL      ALT (SGPT) 123 (H) U/L      AST (SGOT) 123 (H) U/L      Alkaline Phosphatase 71 U/L      Total Bilirubin 0.2 mg/dL      eGFR Non African Amer 91 mL/min/1.73      Globulin 3.2 gm/dL      A/G Ratio 0.9 g/dL      BUN/Creatinine Ratio 36.6 (H)     Anion Gap 9.4 mmol/L     Narrative:        The MDRD GFR formula is only valid for adults with stable renal function between ages 18 and 70.    Magnesium [[id number]]  (Normal) Collected:  10/11/18 1815    Specimen:  Blood Updated:  10/11/18 1902     Magnesium 2.3 mg/dL     Phosphorus [[id number]]  (Normal) Collected:  10/11/18 1815    Specimen:  Blood Updated:  10/11/18 1902     Phosphorus 3.0 mg/dL     CBC & Differential [[id number]] Collected:  10/11/18 1815    Specimen:  Blood Updated:  10/11/18 1847    Narrative:       The following orders were created for panel order CBC & Differential.  Procedure                               Abnormality         Status                     ---------                               -----------         ------                     CBC Auto Differential[[id number]]        Abnormal            Final result                 Please view results for these tests on the individual orders.    CBC Auto Differential [[id number]]  (Abnormal) Collected:  10/11/18 1815    Specimen:  Blood Updated:  10/11/18 1847     WBC 14.76 (H) 10*3/mm3      RBC 4.64 10*6/mm3      Hemoglobin 13.5 (L) g/dL      Hematocrit 41.5 %      MCV 89.4 fL      MCH 29.1 pg      MCHC 32.5 (L) g/dL      RDW 15.5 (H) %      RDW-SD 50.3 fl      MPV 10.9 fL      Platelets 270 10*3/mm3      Neutrophil % 86.3 (H) %      Lymphocyte % 8.9 (L) %      Monocyte % 4.7 (L) %      Eosinophil % 0.0 (L) %      Basophil % 0.1 %      Immature Grans % 0.3 %      Neutrophils, Absolute 12.74 (H) 10*3/mm3      Lymphocytes, Absolute 1.32 10*3/mm3      Monocytes, Absolute 0.69 10*3/mm3      Eosinophils, Absolute 0.00 10*3/mm3      Basophils, Absolute 0.01 10*3/mm3      Immature Grans, Absolute 0.05 (H) 10*3/mm3     POC Glucose Once [[id number]]  (Abnormal) Collected:  10/11/18 1608    Specimen:  Blood Updated:  10/11/18 1609     Glucose 222 (H) mg/dL     Narrative:       Meter: [id number] : [id number] Mal She'Kerry NA    Lactic Acid, Reflex [[id number]]  (Normal) Collected:  10/11/18  1508    Specimen:  Blood Updated:  10/11/18 1533     Lactate 1.7 mmol/L     Lactic Acid, Reflex Timer (This will reflex a repeat order 3-3:15 hours after ordered.) [[id number]] Collected:  10/11/18 1057    Specimen:  Blood Updated:  10/11/18 1431     Extra Tube Hold for add-ons.     Comment: Auto resulted.       Body Fluid Cell Count With Differential - Lavage, Lung, R [[id number]] Collected:  10/11/18 1222    Specimen:  Lavage from Lung, R Updated:  10/11/18 1402    Narrative:       The following orders were created for panel order Body Fluid Cell Count With Differential - Lavage, Lung, R.  Procedure                               Abnormality         Status                     ---------                               -----------         ------                     Body fluid cell count - ...[[id number]]  Abnormal            Final result               Body fluid differential ...[[id number]]                      Final result                 Please view results for these tests on the individual orders.    Body fluid differential - Body Fluid, [[id number]] Collected:  10/11/18 1222    Specimen:  Lavage Updated:  10/11/18 1402     Neutrophils, Fluid 69 %      Lymphocytes, Fluid 22 %      Mononuclear, Fluid 9 %     Body fluid cell count - Body Fluid, [[id number]]  (Abnormal) Collected:  10/11/18 1222    Specimen:  Lavage Updated:  10/11/18 1400     Color, Fluid White     Appearance, Fluid Cloudy (A)     WBC, Fluid 1,280 /mm3      Comment: Estimated count due to clots present in specimen.        RBC, Fluid 26 /mm3      Comment: Estimated count due to clots present in specimen.       Blood Gas, Arterial [[id number]]  (Abnormal) Collected:  10/11/18 1138    Specimen:  Arterial Blood Updated:  10/11/18 1142     Site Arterial: right radial     Richi's Test Positive     pH, Arterial 7.211 (C) pH units      Comment: Critical:Notify Dr holden (11-Oct-18 11:40:53)Read back ok        pCO2, Arterial 55.7 (C) mm Hg      Comment:  Critical:Notify Dr holden (11-Oct-18 11:40:53)Read back ok        pO2, Arterial 421.8 (H) mm Hg      HCO3, Arterial 22.3 mmol/L      Base Excess, Arterial -6.2 (L) mmol/L      O2 Saturation Calculated 99.9 (H) %      A-a Gradiant 0.6 mmHg      Barometric Pressure for Blood Gas 751.6 mmHg      Modality Adult Vent     FIO2 100 %      Ventilator Mode AC     Set Mech Resp Rate 16     Rate 18 Breaths/minute      PEEP 5    Narrative:       sats 90. Ip 18 Meter: [id number] : [id number] Busby Esther    Comprehensive Metabolic Panel [[id number]]  (Abnormal) Collected:  10/11/18 1057    Specimen:  Blood Updated:  10/11/18 1139     Glucose 229 (H) mg/dL      BUN 24 (H) mg/dL      Creatinine 0.72 (L) mg/dL      Sodium 140 mmol/L      Potassium 4.4 mmol/L      Chloride 106 mmol/L      CO2 24.0 mmol/L      Calcium 7.5 (L) mg/dL      Total Protein 5.5 (L) g/dL      Albumin 2.90 (L) g/dL      ALT (SGPT) 62 (H) U/L      AST (SGOT) 80 (H) U/L      Alkaline Phosphatase 60 U/L      Total Bilirubin 0.2 mg/dL      eGFR Non African Amer 106 mL/min/1.73      Globulin 2.6 gm/dL      A/G Ratio 1.1 g/dL      BUN/Creatinine Ratio 33.3 (H)     Anion Gap 10.0 mmol/L     Narrative:       The MDRD GFR formula is only valid for adults with stable renal function between ages 18 and 70.    Troponin [[id number]]  (Abnormal) Collected:  10/11/18 1057    Specimen:  Blood Updated:  10/11/18 1138     Troponin T 0.193 (C) ng/mL     Narrative:       Troponin T Reference Ranges:  Less than 0.03 ng/mL:    Negative for AMI  0.03 to 0.09 ng/mL:      Indeterminant for AMI  Greater than 0.09 ng/mL: Positive for AMI    Magnesium [[id number]]  (Normal) Collected:  10/11/18 1057    Specimen:  Blood Updated:  10/11/18 1134     Magnesium 2.0 mg/dL     aPTT [[id number]]  (Normal) Collected:  10/11/18 1057    Specimen:  Blood Updated:  10/11/18 1133     PTT 28.2 seconds     Protime-INR [[id number]]  (Abnormal) Collected:  10/11/18 1057    Specimen:  Blood Updated:   10/11/18 1133     Protime 14.2 Seconds      INR 1.12 (H)    Lactic Acid, Plasma [[id number]]  (Abnormal) Collected:  10/11/18 1057    Specimen:  Blood Updated:  10/11/18 1131     Lactate 4.2 (C) mmol/L     CBC & Differential [[id number]] Collected:  10/11/18 1057    Specimen:  Blood Updated:  10/11/18 1113    Narrative:       The following orders were created for panel order CBC & Differential.  Procedure                               Abnormality         Status                     ---------                               -----------         ------                     CBC Auto Differential[[id number]]        Abnormal            Final result                 Please view results for these tests on the individual orders.    CBC Auto Differential [[id number]]  (Abnormal) Collected:  10/11/18 1057    Specimen:  Blood Updated:  10/11/18 1113     WBC 14.11 (H) 10*3/mm3      RBC 4.36 (L) 10*6/mm3      Hemoglobin 12.6 (L) g/dL      Hematocrit 39.1 (L) %      MCV 89.7 fL      MCH 28.9 pg      MCHC 32.2 (L) g/dL      RDW 15.1 (H) %      RDW-SD 49.9 fl      MPV 10.5 fL      Platelets 267 10*3/mm3      Neutrophil % 91.3 (H) %      Lymphocyte % 6.8 (L) %      Monocyte % 1.8 (L) %      Eosinophil % 0.0 (L) %      Basophil % 0.1 %      Immature Grans % 0.3 %      Neutrophils, Absolute 12.89 (H) 10*3/mm3      Lymphocytes, Absolute 0.96 10*3/mm3      Monocytes, Absolute 0.25 10*3/mm3      Eosinophils, Absolute 0.00 10*3/mm3      Basophils, Absolute 0.01 10*3/mm3      Immature Grans, Absolute 0.04 (H) 10*3/mm3     Respiratory Culture - Sputum, Cough [[id number]] Collected:  10/08/18 1874    Specimen:  Sputum from Cough Updated:  10/11/18 0731     Respiratory Culture Light growth (2+) Normal Respiratory Bettie     Gram Stain Result Few (2+) Mixed bacterial morphotypes seen on Gram Stain    Blood Gas, Arterial [[id number]]  (Abnormal) Collected:  10/10/18 1125    Specimen:  Arterial Blood Updated:  10/10/18 1132     Site Arterial: right radial     " Richi's Test Positive     pH, Arterial 7.414 pH units      pCO2, Arterial 41.1 mm Hg      pO2, Arterial 78.2 (L) mm Hg      HCO3, Arterial 26.3 mmol/L      Base Excess, Arterial 1.5 mmol/L      O2 Saturation Calculated 95.6 %      Barometric Pressure for Blood Gas 748.3 mmHg      Modality Cannula     Flow Rate 3 lpm      Set Mech Resp Rate 32    Narrative:       SPO2 93% Meter: [id number] : [id number] Rebeccamargarita Perdomo    Blood Gas, Arterial [[id number]] Collected:  10/09/18 2356    Specimen:  Arterial Blood Updated:  10/10/18 0004     Site Arterial: right radial     Richi's Test Positive     pH, Arterial 7.425 pH units      pCO2, Arterial 39.6 mm Hg      pO2, Arterial 80.3 mm Hg      HCO3, Arterial 25.9 mmol/L      Base Excess, Arterial 1.5 mmol/L      O2 Saturation Calculated 96.0 %      Barometric Pressure for Blood Gas 749.2 mmHg      Modality Cannula     Set Mech Resp Rate 28     Rate 28 Breaths/minute     Narrative:       96% Meter: [id number] : [id number] Giovanni Valentin        /70 (BP Location: Left arm, Patient Position: Lying)   Pulse 103   Temp 97.3 °F (36.3 °C) (Oral)   Resp 14   Ht 180.3 cm (71\")   Wt 50.9 kg (112 lb 3.4 oz)   SpO2 (!) 85%   BMI 15.65 kg/m²     Discharge Exam:  General Appearance:    Alert, cooperative, no distress                          Head:    Normocephalic, without obvious abnormality, atraumatic                          Eyes:                            Throat:   Lips, tongue, gums normal                          Neck:   Supple, symmetrical, trachea midline, no JVD                        Lungs:     Clear to auscultation bilaterally, respirations unlabored                Chest Wall:    No tenderness or deformity                        Heart:    Regular rate and rhythm, S1 and S2 normal, no murmur,no  Rub or gallop                  Abdomen:     Soft, non-tender, bowel sounds active, no masses, no organomegaly                  Extremities:   Extremities normal, " atraumatic, no cyanosis or edema                             Skin:   Skin is warm and dry,  no rashes or palpable lesions                  Neurologic:   no focal deficits noted     Disposition:  Hospice scattered bed    Patient Instructions:      Discharge Medications      ASK your doctor about these medications      Instructions Start Date   acetaminophen 500 MG tablet  Commonly known as:  TYLENOL   500 mg, Oral, Every 6 Hours PRN      diphenhydrAMINE 25 mg capsule  Commonly known as:  BENADRYL   25 mg, Oral, Every Morning           No future appointments.  Follow-up Information     Jaime Chung MD .    Specialty:  Family Medicine  Contact information:  [address] [facility] [id number] [phone]             Gerardo Crocker MD .    Specialty:  Dermatology  Contact information:  [address] [facility] [id number] [phone]                 Discharge Order     Start     Ordered    10/16/18 0954  Discharge readmit patient  Once     Expected Discharge Date:  10/16/18    Discharge Disposition:  Hospice/Medical Facility ([facility] [place] [facility])    Physician of Record for Attribution - Please select from Treatment Team:  HAROLDO MTZ [[id number]]    Review needed by CMO to determine Physician of Record:  No       Question Answer Comment   Physician of Record for Attribution - Please select from Treatment Team HAROLDO MTZ    Review needed by CMO to determine Physician of Record No        10/16/18 0954          Total time spent discharging patient including evaluation,post hospitalization follow up,  medication and post hospitalization instructions and education total time exceeds 30 minutes.    Signed:  Haroldo Mtz MD  10/16/2018  9:54 AM

## 2018-10-16 NOTE — CONSULTS
Arrived on unit, reviewed records and faxed to HMR. Spoke with ANALY Mccauley to discuss patient's disease progression. Met with patient's family to discuss patient's disease progression and goals of care. Provided detailed EOS. Family agreeable to [facility] scattered bed admission. Obtained consents via spouse-Cadden. Spoke with HMD who stated patient is HSB appropriate. Attempted to call Dr. Mtz to see if he would admit patient to [facility] scattered bed today. Unable to reach physician as of 2015. Consents dated 10/16/18. Patient placed on my schedule to complete [facility] admission tomorrow. Updated night shift Azucena BECKFORD.     Thanks for the referral and opportunity to care for this patient.    Devin Moore RN, BSN  Referral and admission coordinator  [phone]

## 2018-10-16 NOTE — NURSING NOTE
Arrived on unit, reviewed records and spoke with ANALY Latif to discuss how patient progressed over night. Assessed patient at bedside. Spoke with D who stated patient is appropriate for [facility] scattered bed. Spoke with ANALY Haley with [facility] who stated Dr. Mtz would admit patient to [facility] scattered bed today. Updated ANALY Latif and Laila CASH. [facility] team will follow up tomorrow.    Thanks for the referral and opportunity to care for this patient.    Devin Moore RN, BSN  Referral and admission coordinator  [phone]

## 2018-10-16 NOTE — PLAN OF CARE
Problem: Patient Care Overview  Goal: Plan of Care Review  Outcome: Ongoing (interventions implemented as appropriate)   10/16/18 0551   Coping/Psychosocial   Plan of Care Reviewed With patient   OTHER   Outcome Summary PRN meds admin for sx of resp distress, pain and restlessness, sx improve/resolve after meds. Pt appears to have rested comfortably with med intervention. Continue to monitor and tx per POC and MD orders.    Plan of Care Review   Progress declining       Problem: Fall Risk (Adult)  Goal: Absence of Fall  Outcome: Ongoing (interventions implemented as appropriate)      Problem: Skin Injury Risk (Adult)  Goal: Skin Health and Integrity  Outcome: Ongoing (interventions implemented as appropriate)      Problem: Palliative Care (Adult)  Goal: Maximized Comfort  Outcome: Ongoing (interventions implemented as appropriate)    Goal: Enhanced Quality of Life  Outcome: Ongoing (interventions implemented as appropriate)

## 2018-10-16 NOTE — PLAN OF CARE
Problem: Patient Care Overview  Goal: Plan of Care Review  Outcome: Ongoing (interventions implemented as appropriate)   10/16/18 1929   Coping/Psychosocial   Plan of Care Reviewed With patient;family   Plan of Care Review   Progress declining   OTHER   Outcome Summary Patient awake at times today, able to answer simple questions. Medicated PRN for pain and symptom management. Despite turning the patient frequently, he re-positions himself back on his back. Accumax in place. Bed alarm in place. Continue current plan of care.       Problem: Fall Risk (Adult)  Goal: Absence of Fall  Outcome: Ongoing (interventions implemented as appropriate)      Problem: Palliative Care (Adult)  Goal: Maximized Comfort  Outcome: Ongoing (interventions implemented as appropriate)    Goal: Enhanced Quality of Life  Outcome: Ongoing (interventions implemented as appropriate)      Problem: Skin Injury Risk (Adult)  Goal: Skin Health and Integrity  Outcome: Ongoing (interventions implemented as appropriate)

## 2018-10-17 NOTE — H&P
HISTORY AND PHYSICAL   [facility]        Patient Identification:  Name: Parth Woody  Age: 78 y.o.  Sex: male  :  1940  MRN: [id number]                     Primary Care Physician: Jaime Chung MD    Chief Complaint:  hospice    History of Present Illness:        Parth Woody  is a 78-year-old now with history of COPD and tobacco abuse who takes no medications at home and does not go to the doctor.  He presented to the emergency room with a 3 month history of shortness of breath that he states has become dramatically worse over the past 2 days.  He has developed an associated productive cough of yellowish sputum.  He had a low-grade fever upon presentation.  He states that his shortness of breath is constant.  He denies any chest pain or pressure.  He denies any nausea or vomiting.  He smokes 1 pack per day.  Workup in the emergency room revealed a left lower lobe pneumonia.    His only other complaint was a brief episode of loose stools this morning which has resolved.  He denies abdominal pain.       The patient was admitted to hospital and was in critical care unit.  Patient had coded and was on A ventilator and doing poorly.  After being in the hospital for several days family agreed to palliative care the patient was transferred to palliative care unit for comfort measures.  Echocardiogram showed ejection fraction of 10% patient was felt to have ischemic cardiomyopathy.  The patient had ventricular fibrillation and was resuscitated . Hospice was consulted and he was felt appropriate to be switched to hospice scattered bed for continued comfort care.         Past Medical History:  Past Medical History:   Diagnosis Date   • COPD (chronic obstructive pulmonary disease) (CMS/[facility])      Past Surgical History:  Past Surgical History:   Procedure Laterality Date   • BRONCHOSCOPY N/A 10/11/2018    Procedure: BRONCHOSCOPY AT BEDSIDE with BAL;  Surgeon: Vinny Campos MD;  Location: [facility]  ENDOSCOPY;  Service: Pulmonary   • CERVICAL SPINE SURGERY     • PROSTATECTOMY        Home Meds:  Prescriptions Prior to Admission   Medication Sig Dispense Refill Last Dose   • acetaminophen (TYLENOL) 500 MG tablet Take 500 mg by mouth Every 6 (Six) Hours As Needed for Mild Pain .      • diphenhydrAMINE (BENADRYL) 25 mg capsule Take 25 mg by mouth Every Morning.        Current meds    Current Facility-Administered Medications:   •  acetaminophen (TYLENOL) tablet 650 mg, 650 mg, Oral, Q4H PRN **OR** acetaminophen (TYLENOL) 160 MG/5ML solution 650 mg, 650 mg, Oral, Q4H PRN **OR** acetaminophen (TYLENOL) suppository 650 mg, 650 mg, Rectal, Q4H PRN, Aashish Mtz MD  •  diphenoxylate-atropine (LOMOTIL) 2.5-0.025 MG per tablet 1 tablet, 1 tablet, Oral, Q2H PRN, Aashish Mtz MD  •  Glycerin-Hypromellose- (ARTIFICIAL TEARS) 0.2-0.2-1 % ophthalmic solution solution 1 drop, 1 drop, Both Eyes, Q30 Min PRN, Aashish Mtz MD  •  glycopyrrolate (ROBINUL) injection 0.4 mg, 0.4 mg, Intravenous, Q2H PRN, Aashish Mtz MD, 0.4 mg at 10/17/18 0954  •  HYDROmorphone (DILAUDID) injection 1 mg, 1 mg, Intravenous, Q1H PRN **OR** morphine injection 4 mg, 4 mg, Intravenous, Q1H PRN, 4 mg at 10/17/18 0954 **OR** morphine concentrated solution solution 10 mg, 10 mg, Sublingual, Q1H PRN, Aashish Mtz MD  •  LORazepam (ATIVAN) injection 0.5 mg, 0.5 mg, Intravenous, Q1H PRN **OR** LORazepam (ATIVAN) 2 MG/ML concentrated solution 0.5 mg, 0.5 mg, Sublingual, Q1H PRN, Aashish Mtz MD  •  LORazepam (ATIVAN) injection 1 mg, 1 mg, Intravenous, Q1H PRN, 1 mg at 10/17/18 0954 **OR** LORazepam (ATIVAN) 2 MG/ML concentrated solution 1 mg, 1 mg, Sublingual, Q1H PRN, Aashish Mtz MD  •  LORazepam (ATIVAN) injection 2 mg, 2 mg, Intravenous, Q1H PRN, 2 mg at 10/16/18 0642 **OR** LORazepam (ATIVAN) 2 MG/ML concentrated solution 2 mg, 2 mg, Sublingual, Q1H PRN, Aashish Mtz MD  •  Morphine injection 6 mg, 6 mg, Intravenous, Q1H PRN, Beard,  Aashish LAM MD  •  [START ON 10/18/2018] Scopolamine (TRANSDERM-SCOP) 1.5 MG/3DAYS patch 1 patch, 1 patch, Transdermal, Q72H, Mtz, Aashish LAM MD  Allergies:  No Known Allergies  Immunizations:  Immunization History   Administered Date(s) Administered   • Tdap 2016     Social History:   Social History     Social History Narrative   • No narrative on file     Social History     Social History   • Marital status:      Spouse name: N/A   • Number of children: N/A   • Years of education: N/A     Occupational History   • Not on file.     Social History Main Topics   • Smoking status: Current Every Day Smoker     Packs/day: 1.00     Types: Cigarettes   • Smokeless tobacco: Not on file   • Alcohol use Yes      Comment: regular   • Drug use: No   • Sexual activity: Not on file     Other Topics Concern   • Not on file     Social History Narrative   • No narrative on file       Family History:  No family history on file.     Review of Systems  See history of present illness and past medical history.  Patient is unable to answer questions due to confusion.  Remainder of ROS is negative.    Objective:  tMax 24 hrs: Temp (24hrs), Av.7 °F (36.5 °C), Min:97.7 °F (36.5 °C), Max:97.7 °F (36.5 °C)    Vitals Ranges:   Temp:  [97.7 °F (36.5 °C)] 97.7 °F (36.5 °C)  Heart Rate:  [71-98] 98  Resp:  [14-20] 20  BP: (122-135)/(64-69) 122/64      Exam:  /64 (BP Location: Left arm, Patient Position: Lying)   Pulse 98   Temp 97.7 °F (36.5 °C) (Axillary)   Resp 20   SpO2 (!) 88%     General Appearance:    He is confused, no distress, appears stated age   Head:    Normocephalic, without obvious abnormality, atraumatic   Eyes:    PERRL, conjunctiva/corneas clear, EOM's intact, both eyes   Ears:    Normal external ear canals, both ears   Nose:   Nares normal, septum midline, mucosa normal, no drainage    or sinus tenderness   Throat:   Lips, mucosa, and tongue normal   Neck:   Supple, symmetrical, trachea midline, no  adenopathy;     thyroid:  no enlargement/tenderness/nodules; no carotid    bruit or JVD   Back:     Symmetric, no curvature, ROM normal, no CVA tenderness   Lungs:     Clear to auscultation bilaterally, respirations unlabored   Chest Wall:    No tenderness or deformity    Heart:    Regular rate and rhythm, S1 and S2 normal, no murmur, rub   or gallop   Abdomen:     Soft, non-tender, bowel sounds active all four quadrants,     no masses, no hepatomegaly, no splenomegaly   Extremities:   Extremities normal, atraumatic, no cyanosis or edema   Pulses:   2+ and symmetric all extremities   Skin:   Skin color, texture, turgor normal, no rashes or lesions   Lymph nodes:   Cervical, supraclavicular, and axillary nodes normal   Neurologic:   CNII-XII intact, normal strength, sensation intact throughout, He is confused      .    Data Review:  Lab Results (last 72 hours)     ** No results found for the last 72 hours. **                   Imaging Results (all)     None        Patient Active Problem List   Diagnosis Code   • COPD exacerbation (CMS/HCC) J44.1   • Pneumonia J18.9   • Tobacco abuse Z72.0   • Acute hypoxemic respiratory failure (CMS/HCC) J96.01   • Severe malnutrition due to chronic illness E43   • Sepsis (CMS/HCC) A41.9   • Atelectasis of right lung J98.11   • Cardiac arrest (CMS/HCC) I46.9   • Ventricular fibrillation (CMS/HCC) I49.01   • Cardiomyopathy (CMS/HCC) I42.9   • Admission for hospice care Z51.5       Assessment:  Active Hospital Problems    Diagnosis Date Noted   • **Admission for hospice care [Z51.5] 10/16/2018   • Cardiac arrest (CMS/HCC) [I46.9] 10/13/2018   • Ventricular fibrillation (CMS/HCC) [I49.01] 10/13/2018   • Cardiomyopathy (CMS/HCC) [I42.9] 10/13/2018   • Tobacco abuse [Z72.0] 10/08/2018   • Acute hypoxemic respiratory failure (CMS/HCC) [J96.01] 10/08/2018      Resolved Hospital Problems    Diagnosis Date Noted Date Resolved   No resolved problems to display.       Plan:  The patient's  admitted to hospice scattered bed for comfort measures only.    Aashish Mtz MD  10/17/2018  10:46 AM

## 2018-10-17 NOTE — PLAN OF CARE
"Problem: Patient Care Overview  Goal: Plan of Care Review  Outcome: Ongoing (interventions implemented as appropriate)   10/17/18 0626   Coping/Psychosocial   Plan of Care Reviewed With patient   Plan of Care Review   Progress no change   OTHER   Outcome Summary PRN meds admin for sx management and to maintain pt comfort, sx of discomfort, dyspnea, restlessness improved after med interventions. Pt requested O2 be \"turned up\" this am, O2 increased from 2L to 4L per pt request. Pt able to voice simple needs and answer simple questions. Continue to monitor and tx per POC and MD orders.        Problem: Fall Risk (Adult)  Goal: Absence of Fall  Outcome: Ongoing (interventions implemented as appropriate)      Problem: Palliative Care (Adult)  Goal: Maximized Comfort  Outcome: Ongoing (interventions implemented as appropriate)    Goal: Enhanced Quality of Life  Outcome: Ongoing (interventions implemented as appropriate)      Problem: Skin Injury Risk (Adult)  Goal: Skin Health and Integrity  Outcome: Ongoing (interventions implemented as appropriate)        "

## 2018-10-17 NOTE — PROGRESS NOTES
[facility] Visit Report    Parth Woody  [id number]  10/17/2018    Admission R/T [facility] Dx: YES      Reason for [facility] Admission: Cardiomyopathy, MI, VFIB, Resp.failure      Symptom  Management: Restlessness, pain and soa      Nursing/Medication Recommendations: No recommendations at this time      Psychosocial Issues and Recommendations: Provide support to patient and family      Spiritual Concerns and Recommendations: None at present      [facility]  None, continue to monitor for rapid decline. Patient receiving IV medications for symptom management of restlessness, pain and soa and is meeting criteria for GIP as a [facility] scattered bed patient.      Review of Visit: Close monitoring for safety/falls, symptom management of restlessness, pain and soa, comfort care for rapidly declining patient. Patient awake abed, tries to verbalize but confused, color slightly ashen, soa when trying to move around or talk. Spoke to spouse at bedside regarding rapidly declining status and she is aware and accepting, a little surprised but understands that his heart EF is only 10%. Emotional support provided and encouraged her to contact [facility] 24/7 for any further questions or concerns. Discussed care needs with staff RN and patient has received IV Morphine 4mg x 3 doses, IV Ativan 1mg x 3 doses and IV Robinul 0.4mg x 3 doses since midnight, also has a Scopolamine patch in place. 02 sat 88% on 4L. Will continue to see daily to assess needs, monitor status and offer support.        Poonam Ware RN  [facility] Visit Nurse

## 2018-10-17 NOTE — PLAN OF CARE
Problem: Palliative Care (Adult)  Goal: Maximized Comfort  Outcome: Ongoing (interventions implemented as appropriate)    Goal: Enhanced Quality of Life  Outcome: Ongoing (interventions implemented as appropriate)      Problem: Skin Injury Risk (Adult)  Goal: Skin Health and Integrity  Outcome: Ongoing (interventions implemented as appropriate)      Problem: Patient Care Overview  Goal: Plan of Care Review  Outcome: Ongoing (interventions implemented as appropriate)   10/17/18 4405   Coping/Psychosocial   Plan of Care Reviewed With spouse;patient   Plan of Care Review   Progress declining   OTHER   Outcome Summary Pt given pain medications and anxiety medications prn q 2 hr this am. At 1400 increased pt to dilaudid 1 mg and ativan 2mg. Pt has rested comfortablly throughout the day with these doses. Premedicated with increased dose x2 pre turns. Pt was alos given x1 prn dose for secretions at beginning of shift. Wife was at bedside earliler in the day and then left. Bed alarm education given to wife, she vu.Will continue with comfort care measures and monitoring.

## 2018-10-18 PROBLEM — J44.9 COPD (CHRONIC OBSTRUCTIVE PULMONARY DISEASE) (HCC): Status: ACTIVE | Noted: 2018-01-01

## 2018-10-18 NOTE — NURSING NOTE
Provided visit to pt and MANNY Felder at bedside. Pt is awake and partially alert at the time of assessment. He was observed attempting to reposition himself in bed and Emerita reports she has been trying to assist him into a comfortable position but has not been successful thus far. He has mild mottling to bilateral hands and feet and they are cool to touch. Terminal congestion is audible at this time but mild. Collaborated with ANALY Franco to suggest pt receive medications for his restlessness. She VU and plans to adminster promptly. His respirations are slightly labored but even @ 14/min. His 02 sat is 83% on 4 Lpm oxygen via nasal cannula. Temp is 97.3, pulse 68, and b/p= 99/49. He has recieved IV robinul x1 dose since midnight and a scopolamine patch is in place. He has recieved IV dilaudid 1mg and ativan 2mg IV x 4 doses each since midnight. Emerita denies any further questions or needs at this time. Will continue to provide daily visit to assess pt adn provide support to pt/family and staff as needed.

## 2018-10-18 NOTE — PLAN OF CARE
Problem: Fall Risk (Adult)  Goal: Absence of Fall  Outcome: Ongoing (interventions implemented as appropriate)      Problem: Palliative Care (Adult)  Goal: Maximized Comfort  Outcome: Ongoing (interventions implemented as appropriate)    Goal: Enhanced Quality of Life  Outcome: Ongoing (interventions implemented as appropriate)      Problem: Skin Injury Risk (Adult)  Goal: Skin Health and Integrity  Outcome: Ongoing (interventions implemented as appropriate)      Problem: Patient Care Overview  Goal: Plan of Care Review  Outcome: Ongoing (interventions implemented as appropriate)   10/18/18 0454   Coping/Psychosocial   Plan of Care Reviewed With patient;spouse   Plan of Care Review   Progress declining   OTHER   Outcome Summary Pt appeared uncomfortable and restless at times, medication given with good result. Pt appears comfortable at this time. Premedicating for turns to prevent any discomfort. Will continue to monitor per comfort care.      Goal: Individualization and Mutuality  Outcome: Ongoing (interventions implemented as appropriate)    Goal: Discharge Needs Assessment  Outcome: Ongoing (interventions implemented as appropriate)    Goal: Interprofessional Rounds/Family Conf  Outcome: Ongoing (interventions implemented as appropriate)

## 2018-10-18 NOTE — PLAN OF CARE
Problem: Fall Risk (Adult)  Goal: Absence of Fall  Outcome: Ongoing (interventions implemented as appropriate)      Problem: Palliative Care (Adult)  Goal: Maximized Comfort  Outcome: Ongoing (interventions implemented as appropriate)    Goal: Enhanced Quality of Life  Outcome: Ongoing (interventions implemented as appropriate)      Problem: Patient Care Overview  Goal: Plan of Care Review  Outcome: Ongoing (interventions implemented as appropriate)   10/18/18 0962   Coping/Psychosocial   Plan of Care Reviewed With patient   Plan of Care Review   Progress declining   OTHER   Outcome Summary Pt premedicated for turns today. Pt was restless and SOA this afternoon. Pt was given second dose of stivan and dilaudid to help him with terminal restlessness. Pt appears more comfortable at this time. Will continue comfort measures and to monitor.

## 2018-10-18 NOTE — PROGRESS NOTES
Name: Parth Woody ADMIT: 10/16/2018   : 1940  PCP: Jaime Chung MD    MRN: [id number] LOS: 2 days   AGE/SEX: 78 y.o. male  ROOM: [facility]     CC- AMS  Subjective     Seen earlier today  Receiving comfort medications  Minimally responsive    ROS  Unable to obtain due to pt acuity    Objective   Vital Signs  Temp:  [97.3 °F (36.3 °C)] 97.3 °F (36.3 °C)  Heart Rate:  [61-68] 68  Resp:  [8-20] 8  BP: ()/(49-56) 99/49  SpO2:  [83 %-95 %] 83 %  on  Flow (L/min):  [4] 4;   Device (Oxygen Therapy): nasal cannula  There is no height or weight on file to calculate BMI.    Objective     Minimally respnosive  Shallow breathing  Dry MM  Decreased bs at bases  Soft, nt  No edema  Muscle atrophy      Results Review:       I reviewed the patient's new clinical results.    Results from last 7 days  Lab Units 10/14/18  0520 10/13/18  0445 10/11/18  1815   WBC 10*3/mm3 11.12* 15.65* 14.76*   HEMOGLOBIN g/dL 13.7 14.0 13.5*   PLATELETS 10*3/mm3 263 252 270       Results from last 7 days  Lab Units 10/14/18  0520 10/13/18  0445 10/12/18  2041 10/11/18  1815   SODIUM mmol/L 143 143 142 139   POTASSIUM mmol/L 4.0 3.8 4.2 4.7   CHLORIDE mmol/L 101 101 101 105   CO2 mmol/L 29.1* 29.4* 26.9 24.6   BUN mg/dL 42* 34* 33* 30*   CREATININE mg/dL 0.84 0.61* 0.73* 0.82   GLUCOSE mg/dL 208* 105* 166* 172*   Estimated Creatinine Clearance: 53 mL/min (by C-G formula based on SCr of 0.84 mg/dL).    Results from last 7 days  Lab Units 10/14/18  0520 10/13/18  0445 10/12/18  2041 10/11/18  1815   CALCIUM mg/dL 8.4* 8.6 9.2 8.3*   ALBUMIN g/dL  --  3.20* 3.50 2.80*   MAGNESIUM mg/dL  --  2.3 2.4 2.3   PHOSPHORUS mg/dL  --   --   --  3.0         Scopolamine 1 patch Transdermal Q72H             Assessment/Plan   Assessment:      Active Hospital Problems    Diagnosis Date Noted   • **Admission for hospice care [Z51.5] 10/16/2018   • COPD (chronic obstructive pulmonary disease) (CMS/[facility]) [J44.9] 10/18/2018   • Cardiac arrest (CMS/[facility])  [I46.9] 10/13/2018   • Ventricular fibrillation (CMS/HCC) [I49.01] 10/13/2018   • Cardiomyopathy (CMS/HCC) [I42.9] 10/13/2018   • Severe malnutrition due to chronic illness [E43] 10/09/2018   • Tobacco abuse [Z72.0] 10/08/2018   • Acute hypoxemic respiratory failure (CMS/HCC) [J96.01] 10/08/2018      Resolved Hospital Problems    Diagnosis Date Noted Date Resolved   No resolved problems to display.       Plan:   - Continue comfort care  - IV narcotics and ativan for symptom control  - YU Taylor    Reviewed previous records          Brett Mcdonald MD  [facility]  10/18/18  2:58 PM

## 2018-10-19 NOTE — PLAN OF CARE
Problem: Fall Risk (Adult)  Goal: Absence of Fall  Outcome: Ongoing (interventions implemented as appropriate)      Problem: Palliative Care (Adult)  Goal: Maximized Comfort  Outcome: Ongoing (interventions implemented as appropriate)    Goal: Enhanced Quality of Life  Outcome: Ongoing (interventions implemented as appropriate)      Problem: Skin Injury Risk (Adult)  Goal: Skin Health and Integrity  Outcome: Ongoing (interventions implemented as appropriate)      Problem: Patient Care Overview  Goal: Plan of Care Review  Outcome: Ongoing (interventions implemented as appropriate)   10/19/18 0439   Coping/Psychosocial   Plan of Care Reviewed With patient   Plan of Care Review   Progress declining   OTHER   Outcome Summary Pt was very restless and hard to settle at the beginning of the shift. Since increasing Dilaudid dose, pt has rested comfortably. Premedicating to ensure comfort. Will continue to monitor per comfort care.      Goal: Individualization and Mutuality  Outcome: Ongoing (interventions implemented as appropriate)    Goal: Discharge Needs Assessment  Outcome: Ongoing (interventions implemented as appropriate)    Goal: Interprofessional Rounds/Family Conf  Outcome: Ongoing (interventions implemented as appropriate)      Problem: Dying Patient, Actively (Adult)  Goal: Identify Related Risk Factors and Signs and Symptoms  Outcome: Ongoing (interventions implemented as appropriate)    Goal: Comfort/Pain Control  Outcome: Ongoing (interventions implemented as appropriate)    Goal: Peace/Preservation of Dignity During the Dying Process  Outcome: Ongoing (interventions implemented as appropriate)

## 2018-10-19 NOTE — NURSING NOTE
Provided visit to pt at [facility]. Pt unresponsive and no family present at time of visit. Pt is lying in recovery position and breathing pattern is irregular but appears unlabored. He appears comfortable. He has mottling noted to bilateral hands and feet and dependent edema is present to both hands. Temp 98.0, pulse 79, resp rate 16/minute and b/p=100/52. 02 sat 90% on 4 Lpm oxygen via nasal cannula. He hs a scopolamine patch in place for management of secretions/congestion and has recieved dilaudid 1.5mg IV and ativan 2mg IV x 4 doses since midnight. Will continue to provide daily visits to assess pt and provide support to pt/family and staff.

## 2018-10-19 NOTE — PROGRESS NOTES
Name: Parth Woody ADMIT: 10/16/2018   : 1940  PCP: Jaime Chung MD    MRN: [id number] LOS: 3 days   AGE/SEX: 78 y.o. male  ROOM: [facility]     CC- AMS  Subjective     Receiving comfort medications  Non-responsive  Resting well this morning    ROS  Unable to obtain due to pt acuity    Objective   Vital Signs  Temp:  [96.2 °F (35.7 °C)-98 °F (36.7 °C)] 98 °F (36.7 °C)  Heart Rate:  [74-79] 79  Resp:  [16-24] 16  BP: (100-136)/(52-63) 100/52  SpO2:  [89 %-90 %] 89 %  on  Flow (L/min):  [4] 4;   Device (Oxygen Therapy): room air  There is no height or weight on file to calculate BMI.    Objective:  Vital signs: (most recent): Blood pressure 100/52, pulse 79, temperature 98 °F (36.7 °C), temperature source Oral, resp. rate 16, SpO2 (!) 89 %.               Non-respnosive  Shallow breathing  Dry MM  Decreased bs at bases  Soft, nt  No edema  Muscle atrophy      Results Review:       I reviewed the patient's new clinical results.    Results from last 7 days  Lab Units 10/14/18  0520 10/13/18  0445   WBC 10*3/mm3 11.12* 15.65*   HEMOGLOBIN g/dL 13.7 14.0   PLATELETS 10*3/mm3 263 252       Results from last 7 days  Lab Units 10/14/18  0520 10/13/18  0445 10/12/18  2041   SODIUM mmol/L 143 143 142   POTASSIUM mmol/L 4.0 3.8 4.2   CHLORIDE mmol/L 101 101 101   CO2 mmol/L 29.1* 29.4* 26.9   BUN mg/dL 42* 34* 33*   CREATININE mg/dL 0.84 0.61* 0.73*   GLUCOSE mg/dL 208* 105* 166*   Estimated Creatinine Clearance: 53 mL/min (by C-G formula based on SCr of 0.84 mg/dL).    Results from last 7 days  Lab Units 10/14/18  0520 10/13/18  0445 10/12/18  2041   CALCIUM mg/dL 8.4* 8.6 9.2   ALBUMIN g/dL  --  3.20* 3.50   MAGNESIUM mg/dL  --  2.3 2.4         Scopolamine 1 patch Transdermal Q72H      Diet Regular; Thin      Assessment/Plan   Assessment:      Active Hospital Problems    Diagnosis Date Noted   • **Admission for hospice care [Z51.5] 10/16/2018   • COPD (chronic obstructive pulmonary disease) (CMS/[facility]) [J44.9]  10/18/2018   • Cardiac arrest (CMS/HCC) [I46.9] 10/13/2018   • Ventricular fibrillation (CMS/HCC) [I49.01] 10/13/2018   • Cardiomyopathy (CMS/HCC) [I42.9] 10/13/2018   • Severe malnutrition due to chronic illness [E43] 10/09/2018   • Tobacco abuse [Z72.0] 10/08/2018   • Acute hypoxemic respiratory failure (CMS/HCC) [J96.01] 10/08/2018      Resolved Hospital Problems    Diagnosis Date Noted Date Resolved   No resolved problems to display.       Plan:   - Continue comfort care  - IV narcotics and ativan for symptom control  - Taylor, O2    Likely with only hours-days remaining    Reviewed previous records          Brett Mcdonald MD  [facility]  10/19/18  10:15 AM

## 2018-10-20 NOTE — DISCHARGE SUMMARY
Name: Parth Woody  Age: 78 y.o.  Sex: male  :  1940  MRN: [id number]         Primary Care Physician: Jaime Chung MD      Date of Admission:  10/16/2018  Date and Time of Death:  10/19/2018 at 7:27 PM    Principle Cause of Death:   Cardiomyopathy    Secondary Diagnoses:     Admission for hospice care    COPD exacerbation (CMS/HCC)    Pneumonia    Tobacco abuse    Acute hypoxemic respiratory failure (CMS/HCC)    Severe malnutrition due to chronic illness    Sepsis (CMS/HCC)    Cardiac arrest (CMS/HCC)    Ventricular fibrillation (CMS/HCC)    Cardiomyopathy (CMS/HCC)    COPD (chronic obstructive pulmonary disease) (CMS/[facility])        Hospital Course  Parth Woody  is a 78-year-old now with history of COPD and tobacco abuse who takes no medications at home and does not go to the doctor.  He presented to the emergency room with a 3 month history of shortness of breath that he states has become dramatically worse over the past 2 days.  He has developed an associated productive cough of yellowish sputum.  He had a low-grade fever upon presentation.  He states that his shortness of breath is constant.  He denies any chest pain or pressure.  He denies any nausea or vomiting.  He smokes 1 pack per day.  Workup in the emergency room revealed a left lower lobe pneumonia and he was started on abx.       The patient was admitted to hospital but later developed Afib with RVR, respiratory failure and was transferred to the ICU. Patient had coded and was on A ventilator and doing poorly. Echocardiogram showed ejection fraction of 10% patient was felt to have ischemic cardiomyopathy.He had recurrent ventricular fibrillation arrest.  After being in the hospital for several days family agreed to palliative care the patient was transferred to palliative care unit for comfort measures.    Hospice was consulted and he was felt appropriate to be switched to hospice scattered bed for continued comfort care. He ultimately   on 10/19/18 at 7:27PM    Greater than 35 minutes spent today including seeing the patient earlier today as well as preparation of the discharge summary.       Brett Mcdonald MD  [facility]  10/19/18  9:10 PM

## 2018-10-22 NOTE — PROGRESS NOTES
Case Management Discharge Note    Final Note: The patient  on 10/19/18 @ 19:27. STEPHANIE Crandall RN, CCP.     Destination - Selection Complete     Service Request Status Selected Specialties Address Phone Number Fax Number    [facility] [phone] [id number] [phone]      Durable Medical Equipment     No service has been selected for the patient.      Dialysis/Infusion     No service has been selected for the patient.      Home Medical Care     No service has been selected for the patient.      Social Care     No service has been selected for the patient.             Final Discharge Disposition Code: 41 -  in medical facility

## 2018-11-22 LAB
MYCOBACTERIUM SPEC CULT: NORMAL
NIGHT BLUE STAIN TISS: NORMAL

## (undated) DEVICE — LN SMPL O2 NASL/ORL SMART/CAPNOLINE PLS A/

## (undated) DEVICE — TUBING, SUCTION, 1/4" X 10', STRAIGHT: Brand: MEDLINE

## (undated) DEVICE — VITAL SIGNS™ JACKSON-REES CIRCUITS: Brand: VITAL SIGNS™

## (undated) DEVICE — MSK AIRWY LARYNG LMA UNIQUE STD PK SZ4

## (undated) DEVICE — SINGLE USE SUCTION VALVE MAJ-209: Brand: SINGLE USE SUCTION VALVE (STERILE)

## (undated) DEVICE — ADAPT SWVL FIBROPTIC BRONCH

## (undated) DEVICE — SINGLE USE BIOPSY VALVE MAJ-210: Brand: SINGLE USE BIOPSY VALVE (STERILE)